# Patient Record
Sex: FEMALE | Race: WHITE | NOT HISPANIC OR LATINO | URBAN - METROPOLITAN AREA
[De-identification: names, ages, dates, MRNs, and addresses within clinical notes are randomized per-mention and may not be internally consistent; named-entity substitution may affect disease eponyms.]

---

## 2018-06-25 ENCOUNTER — INPATIENT (INPATIENT)
Facility: HOSPITAL | Age: 73
LOS: 7 days | Discharge: HOME HEALTH | DRG: 299 | End: 2018-07-03
Attending: INTERNAL MEDICINE | Admitting: HOSPITALIST
Payer: MEDICARE

## 2018-06-25 VITALS — WEIGHT: 100.09 LBS | HEIGHT: 63 IN

## 2018-06-25 LAB
ALBUMIN SERPL ELPH-MCNC: 4.2 G/DL — SIGNIFICANT CHANGE UP (ref 3.3–5.2)
ALP SERPL-CCNC: 85 U/L — SIGNIFICANT CHANGE UP (ref 40–120)
ALT FLD-CCNC: 24 U/L — SIGNIFICANT CHANGE UP
ANION GAP SERPL CALC-SCNC: 13 MMOL/L — SIGNIFICANT CHANGE UP (ref 5–17)
APTT BLD: 36.3 SEC — SIGNIFICANT CHANGE UP (ref 27.5–37.4)
AST SERPL-CCNC: 23 U/L — SIGNIFICANT CHANGE UP
BASOPHILS # BLD AUTO: 0 K/UL — SIGNIFICANT CHANGE UP (ref 0–0.2)
BASOPHILS NFR BLD AUTO: 0.5 % — SIGNIFICANT CHANGE UP (ref 0–2)
BILIRUB SERPL-MCNC: <0.2 MG/DL — LOW (ref 0.4–2)
BLD GP AB SCN SERPL QL: SIGNIFICANT CHANGE UP
BUN SERPL-MCNC: 18 MG/DL — SIGNIFICANT CHANGE UP (ref 8–20)
CALCIUM SERPL-MCNC: 9.7 MG/DL — SIGNIFICANT CHANGE UP (ref 8.6–10.2)
CHLORIDE SERPL-SCNC: 100 MMOL/L — SIGNIFICANT CHANGE UP (ref 98–107)
CO2 SERPL-SCNC: 26 MMOL/L — SIGNIFICANT CHANGE UP (ref 22–29)
CREAT SERPL-MCNC: 0.64 MG/DL — SIGNIFICANT CHANGE UP (ref 0.5–1.3)
EOSINOPHIL # BLD AUTO: 0.3 K/UL — SIGNIFICANT CHANGE UP (ref 0–0.5)
EOSINOPHIL NFR BLD AUTO: 3.5 % — SIGNIFICANT CHANGE UP (ref 0–6)
GLUCOSE SERPL-MCNC: 110 MG/DL — SIGNIFICANT CHANGE UP (ref 70–115)
HCT VFR BLD CALC: 38.3 % — SIGNIFICANT CHANGE UP (ref 37–47)
HGB BLD-MCNC: 12.5 G/DL — SIGNIFICANT CHANGE UP (ref 12–16)
INR BLD: 0.95 RATIO — SIGNIFICANT CHANGE UP (ref 0.88–1.16)
LACTATE BLDV-MCNC: 0.8 MMOL/L — SIGNIFICANT CHANGE UP (ref 0.5–2)
LYMPHOCYTES # BLD AUTO: 2.3 K/UL — SIGNIFICANT CHANGE UP (ref 1–4.8)
LYMPHOCYTES # BLD AUTO: 24.5 % — SIGNIFICANT CHANGE UP (ref 20–55)
MCHC RBC-ENTMCNC: 29.8 PG — SIGNIFICANT CHANGE UP (ref 27–31)
MCHC RBC-ENTMCNC: 32.6 G/DL — SIGNIFICANT CHANGE UP (ref 32–36)
MCV RBC AUTO: 91.2 FL — SIGNIFICANT CHANGE UP (ref 81–99)
MONOCYTES # BLD AUTO: 0.9 K/UL — HIGH (ref 0–0.8)
MONOCYTES NFR BLD AUTO: 9.2 % — SIGNIFICANT CHANGE UP (ref 3–10)
NEUTROPHILS # BLD AUTO: 5.8 K/UL — SIGNIFICANT CHANGE UP (ref 1.8–8)
NEUTROPHILS NFR BLD AUTO: 62.2 % — SIGNIFICANT CHANGE UP (ref 37–73)
NT-PROBNP SERPL-SCNC: 305 PG/ML — HIGH (ref 0–300)
PLATELET # BLD AUTO: 372 K/UL — SIGNIFICANT CHANGE UP (ref 150–400)
POTASSIUM SERPL-MCNC: 4.2 MMOL/L — SIGNIFICANT CHANGE UP (ref 3.5–5.3)
POTASSIUM SERPL-SCNC: 4.2 MMOL/L — SIGNIFICANT CHANGE UP (ref 3.5–5.3)
PROT SERPL-MCNC: 7.6 G/DL — SIGNIFICANT CHANGE UP (ref 6.6–8.7)
PROTHROM AB SERPL-ACNC: 10.4 SEC — SIGNIFICANT CHANGE UP (ref 9.8–12.7)
RBC # BLD: 4.2 M/UL — LOW (ref 4.4–5.2)
RBC # FLD: 15.6 % — SIGNIFICANT CHANGE UP (ref 11–15.6)
SODIUM SERPL-SCNC: 139 MMOL/L — SIGNIFICANT CHANGE UP (ref 135–145)
TYPE + AB SCN PNL BLD: SIGNIFICANT CHANGE UP
WBC # BLD: 9.3 K/UL — SIGNIFICANT CHANGE UP (ref 4.8–10.8)
WBC # FLD AUTO: 9.3 K/UL — SIGNIFICANT CHANGE UP (ref 4.8–10.8)

## 2018-06-25 PROCEDURE — 99285 EMERGENCY DEPT VISIT HI MDM: CPT

## 2018-06-25 PROCEDURE — 93010 ELECTROCARDIOGRAM REPORT: CPT

## 2018-06-25 NOTE — ED ADULT NURSE NOTE - OBJECTIVE STATEMENT
Pt received in [facility] with multiple medical complaints. Pt presents with gang green to R 1st digit that her PMD states is "dry gang green" and that it will just fall off on it's own. Pt also noted to have purulent draining from gang green site. Pt also mentions having 1 week of painless "vaginal spotting" as per daughter. Pt had UTI 1 week ago and has finished her round of abx.

## 2018-06-25 NOTE — ED PROVIDER NOTE - OBJECTIVE STATEMENT
71 y/o hx of a fib, COPD, glaucoma, dry gangrene and blood clots, peripheral vascular disease presents to ED c/o necrotic toe falling off. Per daughter brought pt to ED who noticed her leg and shin bone feel hot. Per daughter vaginal bleeding. pt just came back from [place] on home hospice per daughter. Pt recently had a UTI and took antibiotics for it. Per daughter pt heavily drinks. Former smoker. Stopped tobacco use about a month ago. Dr Salamanca evaluated pt and said to go to hospital to get checked out. Pt is not on any blood thinners at this time. Pt takes aspirin and antibiotics. No further complaints at this time. 73 y/o hx of Afib, COPD, glaucoma, dry gangrene and blood clots, peripheral vascular disease presents to ED c/o 1st digit on R foot pain and falling off that began in March. Per daughter who brought pt to ED who noticed her leg and shin bone feel hot. Pt was evaluated by a podiatrist and vascular surgeon in [place] but doesn't have any doctors in [place]. Per daughter pt also has vaginal bleeding. Pt just came back from [place] on home hospice. Pt recently had a UTI and took antibiotics for it. Per daughter pt drinks a lot of fluids and urinates frequently. Former smoker. Stopped tobacco use about a month ago. She saw her PMD who told her to go to hospital to get checked out. Pt takes aspirin and antibiotics. Allergy to PCN. No further complaints at this time.  PMD: Chay

## 2018-06-25 NOTE — ED STATDOCS - PROGRESS NOTE DETAILS
73 yo F PT, PmHX: COPD. presents to ED complaining of multiple complaints. PT states she is having SOB, difficulty breathing and a wet cough x 2 weeks. PT also states she has vaginal bleeding for "couple weeks." PT also admits to RLE 1st digit is "falling off." Case discussed with Dr. Sahara MD advised PT to transfer pt to main for higher acuity of care.

## 2018-06-26 ENCOUNTER — RESULT REVIEW (OUTPATIENT)
Age: 73
End: 2018-06-26

## 2018-06-26 DIAGNOSIS — I96 GANGRENE, NOT ELSEWHERE CLASSIFIED: ICD-10-CM

## 2018-06-26 LAB
ANION GAP SERPL CALC-SCNC: 13 MMOL/L — SIGNIFICANT CHANGE UP (ref 5–17)
BASOPHILS # BLD AUTO: 0 K/UL — SIGNIFICANT CHANGE UP (ref 0–0.2)
BASOPHILS NFR BLD AUTO: 0.6 % — SIGNIFICANT CHANGE UP (ref 0–2)
BUN SERPL-MCNC: 15 MG/DL — SIGNIFICANT CHANGE UP (ref 8–20)
CALCIUM SERPL-MCNC: 9.5 MG/DL — SIGNIFICANT CHANGE UP (ref 8.6–10.2)
CHLORIDE SERPL-SCNC: 101 MMOL/L — SIGNIFICANT CHANGE UP (ref 98–107)
CO2 SERPL-SCNC: 26 MMOL/L — SIGNIFICANT CHANGE UP (ref 22–29)
CREAT SERPL-MCNC: 0.55 MG/DL — SIGNIFICANT CHANGE UP (ref 0.5–1.3)
EOSINOPHIL # BLD AUTO: 0.5 K/UL — SIGNIFICANT CHANGE UP (ref 0–0.5)
EOSINOPHIL NFR BLD AUTO: 6 % — SIGNIFICANT CHANGE UP (ref 0–6)
GLUCOSE SERPL-MCNC: 89 MG/DL — SIGNIFICANT CHANGE UP (ref 70–115)
HCT VFR BLD CALC: 38.7 % — SIGNIFICANT CHANGE UP (ref 37–47)
HGB BLD-MCNC: 12.8 G/DL — SIGNIFICANT CHANGE UP (ref 12–16)
LYMPHOCYTES # BLD AUTO: 2.5 K/UL — SIGNIFICANT CHANGE UP (ref 1–4.8)
LYMPHOCYTES # BLD AUTO: 29.9 % — SIGNIFICANT CHANGE UP (ref 20–55)
MAGNESIUM SERPL-MCNC: 2.2 MG/DL — SIGNIFICANT CHANGE UP (ref 1.6–2.6)
MCHC RBC-ENTMCNC: 29.8 PG — SIGNIFICANT CHANGE UP (ref 27–31)
MCHC RBC-ENTMCNC: 33.1 G/DL — SIGNIFICANT CHANGE UP (ref 32–36)
MCV RBC AUTO: 90.2 FL — SIGNIFICANT CHANGE UP (ref 81–99)
MONOCYTES # BLD AUTO: 0.8 K/UL — SIGNIFICANT CHANGE UP (ref 0–0.8)
MONOCYTES NFR BLD AUTO: 9.7 % — SIGNIFICANT CHANGE UP (ref 3–10)
NEUTROPHILS # BLD AUTO: 4.5 K/UL — SIGNIFICANT CHANGE UP (ref 1.8–8)
NEUTROPHILS NFR BLD AUTO: 53.6 % — SIGNIFICANT CHANGE UP (ref 37–73)
PHOSPHATE SERPL-MCNC: 3.9 MG/DL — SIGNIFICANT CHANGE UP (ref 2.4–4.7)
PLATELET # BLD AUTO: 334 K/UL — SIGNIFICANT CHANGE UP (ref 150–400)
POTASSIUM SERPL-MCNC: 4.4 MMOL/L — SIGNIFICANT CHANGE UP (ref 3.5–5.3)
POTASSIUM SERPL-SCNC: 4.4 MMOL/L — SIGNIFICANT CHANGE UP (ref 3.5–5.3)
RBC # BLD: 4.29 M/UL — LOW (ref 4.4–5.2)
RBC # FLD: 15.4 % — SIGNIFICANT CHANGE UP (ref 11–15.6)
SODIUM SERPL-SCNC: 140 MMOL/L — SIGNIFICANT CHANGE UP (ref 135–145)
WBC # BLD: 8.4 K/UL — SIGNIFICANT CHANGE UP (ref 4.8–10.8)
WBC # FLD AUTO: 8.4 K/UL — SIGNIFICANT CHANGE UP (ref 4.8–10.8)

## 2018-06-26 PROCEDURE — 99223 1ST HOSP IP/OBS HIGH 75: CPT | Mod: GC

## 2018-06-26 PROCEDURE — 73620 X-RAY EXAM OF FOOT: CPT | Mod: 26,RT

## 2018-06-26 PROCEDURE — 88300 SURGICAL PATH GROSS: CPT | Mod: 26

## 2018-06-26 PROCEDURE — 71045 X-RAY EXAM CHEST 1 VIEW: CPT | Mod: 26

## 2018-06-26 PROCEDURE — 12345: CPT | Mod: NC,GC

## 2018-06-26 PROCEDURE — 88311 DECALCIFY TISSUE: CPT | Mod: 26,GV

## 2018-06-26 RX ORDER — OXYCODONE AND ACETAMINOPHEN 5; 325 MG/1; MG/1
1 TABLET ORAL EVERY 6 HOURS
Qty: 0 | Refills: 0 | Status: DISCONTINUED | OUTPATIENT
Start: 2018-06-26 | End: 2018-07-03

## 2018-06-26 RX ORDER — DILTIAZEM HCL 120 MG
120 CAPSULE, EXT RELEASE 24 HR ORAL DAILY
Qty: 0 | Refills: 0 | Status: DISCONTINUED | OUTPATIENT
Start: 2018-06-27 | End: 2018-07-03

## 2018-06-26 RX ORDER — VANCOMYCIN HCL 1 G
750 VIAL (EA) INTRAVENOUS EVERY 12 HOURS
Qty: 0 | Refills: 0 | Status: DISCONTINUED | OUTPATIENT
Start: 2018-06-26 | End: 2018-06-28

## 2018-06-26 RX ORDER — TIMOLOL 0.5 %
1 DROPS OPHTHALMIC (EYE) DAILY
Qty: 0 | Refills: 0 | Status: DISCONTINUED | OUTPATIENT
Start: 2018-06-26 | End: 2018-07-03

## 2018-06-26 RX ORDER — ASPIRIN/CALCIUM CARB/MAGNESIUM 324 MG
325 TABLET ORAL DAILY
Qty: 0 | Refills: 0 | Status: DISCONTINUED | OUTPATIENT
Start: 2018-06-26 | End: 2018-06-29

## 2018-06-26 RX ORDER — TRAZODONE HCL 50 MG
50 TABLET ORAL AT BEDTIME
Qty: 0 | Refills: 0 | Status: DISCONTINUED | OUTPATIENT
Start: 2018-06-26 | End: 2018-07-03

## 2018-06-26 RX ORDER — OXYCODONE AND ACETAMINOPHEN 5; 325 MG/1; MG/1
2 TABLET ORAL EVERY 6 HOURS
Qty: 0 | Refills: 0 | Status: DISCONTINUED | OUTPATIENT
Start: 2018-06-26 | End: 2018-07-03

## 2018-06-26 RX ORDER — ACETAMINOPHEN 500 MG
650 TABLET ORAL EVERY 6 HOURS
Qty: 0 | Refills: 0 | Status: DISCONTINUED | OUTPATIENT
Start: 2018-06-26 | End: 2018-07-03

## 2018-06-26 RX ORDER — ENOXAPARIN SODIUM 100 MG/ML
40 INJECTION SUBCUTANEOUS DAILY
Qty: 0 | Refills: 0 | Status: DISCONTINUED | OUTPATIENT
Start: 2018-06-26 | End: 2018-07-03

## 2018-06-26 RX ORDER — LATANOPROST 0.05 MG/ML
1 SOLUTION/ DROPS OPHTHALMIC; TOPICAL AT BEDTIME
Qty: 0 | Refills: 0 | Status: DISCONTINUED | OUTPATIENT
Start: 2018-06-26 | End: 2018-07-03

## 2018-06-26 RX ORDER — BRIMONIDINE TARTRATE 2 MG/MG
1 SOLUTION/ DROPS OPHTHALMIC DAILY
Qty: 0 | Refills: 0 | Status: DISCONTINUED | OUTPATIENT
Start: 2018-06-26 | End: 2018-07-03

## 2018-06-26 RX ADMIN — OXYCODONE AND ACETAMINOPHEN 1 TABLET(S): 5; 325 TABLET ORAL at 20:18

## 2018-06-26 RX ADMIN — ENOXAPARIN SODIUM 40 MILLIGRAM(S): 100 INJECTION SUBCUTANEOUS at 12:04

## 2018-06-26 RX ADMIN — Medication 1 DROP(S): at 12:04

## 2018-06-26 RX ADMIN — Medication 50 MILLIGRAM(S): at 21:47

## 2018-06-26 RX ADMIN — OXYCODONE AND ACETAMINOPHEN 1 TABLET(S): 5; 325 TABLET ORAL at 21:19

## 2018-06-26 RX ADMIN — LATANOPROST 1 DROP(S): 0.05 SOLUTION/ DROPS OPHTHALMIC; TOPICAL at 21:47

## 2018-06-26 RX ADMIN — BRIMONIDINE TARTRATE 1 DROP(S): 2 SOLUTION/ DROPS OPHTHALMIC at 12:04

## 2018-06-26 RX ADMIN — Medication 325 MILLIGRAM(S): at 12:04

## 2018-06-26 NOTE — H&P ADULT - NSHPREVIEWOFSYSTEMS_GEN_ALL_CORE
Denies fevers, chills, nausea, vomiting, chest pain, sob, palpitations, abdominal pain, diarrhea, dysuria.

## 2018-06-26 NOTE — PHYSICAL THERAPY INITIAL EVALUATION ADULT - ADDITIONAL COMMENTS
Pt reports currently living with daughter in a 1 story house with no steps.   is with pt. 24/7 and willing and able to provide assist.  PTA, pt amb with assist and required assist with all ADLs and self care.

## 2018-06-26 NOTE — H&P ADULT - NSHPPHYSICALEXAM_GEN_ALL_CORE
Vital Signs Last 24 Hrs  T(C): 36.2 (26 Jun 2018 03:44), Max: 36.6 (25 Jun 2018 20:27)  T(F): 97.1 (26 Jun 2018 03:44), Max: 97.8 (25 Jun 2018 20:27)  HR: 72 (26 Jun 2018 03:44) (72 - 73)  BP: 140/97 (26 Jun 2018 03:44) (140/97 - 141/87)  RR: 19 (26 Jun 2018 03:44) (16 - 19)  SpO2: 98% (26 Jun 2018 03:44) (96% - 98%)    General: NAD, resting comfortably in bed  HEENT: NC/AT, PERRLA, EOMI, throat non-erythematous, MMM  Neck: supple, no lymphadenopathy, no JVD  Resp: CTA bilaterally, no crackles or wheezes  Cardio: S1S2+, RRR, no murmurs  Abdomen: soft, BS+ normoactive, non-distended, non-tender  Vascular: no peripheral edema, DP pulses 2+ b/l  MSK: FROM in all extremities  Neuro: AAOx3, CN 2-12 grossly intact. Sensation reduced in right foot (all toes), sensation intact in other extremities, 5/5 strength in all extremities  Skin: right foot 1st digit dry gangrene (digit is almost falling off), no surrounding skin erythema or warmth noted

## 2018-06-26 NOTE — CONSULT NOTE ADULT - SUBJECTIVE AND OBJECTIVE BOX
71 yo female with PMHx of PVD< Chronic AFib, Left DVT, COPD, Glaucoma seen at bedside for right hallux gangrene toe. Pt states she had the ulcer for "2 weeks". Pt sees a Podiatrist who has told her the right toe will fall off by itself. Pt denies pain at the site. Pt denies being diabetic. Pt denies N/V/C/F/SOB.    HPI:  71 y/o F, former smoker with hx of PVD, chronic Afib (not on AC likely due to hx of multiple falls), hx of left DVT (April 2018), COPD (no home O2), PVD, Glaucoma, presents with complaints of right foot pain. Per daughter, patient has had a gangrenous right big toe since March, and it is almost "falling off". For the the past week, daughter noticed that the right foot was a little erythematous and warm to touch. Denies fevers, chills, nausea, vomiting. Patient was previously on hospice care (not anymore) and was told by her doctor in [place] that her toe would eventually fall off on its own. Pt lives in [place] with her  is currently visiting her daughter in [place]. She ambulates with walker at baseline and needs assistance bathing/feeding herself. (26 Jun 2018 03:10)      PAST MEDICAL & SURGICAL HISTORY:  Glaucoma  PVD (peripheral vascular disease)  COPD (chronic obstructive pulmonary disease)  Chronic atrial fibrillation  No significant past surgical history      ALLERGIES: Pen-V (Anaphylaxis)      MEDS:  aspirin enteric coated 325 milliGRAM(s) Oral daily  brimonidine 0.2% Ophthalmic Solution 1 Drop(s) Left EYE daily  enoxaparin Injectable 40 milliGRAM(s) SubCutaneous daily  latanoprost 0.005% Ophthalmic Solution 1 Drop(s) Left EYE at bedtime  timolol 0.5% Solution 1 Drop(s) Both EYES daily  traZODone 50 milliGRAM(s) Oral at bedtime      SOCIAL HISTORY:  Smoker:      FAMILY HISTORY:  No pertinent family history in first degree relatives      VITALS:  Vital Signs Last 24 Hrs  T(C): 36.4 (26 Jun 2018 07:52), Max: 36.6 (25 Jun 2018 20:27)  T(F): 97.5 (26 Jun 2018 07:52), Max: 97.8 (25 Jun 2018 20:27)  HR: 71 (26 Jun 2018 07:52) (71 - 76)  BP: 155/84 (26 Jun 2018 07:52) (140/97 - 155/84)  BP(mean): --  RR: 17 (26 Jun 2018 07:52) (16 - 19)  SpO2: 96% (26 Jun 2018 07:52) (96% - 100%)    LABS/DIAGNOSTIC TESTS:                        12.8   8.4   )-----------( 334      ( 26 Jun 2018 07:10 )             38.7     WBC Count: 8.4 K/uL (06-26 @ 07:10)  WBC Count: 9.3 K/uL (06-25 @ 21:24)    06-26    140  |  101  |  15.0  ----------------------------<  89  4.4   |  26.0  |  0.55    Ca    9.5      26 Jun 2018 07:10  Phos  3.9     06-26  Mg     2.2     06-26    TPro  7.6  /  Alb  4.2  /  TBili  <0.2<L>  /  DBili  x   /  AST  23  /  ALT  24  /  AlkPhos  85  06-25      LIVER FUNCTIONS - ( 25 Jun 2018 21:24 )  Alb: 4.2 g/dL / Pro: 7.6 g/dL / ALK PHOS: 85 U/L / ALT: 24 U/L / AST: 23 U/L / GGT: x           PT/INR - ( 25 Jun 2018 21:24 )   PT: 10.4 sec;   INR: 0.95 ratio         PTT - ( 25 Jun 2018 21:24 )  PTT:36.3 sec    ABG -     CULTURES: Pending      RADIOLOGY:    Bone erosion and osteolysis in first phalanx, head of first metatarsal   and first PIP joint. Hallux valgus. Osteopenia.. Soft tissues are normal.    Impression:  Osteomyelitis of great toe.      PE: Right Foot  Vascular: DP/PT: non palpable; CFT< 3 sec x 4; TG: slight warmth ; mild edema noted  Derm: Gangrene Right Hallux is deattached from the foot; bone exposed; no purulence noted; mild erythema noted; mild mal odor noted;  tendon exposed   Neuro: Protective sensation decreased     A: Right Hallux Gangrene with Osteomyelitis    P  Patient evaluated and chart reviewed  Xray ordered results noted above  Patient's toe was completely de-attached from the right foot. Send to Pathology; Results Pending   Cx obtained results pending  Betadine/DSD applied to the right foot   Keep dressing clean dry and intact to the right foot   Pt needs to be heel weight bearing to the right foot   Recommend ID consult  F/up on Vascular Consult Recommendation: No acute vascular surgical intervention warranted at this time; patient with adequate perfusion to the distal aspects of the extremities  IV abx as per ID recommendation   Recommend MRI of the right foot   Podiatry will follow pt while in house

## 2018-06-26 NOTE — H&P ADULT - PMH
Chronic atrial fibrillation    COPD (chronic obstructive pulmonary disease)    Glaucoma    PVD (peripheral vascular disease)

## 2018-06-26 NOTE — H&P ADULT - NSHPSOCIALHISTORY_GEN_ALL_CORE
Patient lives in [place] with her  is currently visiting her daughter in [place]. Former smoker, 2 ppd x 40 yrs, quit 6 months ago.   Occassional alcohol use.   Denies recreational drug use.   Patient lives in [place] with her  is currently visiting her daughter in [place].

## 2018-06-26 NOTE — H&P ADULT - HISTORY OF PRESENT ILLNESS
73 y/o female with hx of PVD, Afib (not on AC likely due to hx of multiple falls), hx of left DVT (April 2018), COPD (no home O2), PVD, Glaucoma, presents with complaints of right foot pain. Per daughter, patient has had a gangrenous right big toe since March, and it is almost "falling off". For the the past week, daughter noticed that the right foot was a little erythematous and warm to touch. Denies fevers, chills, nausea, vomiting. Patient was previously on hospice care (not anymore) and was told by her doctor in [place] that her toe would eventually fall off on its own. Pt lives in [place] with her  is currently visiting her daughter in [place]. She ambulates with walker at baseline and needs assistance bathing/feeding herself. 71 y/o F, former smoker with hx of PVD, chronic Afib (not on AC likely due to hx of multiple falls), hx of left DVT (April 2018), COPD (no home O2), PVD, Glaucoma, presents with complaints of right foot pain. Per daughter, patient has had a gangrenous right big toe since March, and it is almost "falling off". For the the past week, daughter noticed that the right foot was a little erythematous and warm to touch. Denies fevers, chills, nausea, vomiting. Patient was previously on hospice care (not anymore) and was told by her doctor in [place] that her toe would eventually fall off on its own. Pt lives in [place] with her  is currently visiting her daughter in [place]. She ambulates with walker at baseline and needs assistance bathing/feeding herself.

## 2018-06-26 NOTE — H&P ADULT - ASSESSMENT
71 y/o F, former smoker with hx of PVD, chronic Afib (not on AC likely due to hx of multiple falls), hx of left DVT (April 2018), COPD (no home O2), PVD, Glaucoma, with right foot 1st digit dry gangrene.     Gangrene of foot  -not septic, afebrile, no leukocytosis, lactate 0.8  -no hx of diabetes  -blood cultures pending  -will hold abx as no signs of infection  -Vascular consult  -Podiatry consult    PVD  -chronic, complicated by right foot 1st digit gangrene  -Vascular consult  -f/u DAVID    Chronic Atrial fibrillation   -CHADSVASc = 5  -Currently not on AC, likely due to hx of multiple falls (will verify with pmd in the morning)  -Rate control with Diltiazem ER 120mg qd  -Goal HR < 110  -Resume ASA 81mg    Recent hx of DVT (April 2018)  -Currently not on AC, likely due to hx of multiple falls  -will verify with PMD in the AM    COPD  -stable, no acute exacerbation    Glaucoma  -stable  -resume home meds 71 y/o F, former smoker with hx of PVD, chronic Afib (not on AC likely due to hx of multiple falls), hx of left DVT (April 2018), COPD (no home O2), PVD, Glaucoma, with right foot 1st digit dry gangrene.     Gangrene of foot  -not septic, afebrile, no leukocytosis, lactate 0.8  -no hx of diabetes  -blood cultures pending  -will hold abx as no signs of infection  -Vascular consult  -Podiatry consult    PVD  -chronic, complicated by right foot 1st digit gangrene  -Vascular consult  -f/u DAVID  -PT consult    Chronic Atrial fibrillation   -CHADSVASc = 5  -Currently not on AC, likely due to hx of multiple falls (will verify with pmd in the morning)  -Rate control with Diltiazem ER 120mg qd  -Goal HR < 110  -Resume ASA 81mg    Recent hx of DVT (April 2018)  -Currently not on AC, likely due to hx of multiple falls  -will verify with PMD in the AM    COPD  -stable, no acute exacerbation    Glaucoma  -stable  -resume home meds 73 y/o F, former smoker with hx of PVD, chronic Afib (not on AC likely due to hx of multiple falls), hx of left DVT (April 2018), COPD (no home O2), PVD, Glaucoma, with right foot 1st digit dry gangrene.     Gangrene of foot  -not septic, afebrile, no leukocytosis, lactate 0.8  -no hx of diabetes  -blood cultures pending  -will hold abx as no signs of infection, will continue to observe for signs of infection and f/u CBC  -Vascular consult  -Podiatry consult    PVD  -chronic, complicated by right foot 1st digit gangrene  -Vascular consult -f/u DAVID  -PT consult    Chronic Atrial fibrillation   -CHADSVASc = 5  -Currently not on AC, likely due to hx of multiple falls (will verify with pmd in the morning)  -Rate control with Diltiazem ER 120mg qd  -Goal HR < 110  -Resume ASA 81mg    Recent hx of DVT (April 2018)  -Currently not on AC, likely due to hx of multiple falls  -will verify with PMD in the AM  -also repeat imaging to confirm DVT    COPD  -stable, no acute exacerbation    Glaucoma  -stable  -resume home meds

## 2018-06-26 NOTE — CONSULT NOTE ADULT - SUBJECTIVE AND OBJECTIVE BOX
Vascular Attending:        HPI:  71 y/o F, former smoker with hx of PVD, chronic Afib (not on AC likely due to hx of multiple falls), hx of left DVT (April 2018), COPD (no home O2), PVD, Glaucoma, presents with complaints of right foot pain. Per daughter, patient has had a gangrenous right big toe since March, and it is almost "falling off". For the the past week, daughter noticed that the right foot was a little erythematous and warm to touch. Denies fevers, chills, nausea, vomiting. Patient was previously on hospice care (not anymore) and was told by her doctor in [place] that her toe would eventually fall off on its own. Pt lives in [place] with her  is currently visiting her daughter in [place]. She ambulates with walker at baseline and needs assistance bathing/feeding herself. (26 Jun 2018 03:10)    Vascular Surgery HPI:    Patient admission HPi reviewed.  72 year old female , minimal ambulator, hist of PVD, afib, Dvt, Copd, glaucoma, presenting with right great toe pain and gangrene.  Onset reported to be march 2018.  History of tobacco use.  Reportedly seen by vascular Md when she was in [place] and was given antibiotics and  told to allow the toe to autoamputate.  Patient with no complaints of chest pain, N/V/D or chill/fevers at this time.    PAST MEDICAL & SURGICAL HISTORY:  Glaucoma  PVD (peripheral vascular disease)  COPD (chronic obstructive pulmonary disease)  Chronic atrial fibrillation  No significant past surgical history      REVIEW OF SYSTEMS  see HPi          MEDICATIONS  (STANDING):  aspirin enteric coated 325 milliGRAM(s) Oral daily  brimonidine 0.2% Ophthalmic Solution 1 Drop(s) Left EYE daily  enoxaparin Injectable 40 milliGRAM(s) SubCutaneous daily  latanoprost 0.005% Ophthalmic Solution 1 Drop(s) Left EYE at bedtime  timolol 0.5% Solution 1 Drop(s) Both EYES daily  traZODone 50 milliGRAM(s) Oral at bedtime    MEDICATIONS  (PRN):      Allergies    Pen-V (Anaphylaxis)    Intolerances        SOCIAL HISTORY: tobacco       Vital Signs Last 24 Hrs  T(C): 36.5 (26 Jun 2018 05:17), Max: 36.6 (25 Jun 2018 20:27)  T(F): 97.7 (26 Jun 2018 05:17), Max: 97.8 (25 Jun 2018 20:27)  HR: 76 (26 Jun 2018 05:17) (72 - 76)  BP: 142/81 (26 Jun 2018 05:17) (140/97 - 142/81)  BP(mean): --  RR: 18 (26 Jun 2018 05:17) (16 - 19)  SpO2: 100% (26 Jun 2018 05:17) (96% - 100%)    PHYSICAL EXAM:      Constitutional:  no distress    Eyes: hazy, cloudy corneas     ENMT:  moist, atraumatic     Neck:  no bruits     Respiratory:  clear    Cardiovascular:  s1/s2, irregular rate and rhythm     Gastrointestinal:  soft ,no pulsation         Extremities:  warm, demarcating gangrene to the mid portion of the right great toe.  Malodorous, with serous drainage.  no gross surrounding erythema     Vascular: palpable right Pedals, Non palpable left pedals     Neurological:  gross distal motor and sensory functions intact       Psychiatric:  good affect       Pulses:   Right:                                                                          Left:  FEM [x ]2+ [ ]1+ [ ]doppler                                             FEM [ x]2+ [ ]1+ [ ]doppler    POP [x ]2+ [ ]1+ [ ]doppler                                             POP [ ]2+ [x ]1+ [ ]doppler    DP [x ]2+ [ ]1+ [ ]doppler                                                DP  Unpalpable   PT[x ]2+ [ ]1+ [ ]doppler                                                  PT unpalpable       LABS:                        12.8   8.4   )-----------( 334      ( 26 Jun 2018 07:10 )             38.7     06-26    140  |  101  |  15.0  ----------------------------<  89  4.4   |  26.0  |  0.55    Ca    9.5      26 Jun 2018 07:10  Phos  3.9     06-26  Mg     2.2     06-26    TPro  7.6  /  Alb  4.2  /  TBili  <0.2<L>  /  DBili  x   /  AST  23  /  ALT  24  /  AlkPhos  85  06-25    PT/INR - ( 25 Jun 2018 21:24 )   PT: 10.4 sec;   INR: 0.95 ratio         PTT - ( 25 Jun 2018 21:24 )  PTT:36.3 sec      RADIOLOGY & ADDITIONAL STUDIES    Impression and Plan:    Dry gangrenous changes to right toe  Toe in the process of auto amputation  Patient likely with microarterial calcifications from long term tobacco use, irreversible ischemic changes     - No acute vascular surgical intervention warranted at this time  - patient with adequate perfusion to the distal aspects of the extremities  - DAVID/PVR for baseline documentation  - Podiatry consultation for wound care recommendations and or possible surgical completion amputation  - Patient is cleared from Vascular stand point to proceed with podiatry deem surgical amputation necessary

## 2018-06-27 LAB
ANION GAP SERPL CALC-SCNC: 15 MMOL/L — SIGNIFICANT CHANGE UP (ref 5–17)
BASOPHILS # BLD AUTO: 0 K/UL — SIGNIFICANT CHANGE UP (ref 0–0.2)
BASOPHILS NFR BLD AUTO: 0.5 % — SIGNIFICANT CHANGE UP (ref 0–2)
BUN SERPL-MCNC: 17 MG/DL — SIGNIFICANT CHANGE UP (ref 8–20)
CALCIUM SERPL-MCNC: 9.6 MG/DL — SIGNIFICANT CHANGE UP (ref 8.6–10.2)
CHLORIDE SERPL-SCNC: 100 MMOL/L — SIGNIFICANT CHANGE UP (ref 98–107)
CO2 SERPL-SCNC: 23 MMOL/L — SIGNIFICANT CHANGE UP (ref 22–29)
CREAT SERPL-MCNC: 0.63 MG/DL — SIGNIFICANT CHANGE UP (ref 0.5–1.3)
EOSINOPHIL # BLD AUTO: 0.5 K/UL — SIGNIFICANT CHANGE UP (ref 0–0.5)
EOSINOPHIL NFR BLD AUTO: 4.8 % — SIGNIFICANT CHANGE UP (ref 0–6)
GLUCOSE SERPL-MCNC: 111 MG/DL — SIGNIFICANT CHANGE UP (ref 70–115)
HCT VFR BLD CALC: 39.9 % — SIGNIFICANT CHANGE UP (ref 37–47)
HGB BLD-MCNC: 13.3 G/DL — SIGNIFICANT CHANGE UP (ref 12–16)
LYMPHOCYTES # BLD AUTO: 2.5 K/UL — SIGNIFICANT CHANGE UP (ref 1–4.8)
LYMPHOCYTES # BLD AUTO: 24.2 % — SIGNIFICANT CHANGE UP (ref 20–55)
MCHC RBC-ENTMCNC: 30.1 PG — SIGNIFICANT CHANGE UP (ref 27–31)
MCHC RBC-ENTMCNC: 33.3 G/DL — SIGNIFICANT CHANGE UP (ref 32–36)
MCV RBC AUTO: 90.3 FL — SIGNIFICANT CHANGE UP (ref 81–99)
MONOCYTES # BLD AUTO: 0.7 K/UL — SIGNIFICANT CHANGE UP (ref 0–0.8)
MONOCYTES NFR BLD AUTO: 6.7 % — SIGNIFICANT CHANGE UP (ref 3–10)
NEUTROPHILS # BLD AUTO: 6.5 K/UL — SIGNIFICANT CHANGE UP (ref 1.8–8)
NEUTROPHILS NFR BLD AUTO: 63.6 % — SIGNIFICANT CHANGE UP (ref 37–73)
PLATELET # BLD AUTO: 398 K/UL — SIGNIFICANT CHANGE UP (ref 150–400)
POTASSIUM SERPL-MCNC: 3.7 MMOL/L — SIGNIFICANT CHANGE UP (ref 3.5–5.3)
POTASSIUM SERPL-SCNC: 3.7 MMOL/L — SIGNIFICANT CHANGE UP (ref 3.5–5.3)
RBC # BLD: 4.42 M/UL — SIGNIFICANT CHANGE UP (ref 4.4–5.2)
RBC # FLD: 15.2 % — SIGNIFICANT CHANGE UP (ref 11–15.6)
SODIUM SERPL-SCNC: 138 MMOL/L — SIGNIFICANT CHANGE UP (ref 135–145)
WBC # BLD: 10.3 K/UL — SIGNIFICANT CHANGE UP (ref 4.8–10.8)
WBC # FLD AUTO: 10.3 K/UL — SIGNIFICANT CHANGE UP (ref 4.8–10.8)

## 2018-06-27 PROCEDURE — 93970 EXTREMITY STUDY: CPT | Mod: 26

## 2018-06-27 PROCEDURE — 99233 SBSQ HOSP IP/OBS HIGH 50: CPT | Mod: GV,GC

## 2018-06-27 PROCEDURE — 73718 MRI LOWER EXTREMITY W/O DYE: CPT | Mod: 26,RT

## 2018-06-27 RX ORDER — CEFTRIAXONE 500 MG/1
INJECTION, POWDER, FOR SOLUTION INTRAMUSCULAR; INTRAVENOUS
Qty: 0 | Refills: 0 | Status: DISCONTINUED | OUTPATIENT
Start: 2018-06-27 | End: 2018-06-27

## 2018-06-27 RX ORDER — SACCHAROMYCES BOULARDII 250 MG
250 POWDER IN PACKET (EA) ORAL
Qty: 0 | Refills: 0 | Status: DISCONTINUED | OUTPATIENT
Start: 2018-06-27 | End: 2018-07-03

## 2018-06-27 RX ADMIN — Medication 250 MILLIGRAM(S): at 18:14

## 2018-06-27 RX ADMIN — OXYCODONE AND ACETAMINOPHEN 1 TABLET(S): 5; 325 TABLET ORAL at 21:03

## 2018-06-27 RX ADMIN — OXYCODONE AND ACETAMINOPHEN 1 TABLET(S): 5; 325 TABLET ORAL at 22:00

## 2018-06-27 RX ADMIN — Medication 250 MILLIGRAM(S): at 05:34

## 2018-06-27 RX ADMIN — LATANOPROST 1 DROP(S): 0.05 SOLUTION/ DROPS OPHTHALMIC; TOPICAL at 21:03

## 2018-06-27 RX ADMIN — ENOXAPARIN SODIUM 40 MILLIGRAM(S): 100 INJECTION SUBCUTANEOUS at 14:50

## 2018-06-27 RX ADMIN — BRIMONIDINE TARTRATE 1 DROP(S): 2 SOLUTION/ DROPS OPHTHALMIC at 14:51

## 2018-06-27 RX ADMIN — Medication 325 MILLIGRAM(S): at 14:50

## 2018-06-27 RX ADMIN — Medication 120 MILLIGRAM(S): at 05:35

## 2018-06-27 RX ADMIN — Medication 50 MILLIGRAM(S): at 21:03

## 2018-06-27 RX ADMIN — Medication 1 DROP(S): at 14:51

## 2018-06-27 NOTE — PROGRESS NOTE ADULT - SUBJECTIVE AND OBJECTIVE BOX
71 yo female with PMHx of PVD< Chronic AFib, Left DVT, COPD, Glaucoma seen at bedside for right hallux gangrene toe. Pt is NAD and AAOx 3. Pt denies N/V/C/F/SOB.    HPI:  71 y/o F, former smoker with hx of PVD, chronic Afib (not on AC likely due to hx of multiple falls), hx of left DVT (April 2018), COPD (no home O2), PVD, Glaucoma, presents with complaints of right foot pain. Per daughter, patient has had a gangrenous right big toe since March, and it is almost "falling off". For the the past week, daughter noticed that the right foot was a little erythematous and warm to touch. Denies fevers, chills, nausea, vomiting. Patient was previously on hospice care (not anymore) and was told by her doctor in [place] that her toe would eventually fall off on its own. Pt lives in [place] with her  is currently visiting her daughter in [place]. She ambulates with walker at baseline and needs assistance bathing/feeding herself. (26 Jun 2018 03:10)      PAST MEDICAL & SURGICAL HISTORY:  Glaucoma  PVD (peripheral vascular disease)  COPD (chronic obstructive pulmonary disease)  Chronic atrial fibrillation  No significant past surgical history      ALLERGIES: Pen-V (Anaphylaxis)      MEDS:  aspirin enteric coated 325 milliGRAM(s) Oral daily  brimonidine 0.2% Ophthalmic Solution 1 Drop(s) Left EYE daily  enoxaparin Injectable 40 milliGRAM(s) SubCutaneous daily  latanoprost 0.005% Ophthalmic Solution 1 Drop(s) Left EYE at bedtime  timolol 0.5% Solution 1 Drop(s) Both EYES daily  traZODone 50 milliGRAM(s) Oral at bedtime      SOCIAL HISTORY:  Smoker:      FAMILY HISTORY:  No pertinent family history in first degree relatives      VITALS:  Vital Signs Last 24 Hrs  T(C): 36.4 (26 Jun 2018 07:52), Max: 36.6 (25 Jun 2018 20:27)  T(F): 97.5 (26 Jun 2018 07:52), Max: 97.8 (25 Jun 2018 20:27)  HR: 71 (26 Jun 2018 07:52) (71 - 76)  BP: 155/84 (26 Jun 2018 07:52) (140/97 - 155/84)  BP(mean): --  RR: 17 (26 Jun 2018 07:52) (16 - 19)  SpO2: 96% (26 Jun 2018 07:52) (96% - 100%)    LABS/DIAGNOSTIC TESTS:                          13.3   10.3  )-----------( 398      ( 27 Jun 2018 08:46 )             39.9       ABG -     CULTURES: Gram Negative       RADIOLOGY:    Bone erosion and osteolysis in first phalanx, head of first metatarsal   and first PIP joint. Hallux valgus. Osteopenia.. Soft tissues are normal.    Impression:  Osteomyelitis of great toe.      PE: Right Foot  Vascular: DP/PT: non palpable; CFT< 3 sec x 4; TG: slight warmth ; mild edema noted  Derm: Gangrene Right Hallux is deattached from the foot; bone exposed; no purulence noted; mild erythema noted; mild mal odor noted;  tendon exposed   Neuro: Protective sensation decreased     A: Right Hallux Gangrene with Osteomyelitis    P  Patient evaluated and chart reviewed  Xray reviewed and noted above   Patient's toe was completely de-attached from the right foot. Send to Pathology; Results Pending   f/up on cx results   Betadine/DSD applied to the right foot   Keep dressing clean dry and intact to the right foot   Pt needs to be heel weight bearing to the right foot   F/up on ID consult  F/up on Vascular Consult Recommendation: No acute vascular surgical intervention warranted at this time; patient with adequate perfusion to the distal aspects of the extremities  IV abx as per ID recommendation   F/up on MRI of the right foot.   Podiatry will follow pt while in house

## 2018-06-27 NOTE — PROGRESS NOTE ADULT - SUBJECTIVE AND OBJECTIVE BOX
Patient is a 72y old  Female who presents with a chief complaint of right foot pain (26 Jun 2018 03:10)    Vital Signs Last 24 Hrs  T(C): 37 (27 Jun 2018 08:18), Max: 37 (27 Jun 2018 08:18)  T(F): 98.6 (27 Jun 2018 08:18), Max: 98.6 (27 Jun 2018 08:18)  HR: 76 (27 Jun 2018 08:18) (70 - 86)  BP: 130/91 (27 Jun 2018 08:18) (130/91 - 165/92)  BP(mean): --  RR: 16 (27 Jun 2018 08:18) (14 - 18)  SpO2: 96% (27 Jun 2018 08:18) (95% - 97%)    General: NAD, resting comfortably in bed  HEENT: Normocephalic/Atraumatic, PERRL, EOMI, moist mucous membranes  Neck: supple, no lymphadenopathy, no JVD  Resp: Normal respiratory rate and effort, lungs are clear to auscultation bilaterally, no crackles or wheezes  Cardio: Regular rate and rhythm, S1S2+, RRR, no murmurs  Abdomen: soft, BS+ normoactive, non-distended, non-tender  Vascular: no peripheral edema, DP pulses 2+ bilateral  MSK: FROM in all extremities, right foot covered in elastic dressing, dry, clean, intact.  Neuro: AAOx3, CN 2-12 grossly intact. Sensation reduced in right foot, sensation intact in other extremities, 5/5 strength in all extremities.                          13.3   10.3  )-----------( 398      ( 27 Jun 2018 08:46 )             39.9   06-27    138  |  100  |  17.0  ----------------------------<  111  3.7   |  23.0  |  0.63    Ca    9.6      27 Jun 2018 08:46  Phos  3.9     06-26  Mg     2.2     06-26    TPro  7.6  /  Alb  4.2  /  TBili  <0.2<L>  /  DBili  x   /  AST  23  /  ALT  24  /  AlkPhos  85  06-25    Culture - Other (06.26.18 @ 11:28)    Specimen Source: .Other right hallux ulcer    Culture Results:   Moderate Gram Negative Rods Identification and susceptibility to follow.  Culture in progress    MEDICATIONS  (STANDING):  aspirin enteric coated 325 milliGRAM(s) Oral daily  brimonidine 0.2% Ophthalmic Solution 1 Drop(s) Left EYE daily  cefTRIAXone   IVPB      diltiazem    milliGRAM(s) Oral daily  enoxaparin Injectable 40 milliGRAM(s) SubCutaneous daily  latanoprost 0.005% Ophthalmic Solution 1 Drop(s) Left EYE at bedtime  timolol 0.5% Solution 1 Drop(s) Both EYES daily  traZODone 50 milliGRAM(s) Oral at bedtime  vancomycin  IVPB 750 milliGRAM(s) IV Intermittent every 12 hours    MEDICATIONS  (PRN):  acetaminophen   Tablet. 650 milliGRAM(s) Oral every 6 hours PRN Mild Pain (1 - 3)  oxyCODONE    5 mG/acetaminophen 325 mG 1 Tablet(s) Oral every 6 hours PRN Moderate Pain (4 - 6)  oxyCODONE    5 mG/acetaminophen 325 mG 2 Tablet(s) Oral every 6 hours PRN Severe Pain (7 - 10) Patient is a 72y old  Female who presents with a chief complaint of right foot pain (26 Jun 2018 03:10)    Denies any pain, fever, nausea, vomiting, abdominal pain or other complaints at the moment.    Vital Signs Last 24 Hrs  T(C): 37 (27 Jun 2018 08:18), Max: 37 (27 Jun 2018 08:18)  T(F): 98.6 (27 Jun 2018 08:18), Max: 98.6 (27 Jun 2018 08:18)  HR: 76 (27 Jun 2018 08:18) (70 - 86)  BP: 130/91 (27 Jun 2018 08:18) (130/91 - 165/92)  BP(mean): --  RR: 16 (27 Jun 2018 08:18) (14 - 18)  SpO2: 96% (27 Jun 2018 08:18) (95% - 97%)    General: NAD, resting comfortably in bed  HEENT: Normocephalic/Atraumatic, PERRL, EOMI, moist mucous membranes  Neck: supple, no lymphadenopathy, no JVD  Resp: Normal respiratory rate and effort, lungs are clear to auscultation bilaterally, no crackles or wheezes  Cardio: Regular rate and rhythm, S1S2+, RRR, no murmurs  Abdomen: soft, BS+ normoactive, non-distended, non-tender  Vascular: no peripheral edema, DP pulses 2+ bilateral  MSK: FROM in all extremities, right foot covered in elastic dressing, dry, clean, intact. Upon uncovering, distal portion of the metatarsal bone of the right foot is exposed with surrounding fibrin, no discharge.  Neuro: AAOx3, CN 2-12 grossly intact. Sensation reduced in right foot, sensation intact in other extremities, 5/5 strength in all extremities.                          13.3   10.3  )-----------( 398      ( 27 Jun 2018 08:46 )             39.9   06-27    138  |  100  |  17.0  ----------------------------<  111  3.7   |  23.0  |  0.63    Ca    9.6      27 Jun 2018 08:46  Phos  3.9     06-26  Mg     2.2     06-26    TPro  7.6  /  Alb  4.2  /  TBili  <0.2<L>  /  DBili  x   /  AST  23  /  ALT  24  /  AlkPhos  85  06-25    Culture - Other (06.26.18 @ 11:28)    Specimen Source: .Other right hallux ulcer    Culture Results:   Moderate Gram Negative Rods Identification and susceptibility to follow.  Culture in progress    MEDICATIONS  (STANDING):  aspirin enteric coated 325 milliGRAM(s) Oral daily  brimonidine 0.2% Ophthalmic Solution 1 Drop(s) Left EYE daily  cefTRIAXone   IVPB      diltiazem    milliGRAM(s) Oral daily  enoxaparin Injectable 40 milliGRAM(s) SubCutaneous daily  latanoprost 0.005% Ophthalmic Solution 1 Drop(s) Left EYE at bedtime  timolol 0.5% Solution 1 Drop(s) Both EYES daily  traZODone 50 milliGRAM(s) Oral at bedtime  vancomycin  IVPB 750 milliGRAM(s) IV Intermittent every 12 hours    MEDICATIONS  (PRN):  acetaminophen   Tablet. 650 milliGRAM(s) Oral every 6 hours PRN Mild Pain (1 - 3)  oxyCODONE    5 mG/acetaminophen 325 mG 1 Tablet(s) Oral every 6 hours PRN Moderate Pain (4 - 6)  oxyCODONE    5 mG/acetaminophen 325 mG 2 Tablet(s) Oral every 6 hours PRN Severe Pain (7 - 10)

## 2018-06-27 NOTE — PROGRESS NOTE ADULT - ASSESSMENT
73 y/o F, former smoker with hx of PVD, chronic Afib (not on AC likely due to hx of multiple falls), hx of left DVT (April 2018), COPD (no home O2), PVD, Glaucoma, with right foot 1st digit dry gangrene.     Gangrene of foot  -Not septic, afebrile, no leukocytosis, lactate 0.8  -no hx of diabetes  -blood cultures pending, ulcer culture with gram negative rods.  -Patient on Vancomycin, will add gram negative coverage with Levofloxacin since patient is allergic to penicillin with anaphylactic reaction  -Vascular consult appreciated  -Podiatry consult appreciated, will order urgent MRI with IV contrast.    PVD  -chronic, unlikely the cause for gangrene given the presence of bilateral pedal pulses  -Vascular consult -f/u DAVID  -PT consult appreciated    Chronic Atrial fibrillation   -CHADSVASc = 5  -Currently not on AC, likely due to hx of multiple falls  -Rate control with Diltiazem ER 120mg qd  -Goal HR < 110  -Resume ASA 81mg    Recent hx of DVT (April 2018)  -Currently not on AC, likely due to hx of multiple falls  -also repeat imaging to confirm DVT    COPD  -stable, no acute exacerbation    Glaucoma  -stable  -resume home meds 71 y/o F, former smoker with hx of PVD, chronic Afib (not on AC likely due to hx of multiple falls), hx of left DVT (April 2018), COPD (no home O2), PVD, Glaucoma, with right foot 1st digit dry gangrene.     Gangrene of foot with concomitant chronic osteomyelitis  -Not septic, afebrile, no leukocytosis, lactate 0.8  -no hx of diabetes  -blood cultures pending, ulcer culture with gram negative rods.  -Patient on Vancomycin, will add gram negative coverage with Levofloxacin since patient is allergic to penicillin with anaphylactic reaction  -Vascular consult appreciated  -Podiatry consult appreciated, will order urgent MRI with IV contrast.  -ID consult    PVD  -chronic, unlikely the cause for gangrene given the presence of bilateral pedal pulses  -Vascular consult -f/u DAVID  -PT consult appreciated    Chronic Atrial fibrillation   -CHADSVASc = 5  -Currently not on AC, likely due to hx of multiple falls  -Rate control with Diltiazem ER 120mg qd  -Goal HR < 110  -Resume ASA 81mg    Recent hx of DVT (April 2018)  -Currently not on AC, likely due to hx of multiple falls  -also repeat imaging to confirm DVT    COPD  -stable, no acute exacerbation    Glaucoma  -stable  -resume home meds

## 2018-06-27 NOTE — PROGRESS NOTE ADULT - SUBJECTIVE AND OBJECTIVE BOX
Venous US reviewed with Dr Piper  < from: US Duplex Venous Lower Ext Complete, Bilateral (06.27.18 @ 09:45) >   EXAM:  US DPLX LWR EXT VEINS COMPL BI                          PROCEDURE DATE:  06/27/2018          INTERPRETATION:  CLINICAL INFORMATION: Bilateral lower extremity pain,   right foot discoloration    COMPARISON: None available.    TECHNIQUE: Duplex sonography of the BILATERAL LOWER extremities with   color and spectral Doppler, with and without compression.      FINDINGS:    There is normal compressibility of the bilateral common femoral, femoral   and popliteal veins. No calf vein thrombosisis detected.    Doppler examination shows normal spontaneous and phasic flow.    IMPRESSION:     No evidence of bilateral lower extremity deep venous thrombosis.    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 There is persistent long vascularized neck   0.4 cm in diameter.    < end of copied text >    Imp: No vascular surgical intervention is indicated. The pseudo is thrombosed  Podiatry follow up for R foot  Will sign off

## 2018-06-28 DIAGNOSIS — M86.671 OTHER CHRONIC OSTEOMYELITIS, RIGHT ANKLE AND FOOT: ICD-10-CM

## 2018-06-28 DIAGNOSIS — M86.171 OTHER ACUTE OSTEOMYELITIS, RIGHT ANKLE AND FOOT: ICD-10-CM

## 2018-06-28 DIAGNOSIS — I73.9 PERIPHERAL VASCULAR DISEASE, UNSPECIFIED: ICD-10-CM

## 2018-06-28 LAB
-  AMIKACIN: SIGNIFICANT CHANGE UP
-  AMPICILLIN/SULBACTAM: SIGNIFICANT CHANGE UP
-  AMPICILLIN: SIGNIFICANT CHANGE UP
-  AZTREONAM: SIGNIFICANT CHANGE UP
-  CEFAZOLIN: SIGNIFICANT CHANGE UP
-  CEFEPIME: SIGNIFICANT CHANGE UP
-  CEFOXITIN: SIGNIFICANT CHANGE UP
-  CEFTRIAXONE: SIGNIFICANT CHANGE UP
-  CIPROFLOXACIN: SIGNIFICANT CHANGE UP
-  ERTAPENEM: SIGNIFICANT CHANGE UP
-  GENTAMICIN: SIGNIFICANT CHANGE UP
-  IMIPENEM: SIGNIFICANT CHANGE UP
-  LEVOFLOXACIN: SIGNIFICANT CHANGE UP
-  MEROPENEM: SIGNIFICANT CHANGE UP
-  PIPERACILLIN/TAZOBACTAM: SIGNIFICANT CHANGE UP
-  TOBRAMYCIN: SIGNIFICANT CHANGE UP
-  TRIMETHOPRIM/SULFAMETHOXAZOLE: SIGNIFICANT CHANGE UP
ANION GAP SERPL CALC-SCNC: 15 MMOL/L — SIGNIFICANT CHANGE UP (ref 5–17)
BASOPHILS # BLD AUTO: 0 K/UL — SIGNIFICANT CHANGE UP (ref 0–0.2)
BASOPHILS NFR BLD AUTO: 0.5 % — SIGNIFICANT CHANGE UP (ref 0–2)
BUN SERPL-MCNC: 17 MG/DL — SIGNIFICANT CHANGE UP (ref 8–20)
CALCIUM SERPL-MCNC: 9.2 MG/DL — SIGNIFICANT CHANGE UP (ref 8.6–10.2)
CHLORIDE SERPL-SCNC: 101 MMOL/L — SIGNIFICANT CHANGE UP (ref 98–107)
CO2 SERPL-SCNC: 23 MMOL/L — SIGNIFICANT CHANGE UP (ref 22–29)
CREAT SERPL-MCNC: 0.64 MG/DL — SIGNIFICANT CHANGE UP (ref 0.5–1.3)
CRP SERPL-MCNC: 0.5 MG/DL — HIGH (ref 0–0.4)
CULTURE RESULTS: SIGNIFICANT CHANGE UP
EOSINOPHIL # BLD AUTO: 0.4 K/UL — SIGNIFICANT CHANGE UP (ref 0–0.5)
EOSINOPHIL NFR BLD AUTO: 5 % — SIGNIFICANT CHANGE UP (ref 0–6)
ERYTHROCYTE [SEDIMENTATION RATE] IN BLOOD: 27 MM/HR — HIGH (ref 0–20)
GLUCOSE SERPL-MCNC: 101 MG/DL — SIGNIFICANT CHANGE UP (ref 70–115)
HCT VFR BLD CALC: 40.8 % — SIGNIFICANT CHANGE UP (ref 37–47)
HGB BLD-MCNC: 13.5 G/DL — SIGNIFICANT CHANGE UP (ref 12–16)
LYMPHOCYTES # BLD AUTO: 2.7 K/UL — SIGNIFICANT CHANGE UP (ref 1–4.8)
LYMPHOCYTES # BLD AUTO: 35.9 % — SIGNIFICANT CHANGE UP (ref 20–55)
MCHC RBC-ENTMCNC: 29.9 PG — SIGNIFICANT CHANGE UP (ref 27–31)
MCHC RBC-ENTMCNC: 33.1 G/DL — SIGNIFICANT CHANGE UP (ref 32–36)
MCV RBC AUTO: 90.3 FL — SIGNIFICANT CHANGE UP (ref 81–99)
METHOD TYPE: SIGNIFICANT CHANGE UP
MONOCYTES # BLD AUTO: 0.6 K/UL — SIGNIFICANT CHANGE UP (ref 0–0.8)
MONOCYTES NFR BLD AUTO: 8.1 % — SIGNIFICANT CHANGE UP (ref 3–10)
NEUTROPHILS # BLD AUTO: 3.8 K/UL — SIGNIFICANT CHANGE UP (ref 1.8–8)
NEUTROPHILS NFR BLD AUTO: 50.4 % — SIGNIFICANT CHANGE UP (ref 37–73)
ORGANISM # SPEC MICROSCOPIC CNT: SIGNIFICANT CHANGE UP
ORGANISM # SPEC MICROSCOPIC CNT: SIGNIFICANT CHANGE UP
PLATELET # BLD AUTO: 337 K/UL — SIGNIFICANT CHANGE UP (ref 150–400)
POTASSIUM SERPL-MCNC: 3.8 MMOL/L — SIGNIFICANT CHANGE UP (ref 3.5–5.3)
POTASSIUM SERPL-SCNC: 3.8 MMOL/L — SIGNIFICANT CHANGE UP (ref 3.5–5.3)
RBC # BLD: 4.52 M/UL — SIGNIFICANT CHANGE UP (ref 4.4–5.2)
RBC # FLD: 15.4 % — SIGNIFICANT CHANGE UP (ref 11–15.6)
SODIUM SERPL-SCNC: 139 MMOL/L — SIGNIFICANT CHANGE UP (ref 135–145)
SPECIMEN SOURCE: SIGNIFICANT CHANGE UP
VANCOMYCIN TROUGH SERPL-MCNC: 9.4 UG/ML — LOW (ref 10–20)
WBC # BLD: 7.6 K/UL — SIGNIFICANT CHANGE UP (ref 4.8–10.8)
WBC # FLD AUTO: 7.6 K/UL — SIGNIFICANT CHANGE UP (ref 4.8–10.8)

## 2018-06-28 PROCEDURE — 99233 SBSQ HOSP IP/OBS HIGH 50: CPT | Mod: GC

## 2018-06-28 PROCEDURE — 99222 1ST HOSP IP/OBS MODERATE 55: CPT | Mod: GV

## 2018-06-28 RX ORDER — DILTIAZEM HCL 120 MG
1 CAPSULE, EXT RELEASE 24 HR ORAL
Qty: 0 | Refills: 0 | COMMUNITY

## 2018-06-28 RX ADMIN — BRIMONIDINE TARTRATE 1 DROP(S): 2 SOLUTION/ DROPS OPHTHALMIC at 16:14

## 2018-06-28 RX ADMIN — Medication 250 MILLIGRAM(S): at 05:29

## 2018-06-28 RX ADMIN — OXYCODONE AND ACETAMINOPHEN 2 TABLET(S): 5; 325 TABLET ORAL at 16:15

## 2018-06-28 RX ADMIN — ENOXAPARIN SODIUM 40 MILLIGRAM(S): 100 INJECTION SUBCUTANEOUS at 16:13

## 2018-06-28 RX ADMIN — Medication 250 MILLIGRAM(S): at 18:56

## 2018-06-28 RX ADMIN — Medication 325 MILLIGRAM(S): at 16:13

## 2018-06-28 RX ADMIN — Medication 120 MILLIGRAM(S): at 05:29

## 2018-06-28 RX ADMIN — Medication 50 MILLIGRAM(S): at 21:13

## 2018-06-28 RX ADMIN — LATANOPROST 1 DROP(S): 0.05 SOLUTION/ DROPS OPHTHALMIC; TOPICAL at 21:13

## 2018-06-28 RX ADMIN — Medication 1 DROP(S): at 16:15

## 2018-06-28 NOTE — CONSULT NOTE ADULT - PROBLEM SELECTOR RECOMMENDATION 2
- Suggest to defer surgery for now as wound healing is likely going to be poor in this patient PVD and microvascular disease.

## 2018-06-28 NOTE — CONSULT NOTE ADULT - SUBJECTIVE AND OBJECTIVE BOX
[facility]  INFECTIOUS DISEASES AND INTERNAL MEDICINE at [place]  =======================================================  Seth Mccabe MD  Diplomates American Board of Internal Medicine and Infectious Diseases  =======================================================    [facility]-[id number]  CHERELLE BEGUM   This is a 72y  Female former smoker with PVD, chronic Afib (not on AC likely due falls), prior LLE DVT (April 2018), COPD, who was admitted on 6/25/18 for right foot pain, with increased redness for 1 week.  ON a March visit, her right hallux was gangrenous and told that it would fall off on its own.     patient had been started on Vancomycin on 6/26/18; Levaquin was added on 6/27/18.  Patient was evaluated by podiatry and on exam, the hallux was found detached, with exposed bone.   The toe was removed and sent to pathology. On MRI, proximal phalanx stump and surrounding skin with possible osteomyelitis and cellulitis, respectively.   Cultures from the wound grew out alpha strep and Morganella morganii.      =======================================================  Past Medical & Surgical Hx:  =====================  PAST MEDICAL & SURGICAL HISTORY:  Glaucoma  PVD (peripheral vascular disease)  COPD (chronic obstructive pulmonary disease)  Chronic atrial fibrillation  No significant past surgical history      Problem List:  ==========  HEALTH ISSUES - PROBLEM Dx:    Social Hx:  =======  no toxic habits currently  former smoker    FAMILY HISTORY:  No pertinent family history in first degree relatives  no significant family history of immunosuppressive disorders.    Allergies  Pen-V (Anaphylaxis)  Intolerances    MEDICATIONS  (STANDING):  aspirin enteric coated 325 milliGRAM(s) Oral daily  brimonidine 0.2% Ophthalmic Solution 1 Drop(s) Left EYE daily  diltiazem    milliGRAM(s) Oral daily  enoxaparin Injectable 40 milliGRAM(s) SubCutaneous daily  latanoprost 0.005% Ophthalmic Solution 1 Drop(s) Left EYE at bedtime  levoFLOXacin IVPB 750 milliGRAM(s) IV Intermittent every 24 hours  levoFLOXacin IVPB      saccharomyces boulardii 250 milliGRAM(s) Oral two times a day  timolol 0.5% Solution 1 Drop(s) Both EYES daily  traZODone 50 milliGRAM(s) Oral at bedtime  vancomycin  IVPB 750 milliGRAM(s) IV Intermittent every 12 hours    MEDICATIONS  (PRN):  acetaminophen   Tablet. 650 milliGRAM(s) Oral every 6 hours PRN Mild Pain (1 - 3)  oxyCODONE    5 mG/acetaminophen 325 mG 1 Tablet(s) Oral every 6 hours PRN Moderate Pain (4 - 6)  oxyCODONE    5 mG/acetaminophen 325 mG 2 Tablet(s) Oral every 6 hours PRN Severe Pain (7 - 10)        =======================================================  REVIEW OF SYSTEMS:  as above  all other ROS negative    ======================================================  Physical Exam:  ============  Vital Signs Last 24 Hrs  T(C): 36.4 (28 Jun 2018 00:17), Max: 36.8 (27 Jun 2018 16:00)  T(F): 97.6 (28 Jun 2018 00:17), Max: 98.2 (27 Jun 2018 16:00)  HR: 70 (28 Jun 2018 05:20) (70 - 82)  BP: 160/90 (28 Jun 2018 05:20) (136/81 - 160/90)  RR: 19 (28 Jun 2018 00:17) (18 - 19)  SpO2: 99% (28 Jun 2018 00:17) (97% - 99%)      General:  No acute distress.  Eye: Pupils are equal, round and reactive to light, Extraocular movements are intact, Normal conjunctiva.  HENT: Normocephalic, Oral mucosa is moist, No pharyngeal erythema, No sinus tenderness.  Neck: Supple, No lymphadenopathy.  Respiratory: Lungs are clear to auscultation, Respirations are non-labored.  Cardiovascular: Normal rate, Regular rhythm, No murmur, Good pulses equal in all extremities, No edema.  Gastrointestinal: Soft, Non-tender, Non-distended, Normal bowel sounds.  Genitourinary: No costovertebral angle tenderness.  Lymphatics: No lymphadenopathy neck,   Musculoskeletal:    Integumentary: No rash.  Neurologic: Alert, Oriented, No focal deficits, Cranial Nerves II-XII are grossly intact.  Psychiatric:       =======================================================  Labs:  ====  06-28    139  |  101  |  17.0  ----------------------------<  101  3.8   |  23.0  |  0.64    Ca    9.2      28 Jun 2018 06:59                            13.5   7.6   )-----------( 337      ( 28 Jun 2018 06:59 )             40.8         RECENT CULTURES:     06-25 @ 21:36 .Blood Blood     No growth at 48 hours    Culture - Other (06.26.18 @ 11:28)    -  Amikacin: S <=16    -  Ampicillin: R >16    -  Ampicillin/Sulbactam: R >16/8    -  Aztreonam: S <=4    -  Cefazolin: R >16    -  Cefepime: S <=4    -  Cefoxitin: R >16    -  Ceftriaxone: S <=1    -  Ciprofloxacin: S <=1    -  Ertapenem: S <=1    -  Gentamicin: S <=4    -  Imipenem: S <=1    -  Levofloxacin: S <=2    -  Meropenem: S <=1    -  Piperacillin/Tazobactam: S <=16    -  Tobramycin: S <=4    -  Trimethoprim/Sulfamethoxazole: S <=2/38    Specimen Source: .Other right hallux ulcer    Culture Results:   Moderate Morganella morganii  Moderate Alpha hemolytic strep    Organism Identification: Demetria tian    Organism: Morganella morganii    Method Type: LEATHA    ====================     EXAM:  MR FOOT RT                          PROCEDURE DATE:  06/27/2018          INTERPRETATION:  EXAMINATION: MRI of the right foot without contrast    CLINICAL INFORMATION: Right hallux gangrene    TECHNIQUE: Multiplanar, multisequential MR imaging was performed.    FINDINGS: The patient is status post amputation of the hallux at the level of the head of the proximal phalanx. There is skin and subcutaneous fat thickening overlying the stump of the proximal phalanx which may be related to postsurgical change and/or cellulitis. There is high T2 and low T1 marrow signal within the distal aspect of the stump of the proximal phalanx consistent with postsurgical change and/or osteomyelitis.    There is advanced first metatarsophalangeal joint arthrosis. There is evidence of prior hallux valgus corrective surgery.    There are focal areas of nonspecific high T2 signal within the fused proximal and middle phalanges of the fifth digit and within the fifth proximal phalangeal head as well as within the fourth middle phalanx.  This may be related to ischemic change, although, osteomyelitis cannot be excluded.    There are also focal areas of nonspecific high T2 signal within the dorsal and lateral aspect of the navicular, within the lateral cuneiform, the distal cuboid, the fourth metatarsal base, and the dorsal aspect of the talar neck possibly areas of stress reaction or ischemic change, less likely to represent areas of osteomyelitis if there are no adjacent cutaneous ulcerations. Serpiginous area of partially imaged signal alteration within the calcaneus is consistent with a bone infarct.    There is chronic flattening of the heads of the second and third metatarsals. There is superimposed second and third metatarsophalangeal joint arthrosis.    There is diffuse fatty atrophy and high T2 signal of the foot musculature, suggesting subacute to chronic denervation.    IMPRESSION: Status post amputation of the hallux at the level of the head of the proximal phalanx. Adjacent signal alteration in the distal aspect of the stump of the proximal phalanx consistent with postsurgical change and/or osteomyelitis.    Scattered additional focal areas of marrow signal alteration within several bones of the hindfoot, midfoot, and forefoot with differential considerations as above.    MICHAEL DENISE M.D., ATTENDING RADIOLOGIST  This document has been electronically signed. Jun 27 2018  2:30PM [facility]  INFECTIOUS DISEASES AND INTERNAL MEDICINE at [place]  =======================================================  Seth Mccabe MD  Diplomates American Board of Internal Medicine and Infectious Diseases  =======================================================    [facility]-[id number]  CHERELLE BEGUM   This is a 72y  Female former smoker with PVD, chronic Afib (not on AC likely due falls), prior LLE DVT (April 2018), COPD, who was admitted on 6/25/18 for right foot pain, with increased redness for 1 week.  ON a March visit, her right hallux was gangrenous and told that it would fall off on its own.     patient had been started on Vancomycin on 6/26/18; Levaquin was added on 6/27/18.  Patient was evaluated by podiatry and on exam, the hallux was found detached, with exposed bone.   The toe was removed and sent to pathology. On MRI, proximal phalanx stump and surrounding skin with possible osteomyelitis and cellulitis, respectively.   Cultures from the wound grew out alpha strep and Morganella morganii.    patient denies fevers, chills at home.  her son stated that this has been happening for at least 2 months.     podiatry and vascular input appreciated.   =======================================================  Past Medical & Surgical Hx:  =====================  PAST MEDICAL & SURGICAL HISTORY:  Glaucoma  PVD (peripheral vascular disease)  COPD (chronic obstructive pulmonary disease)  Chronic atrial fibrillation  No significant past surgical history      Problem List:  ==========  HEALTH ISSUES - PROBLEM Dx:    Social Hx:  =======  no toxic habits currently  former smoker    FAMILY HISTORY:  M and Father, both smokers. Father with heart disease    Allergies  Pen-V (Anaphylaxis)  Intolerances    MEDICATIONS  (STANDING):  aspirin enteric coated 325 milliGRAM(s) Oral daily  brimonidine 0.2% Ophthalmic Solution 1 Drop(s) Left EYE daily  diltiazem    milliGRAM(s) Oral daily  enoxaparin Injectable 40 milliGRAM(s) SubCutaneous daily  latanoprost 0.005% Ophthalmic Solution 1 Drop(s) Left EYE at bedtime  levoFLOXacin IVPB 750 milliGRAM(s) IV Intermittent every 24 hours  levoFLOXacin IVPB      saccharomyces boulardii 250 milliGRAM(s) Oral two times a day  timolol 0.5% Solution 1 Drop(s) Both EYES daily  traZODone 50 milliGRAM(s) Oral at bedtime  vancomycin  IVPB 750 milliGRAM(s) IV Intermittent every 12 hours    MEDICATIONS  (PRN):  acetaminophen   Tablet. 650 milliGRAM(s) Oral every 6 hours PRN Mild Pain (1 - 3)  oxyCODONE    5 mG/acetaminophen 325 mG 1 Tablet(s) Oral every 6 hours PRN Moderate Pain (4 - 6)  oxyCODONE    5 mG/acetaminophen 325 mG 2 Tablet(s) Oral every 6 hours PRN Severe Pain (7 - 10)        =======================================================  REVIEW OF SYSTEMS:  as above  all other ROS negative    ======================================================  Physical Exam:  ============  Vital Signs Last 24 Hrs  T(C): 36.4 (28 Jun 2018 00:17), Max: 36.8 (27 Jun 2018 16:00)  T(F): 97.6 (28 Jun 2018 00:17), Max: 98.2 (27 Jun 2018 16:00)  HR: 70 (28 Jun 2018 05:20) (70 - 82)  BP: 160/90 (28 Jun 2018 05:20) (136/81 - 160/90)  RR: 19 (28 Jun 2018 00:17) (18 - 19)  SpO2: 99% (28 Jun 2018 00:17) (97% - 99%)      General:  No acute distress.  THIN  Eye: Pupils are equal, round and reactive to light, Extraocular movements are intact, Normal conjunctiva.  HENT: Normocephalic, Oral mucosa is DRY  Neck: Supple, No lymphadenopathy.  Respiratory: Lungs WITH COARSE BREATH SOUNDS  Cardiovascular: Normal rate, Regular rhythm, No murmur, Good pulses equal in all extremities, No edema.  Gastrointestinal: Soft, Non-tender, Non-distended, Normal bowel sounds.  Genitourinary: No costovertebral angle tenderness.  Lymphatics: No lymphadenopathy neck,   Musculoskeletal:  FROM, NO JOINT ABNL. warm extremities  Integumentary: RIGHT HALLUX STUMP, DRY, NO SIGNIFICANT DRAINAGE.  EXPOSED BONE.  mild erythema.   Neurologic: Alert, Oriented, No focal deficits, Cranial Nerves II-XII are grossly intact.  Psychiatric:       =======================================================  Labs:  ====  06-28    139  |  101  |  17.0  ----------------------------<  101  3.8   |  23.0  |  0.64    Ca    9.2      28 Jun 2018 06:59                            13.5   7.6   )-----------( 337      ( 28 Jun 2018 06:59 )             40.8         RECENT CULTURES:     06-25 @ 21:36 .Blood Blood     No growth at 48 hours    Culture - Other (06.26.18 @ 11:28)    -  Amikacin: S <=16    -  Ampicillin: R >16    -  Ampicillin/Sulbactam: R >16/8    -  Aztreonam: S <=4    -  Cefazolin: R >16    -  Cefepime: S <=4    -  Cefoxitin: R >16    -  Ceftriaxone: S <=1    -  Ciprofloxacin: S <=1    -  Ertapenem: S <=1    -  Gentamicin: S <=4    -  Imipenem: S <=1    -  Levofloxacin: S <=2    -  Meropenem: S <=1    -  Piperacillin/Tazobactam: S <=16    -  Tobramycin: S <=4    -  Trimethoprim/Sulfamethoxazole: S <=2/38    Specimen Source: .Other right hallux ulcer    Culture Results:   Moderate Morganella morganii  Moderate Alpha hemolytic strep    Organism Identification: Morganella morganii    Organism: Morganella morganii    Method Type: LEATHA      Sedimentation Rate, Erythrocyte (06.28.18 @ 06:59)    Sedimentation Rate, Erythrocyte: 27 mm/hr      ====================     EXAM:  MR FOOT RT                          PROCEDURE DATE:  06/27/2018          INTERPRETATION:  EXAMINATION: MRI of the right foot without contrast    CLINICAL INFORMATION: Right hallux gangrene    TECHNIQUE: Multiplanar, multisequential MR imaging was performed.    FINDINGS: The patient is status post amputation of the hallux at the level of the head of the proximal phalanx. There is skin and subcutaneous fat thickening overlying the stump of the proximal phalanx which may be related to postsurgical change and/or cellulitis. There is high T2 and low T1 marrow signal within the distal aspect of the stump of the proximal phalanx consistent with postsurgical change and/or osteomyelitis.    There is advanced first metatarsophalangeal joint arthrosis. There is evidence of prior hallux valgus corrective surgery.    There are focal areas of nonspecific high T2 signal within the fused proximal and middle phalanges of the fifth digit and within the fifth proximal phalangeal head as well as within the fourth middle phalanx.  This may be related to ischemic change, although, osteomyelitis cannot be excluded.    There are also focal areas of nonspecific high T2 signal within the dorsal and lateral aspect of the navicular, within the lateral cuneiform, the distal cuboid, the fourth metatarsal base, and the dorsal aspect of the talar neck possibly areas of stress reaction or ischemic change, less likely to represent areas of osteomyelitis if there are no adjacent cutaneous ulcerations. Serpiginous area of partially imaged signal alteration within the calcaneus is consistent with a bone infarct.    There is chronic flattening of the heads of the second and third metatarsals. There is superimposed second and third metatarsophalangeal joint arthrosis.    There is diffuse fatty atrophy and high T2 signal of the foot musculature, suggesting subacute to chronic denervation.    IMPRESSION: Status post amputation of the hallux at the level of the head of the proximal phalanx. Adjacent signal alteration in the distal aspect of the stump of the proximal phalanx consistent with postsurgical change and/or osteomyelitis.    Scattered additional focal areas of marrow signal alteration within several bones of the hindfoot, midfoot, and forefoot with differential considerations as above.    MICHAEL DENISE M.D., ATTENDING RADIOLOGIST  This document has been electronically signed. Jun 27 2018  2:30PM

## 2018-06-28 NOTE — CONSULT NOTE ADULT - ASSESSMENT
This 72 y.o. F former smoker, with PVD, prior right hallux gangrene, now s/p autoamputation.  Patient has ESR of 27.

## 2018-06-28 NOTE — CONSULT NOTE ADULT - PROBLEM SELECTOR RECOMMENDATION 9
- Clinical exam not consistent with acute infection  - NO WBC increase, NO fever noted  - suggest to stop VANCO  - Continue Levaquin x 7 days  - continue supportive care.    - Suggest to defer surgery for now as wound healing is likely going to be poor in this patient PVD and microvascular disease.

## 2018-06-28 NOTE — PROGRESS NOTE ADULT - SUBJECTIVE AND OBJECTIVE BOX
Patient is a 72y old  Female who presents with a chief complaint of right foot pain (26 Jun 2018 03:10)    Patient was updated by Podiatry team member at bedside about the intervention plan. Patient will be scheduled for amputation of right foot on 06/28/2018. Patient expressed her concerns regarding the pain management after surgery and the rehabilitation process. Questions answered and discuss with patient and  at bedside. No other complaints at the moment.    ROS: Denies any pain, fever, nausea, vomiting or abdominal pain.    Vital Signs Last 24 Hrs  T(C): 36.4 (28 Jun 2018 00:17), Max: 36.8 (27 Jun 2018 16:00)  T(F): 97.6 (28 Jun 2018 00:17), Max: 98.2 (27 Jun 2018 16:00)  HR: 70 (28 Jun 2018 05:20) (70 - 82)  BP: 160/90 (28 Jun 2018 05:20) (136/81 - 160/90)  BP(mean): --  RR: 19 (28 Jun 2018 00:17) (18 - 19)  SpO2: 99% (28 Jun 2018 00:17) (97% - 99%)    General: No acute distress, resting comfortably in bed  HEENT: Normocephalic/Atraumatic, PERRL, EOMI, moist mucous membranes  Neck: supple, no lymphadenopathy, no JVD  Resp: Normal respiratory rate and effort, lungs are clear to auscultation bilaterally, no crackles or wheezes  Cardio: Regular rate and rhythm, S1S2+, RRR, no murmurs  Abdomen: soft, BS+ normoactive, non-distended, non-tender  Vascular: no peripheral edema, DP pulses 2+ bilateral  MSK: FROM in all extremities, right foot covered in elastic dressing, dry, clean, intact. Upon uncovering, distal portion of the metatarsal bone of the right foot is exposed with surrounding fibrin, no discharge.  Neuro: AAOx3, CN 2-12 grossly intact. Sensation reduced in right foot, sensation intact in other extremities, 5/5 strength in all extremities.                          13.5   7.6   )-----------( 337      ( 28 Jun 2018 06:59 )             40.8   06-28    139  |  101  |  17.0  ----------------------------<  101  3.8   |  23.0  |  0.64    Ca    9.2      28 Jun 2018 06:59    Sedimentation Rate, Erythrocyte: 27 mm/hr (06.28.18 @ 06:59)  C-Reactive Protein, Serum: 0.50 mg/dL (06.28.18 @ 06:59)    Culture - Other (06.26.18 @ 11:28)    -  Amikacin: S <=16    -  Ampicillin: R >16    -  Ampicillin/Sulbactam: R >16/8    -  Aztreonam: S <=4    -  Cefazolin: R >16    -  Cefepime: S <=4    -  Cefoxitin: R >16    -  Ceftriaxone: S <=1    -  Ciprofloxacin: S <=1    -  Ertapenem: S <=1    -  Gentamicin: S <=4    -  Imipenem: S <=1    -  Levofloxacin: S <=2    -  Meropenem: S <=1    -  Piperacillin/Tazobactam: S <=16    -  Tobramycin: S <=4    -  Trimethoprim/Sulfamethoxazole: S <=2/38    Specimen Source: .Other right hallux ulcer    Culture Results:   Moderate Morganella morganii  Moderate Alpha hemolytic strep    Organism Identification: Morganella morganii    Organism: Morganella morganii    Method Type: LEATHA    Culture - Blood (06.25.18 @ 21:36)    Specimen Source: .Blood Blood    Culture Results:   No growth at 48 hours    Culture - Blood (06.25.18 @ 21:36)    Specimen Source: .Blood Blood    Culture Results:   No growth at 48 hours    < from: MR Foot No Cont, Right (06.27.18 @ 14:00) >  FINDINGS: The patient is status post amputation of the hallux at the level of the head of the proximal phalanx. There is skin and subcutaneous fat thickening overlying the stump of the proximal phalanx which may be related to postsurgical change and/or cellulitis. There is high T2 and low T1 marrow signal within the distal aspect of the stump of the proximal phalanx consistent with postsurgical change and/or osteomyelitis.    There is advanced first metatarsophalangeal joint arthrosis. There is evidence of prior hallux valgus corrective surgery.    There are focal areas of nonspecific high T2 signal within the fused proximal and middle phalanges of the fifth digit and within the fifth proximal phalangeal head as well as within the fourth middle phalanx.  This may be related to ischemic change, although, osteomyelitis cannot be excluded.    There are also focal areas of nonspecific high T2 signal within the dorsal and lateral aspect of the navicular, within the lateral cuneiform, the distal cuboid, the fourth metatarsal base, and the dorsal aspect of the talar neck possibly areas of stress reaction or ischemic change, less likely to represent areas of osteomyelitis if there are no adjacent cutaneous ulcerations. Serpiginous area of partially imaged signal alteration within the calcaneus is consistent with a bone infarct.    There is chronic flattening of the heads of the second and third metatarsals. There is superimposed second and third metatarsophalangeal joint arthrosis.  There is diffuse fatty atrophy and high T2 signal of the foot musculature, suggesting subacute to chronic denervation.    IMPRESSION: Status post amputation of the hallux at the level of the head of the proximal phalanx. Adjacent signal alteration in the distal aspect of the stump of the proximal phalanx consistent with postsurgical change and/or osteomyelitis.  Scattered additional focal areas of marrow signal alteration within several bones of the hindfoot, midfoot, and forefoot with differential considerations as above.  < end of copied text >    MEDICATIONS  (STANDING):  aspirin enteric coated 325 milliGRAM(s) Oral daily  brimonidine 0.2% Ophthalmic Solution 1 Drop(s) Left EYE daily  diltiazem    milliGRAM(s) Oral daily  enoxaparin Injectable 40 milliGRAM(s) SubCutaneous daily  latanoprost 0.005% Ophthalmic Solution 1 Drop(s) Left EYE at bedtime  levoFLOXacin IVPB 750 milliGRAM(s) IV Intermittent every 24 hours  levoFLOXacin IVPB      saccharomyces boulardii 250 milliGRAM(s) Oral two times a day  timolol 0.5% Solution 1 Drop(s) Both EYES daily  traZODone 50 milliGRAM(s) Oral at bedtime  vancomycin  IVPB 750 milliGRAM(s) IV Intermittent every 12 hours    MEDICATIONS  (PRN):  acetaminophen   Tablet. 650 milliGRAM(s) Oral every 6 hours PRN Mild Pain (1 - 3)  oxyCODONE    5 mG/acetaminophen 325 mG 1 Tablet(s) Oral every 6 hours PRN Moderate Pain (4 - 6)  oxyCODONE    5 mG/acetaminophen 325 mG 2 Tablet(s) Oral every 6 hours PRN Severe Pain (7 - 10)

## 2018-06-28 NOTE — PROGRESS NOTE ADULT - ASSESSMENT
71 y/o F, former smoker with hx of PVD, chronic Afib (not on AC likely due to hx of multiple falls), hx of left DVT (April 2018), COPD (no home O2), PVD, Glaucoma, with right foot 1st digit dry gangrene.     Gangrene of foot with concomitant chronic osteomyelitis  -Not septic, afebrile, no leukocytosis, lactate 0.8  -no hx of diabetes  -Negative blood cultures x2, Ulcer cultures positive for Morganella morganii sensitive to current Abx (Levofloxacin), patient allergic to Penicillin, will discontinue Vancomycin.  -MRI showing right hallux amputation, several areas in the entire foot suspicious for osteomyelitis and evidence of osteomyelitis on distal aspect of stump.  -Vascular consult appreciated  -Podiatry consult appreciated, patient to go for amputation of food on 06/29/2018  -ID consult    PVD  -chronic, unlikely the cause for gangrene given the presence of bilateral pedal pulses  -Vascular consult -f/u DAVID  -PT consult appreciated    Chronic Atrial fibrillation   -CHADSVASc = 5  -Currently not on AC, likely due to hx of multiple falls  -Rate control with Diltiazem ER 120mg qd  -Goal HR < 110  -Resume ASA 81mg    Recent hx of DVT (April 2018)  -Currently not on AC, likely due to hx of multiple falls  -also repeat imaging to confirm DVT    COPD  -stable, no acute exacerbation    Glaucoma  -stable  -resume home meds

## 2018-06-28 NOTE — PROGRESS NOTE ADULT - SUBJECTIVE AND OBJECTIVE BOX
73 yo female with PMHx of PVD< Chronic AFib, Left DVT, COPD, Glaucoma seen at bedside for right hallux gangrene toe. Pt is NAD and AAOx 3. Pt denies N/V/C/F/SOB.                          13.5   7.6   )-----------( 337      ( 28 Jun 2018 06:59 )             40.8       PAST MEDICAL & SURGICAL HISTORY:  Glaucoma  PVD (peripheral vascular disease)  COPD (chronic obstructive pulmonary disease)  Chronic atrial fibrillation  No significant past surgical history      ALLERGIES: Pen-V (Anaphylaxis)      MEDS:  aspirin enteric coated 325 milliGRAM(s) Oral daily  brimonidine 0.2% Ophthalmic Solution 1 Drop(s) Left EYE daily  enoxaparin Injectable 40 milliGRAM(s) SubCutaneous daily  latanoprost 0.005% Ophthalmic Solution 1 Drop(s) Left EYE at bedtime  timolol 0.5% Solution 1 Drop(s) Both EYES daily  traZODone 50 milliGRAM(s) Oral at bedtime      SOCIAL HISTORY:  Smoker:      FAMILY HISTORY:  No pertinent family history in first degree relatives    ABG -     CULTURES: Gram Negative       RADIOLOGY:    Bone erosion and osteolysis in first phalanx, head of first metatarsal   and first PIP joint. Hallux valgus. Osteopenia.. Soft tissues are normal.    Impression:  Osteomyelitis of great toe.    MRI: < from: MR Foot No Cont, Right (06.27.18 @ 14:00) >  FINDINGS: The patient is status post amputation of the hallux at the   level of the head of the proximal phalanx. There is skin and subcutaneous   fat thickening overlying the stump of the proximal phalanx which may be   related to postsurgical change and/or cellulitis. There is high T2 and   low T1 marrow signal within the distal aspect of the stump of the   proximal phalanx consistent with postsurgical change and/or osteomyelitis.    There is advanced first metatarsophalangeal joint arthrosis. There is   evidence of prior hallux valgus corrective surgery.    There are focal areas of nonspecific high T2 signal within the fused   proximal and middle phalanges of the fifth digit and within the fifth   proximal phalangeal head as well as within the fourth middle phalanx.    This may be related to ischemic change, although, osteomyelitis cannot be   excluded.    There are also focal areas of nonspecific high T2 signal within the   dorsal and lateral aspect of the navicular, within the lateral cuneiform,   the distal cuboid, the fourth metatarsal base, and the dorsal aspect of   the talar neck possibly areas of stress reaction or ischemic change, less   likely to represent areas of osteomyelitis if there are no adjacent   cutaneous ulcerations. Serpiginous area of partially imaged signal   alteration within the calcaneus is consistent with a bone infarct.    There is chronic flattening of the heads of the second and third   metatarsals. There is superimposed secondand third metatarsophalangeal   joint arthrosis.    There is diffuse fatty atrophy and high T2 signal of the foot   musculature, suggesting subacute to chronic denervation.    IMPRESSION: Status post amputation of the hallux at the level of the head   of the proximal phalanx. Adjacent signal alteration in the distal aspect   of the stump of the proximal phalanx consistent with postsurgical change   and/or osteomyelitis.    Scattered additional focal areas of marrow signal alteration within   several bones of the hindfoot, midfoot, and forefoot with differential   considerations as above.      PE: Right Foot  Vascular: DP/PT: non palpable; CFT< 3 sec x 4; TG: slight warmth ; mild edema noted  Derm: Gangrene Right Hallux is deattached from the foot; bone exposed; no purulence noted; mild erythema noted; mild mal odor noted;  tendon exposed   Neuro: Protective sensation decreased     A: Right Hallux Gangrene with Osteomyelitis    P  Patient evaluated and chart reviewed  Xray reviewed and noted above   MRI reviewed; results noted above   Patient's toe was completely de-attached from the right foot. Send to Pathology; Results Pending   f/up on cx results: Gram Negative Austin  Betadine/DSD applied to the right foot   Keep dressing clean dry and intact to the right foot   Pt needs to be heel weight bearing to the right foot   F/up on ID consult  F/up on Vascular Consult Recommendation: No acute vascular surgical intervention warranted at this time; patient with adequate perfusion to the distal aspects of the extremities  IV abx as per ID recommendation   Discussed treatment plan throughly for Osteomyelitis with patient and patient's daughter (Karis: [phone]). Explained both conservative treatment ( 6 weeks IV abx ) vs surgical intervention/amputation. Pt's daughter opt for surgical intervention at this time for her mother. Podiatry is planning to take pt for Right Foot Partial First Ray Amputation tomorrow 6/29/18 with Dr. Jackson. Please medically clear pt for surgery. Pt needs to be NPO after midnight tonight 6/28/18. Please order Type and Screen, INR, PT, PTT, CBC.   Podiatry will follow pt while in house 71 yo female with PMHx of PVD< Chronic AFib, Left DVT, COPD, Glaucoma seen at bedside for right hallux gangrene toe. Pt is NAD and AAOx 3. Pt denies N/V/C/F/SOB.                          13.5   7.6   )-----------( 337      ( 28 Jun 2018 06:59 )             40.8       PAST MEDICAL & SURGICAL HISTORY:  Glaucoma  PVD (peripheral vascular disease)  COPD (chronic obstructive pulmonary disease)  Chronic atrial fibrillation  No significant past surgical history      ALLERGIES: Pen-V (Anaphylaxis)      MEDS:  aspirin enteric coated 325 milliGRAM(s) Oral daily  brimonidine 0.2% Ophthalmic Solution 1 Drop(s) Left EYE daily  enoxaparin Injectable 40 milliGRAM(s) SubCutaneous daily  latanoprost 0.005% Ophthalmic Solution 1 Drop(s) Left EYE at bedtime  timolol 0.5% Solution 1 Drop(s) Both EYES daily  traZODone 50 milliGRAM(s) Oral at bedtime      SOCIAL HISTORY:  Smoker:      FAMILY HISTORY:  No pertinent family history in first degree relatives    ABG -     CULTURES: Gram Negative       RADIOLOGY:    Bone erosion and osteolysis in first phalanx, head of first metatarsal   and first PIP joint. Hallux valgus. Osteopenia.. Soft tissues are normal.    Impression:  Osteomyelitis of great toe.    MRI: < from: MR Foot No Cont, Right (06.27.18 @ 14:00) >  FINDINGS: The patient is status post amputation of the hallux at the   level of the head of the proximal phalanx. There is skin and subcutaneous   fat thickening overlying the stump of the proximal phalanx which may be   related to postsurgical change and/or cellulitis. There is high T2 and   low T1 marrow signal within the distal aspect of the stump of the   proximal phalanx consistent with postsurgical change and/or osteomyelitis.    There is advanced first metatarsophalangeal joint arthrosis. There is   evidence of prior hallux valgus corrective surgery.    There are focal areas of nonspecific high T2 signal within the fused   proximal and middle phalanges of the fifth digit and within the fifth   proximal phalangeal head as well as within the fourth middle phalanx.    This may be related to ischemic change, although, osteomyelitis cannot be   excluded.    There are also focal areas of nonspecific high T2 signal within the   dorsal and lateral aspect of the navicular, within the lateral cuneiform,   the distal cuboid, the fourth metatarsal base, and the dorsal aspect of   the talar neck possibly areas of stress reaction or ischemic change, less   likely to represent areas of osteomyelitis if there are no adjacent   cutaneous ulcerations. Serpiginous area of partially imaged signal   alteration within the calcaneus is consistent with a bone infarct.    There is chronic flattening of the heads of the second and third   metatarsals. There is superimposed secondand third metatarsophalangeal   joint arthrosis.    There is diffuse fatty atrophy and high T2 signal of the foot   musculature, suggesting subacute to chronic denervation.    IMPRESSION: Status post amputation of the hallux at the level of the head   of the proximal phalanx. Adjacent signal alteration in the distal aspect   of the stump of the proximal phalanx consistent with postsurgical change   and/or osteomyelitis.    Scattered additional focal areas of marrow signal alteration within   several bones of the hindfoot, midfoot, and forefoot with differential   considerations as above.      PE: Right Foot  Vascular: DP/PT: non palpable; CFT< 3 sec x 4; TG: slight warmth ; mild edema noted  Derm: Gangrene Right Hallux is deattached from the foot; bone exposed; no purulence noted; mild erythema noted; mild mal odor noted;  tendon exposed   Neuro: Protective sensation decreased     A: Right Hallux Gangrene with Osteomyelitis    P  Patient evaluated and chart reviewed  Xray reviewed and noted above   MRI reviewed; results noted above   Patient's toe was completely de-attached from the right foot. Send to Pathology; Results Pending   f/up on cx results: Gram Negative Austin  Betadine/DSD applied to the right foot   Keep dressing clean dry and intact to the right foot   Pt needs to be heel weight bearing to the right foot   F/up on ID consult  F/up on Vascular Consult Recommendation: No acute vascular surgical intervention warranted at this time; patient with adequate perfusion to the distal aspects of the extremities  ID: Suggest to defer surgery for now as wound healing is likely going to be poor in this patient PVD and microvascular disease. Suggest IV abx for long term to treat osteomyelitis   IV abx as per ID recommendation   Discussed treatment plan throughly for Osteomyelitis with patient and patient's daughter (Karis: [phone]). Explained both conservative treatment ( 6 weeks IV abx ) vs surgical intervention/amputation. Explain to daughter that Podiatry will be deferring the surgery as of right now due to low potential of wound healing. Pt will be treated will long term IV abx as per ID recommendation.   As per Podiatry no sx intervention planned. Continue IV abx and local wound care. Recommend DAVID.   Podiatry will follow pt while in house

## 2018-06-29 ENCOUNTER — TRANSCRIPTION ENCOUNTER (OUTPATIENT)
Age: 73
End: 2018-06-29

## 2018-06-29 PROCEDURE — 99233 SBSQ HOSP IP/OBS HIGH 50: CPT | Mod: GV,GC

## 2018-06-29 RX ORDER — CADEXOMER IODINE 0.9 %
1 PADS, MEDICATED (EA) TOPICAL DAILY
Qty: 0 | Refills: 0 | Status: DISCONTINUED | OUTPATIENT
Start: 2018-06-29 | End: 2018-07-03

## 2018-06-29 RX ORDER — ASPIRIN/CALCIUM CARB/MAGNESIUM 324 MG
81 TABLET ORAL DAILY
Qty: 0 | Refills: 0 | Status: DISCONTINUED | OUTPATIENT
Start: 2018-06-30 | End: 2018-07-03

## 2018-06-29 RX ORDER — LANOLIN ALCOHOL/MO/W.PET/CERES
3 CREAM (GRAM) TOPICAL ONCE
Qty: 0 | Refills: 0 | Status: COMPLETED | OUTPATIENT
Start: 2018-06-29 | End: 2018-06-29

## 2018-06-29 RX ADMIN — Medication 250 MILLIGRAM(S): at 18:12

## 2018-06-29 RX ADMIN — BRIMONIDINE TARTRATE 1 DROP(S): 2 SOLUTION/ DROPS OPHTHALMIC at 14:01

## 2018-06-29 RX ADMIN — OXYCODONE AND ACETAMINOPHEN 2 TABLET(S): 5; 325 TABLET ORAL at 20:50

## 2018-06-29 RX ADMIN — Medication 250 MILLIGRAM(S): at 05:55

## 2018-06-29 RX ADMIN — Medication 1 APPLICATION(S): at 18:12

## 2018-06-29 RX ADMIN — Medication 1 DROP(S): at 14:01

## 2018-06-29 RX ADMIN — Medication 325 MILLIGRAM(S): at 14:00

## 2018-06-29 RX ADMIN — ENOXAPARIN SODIUM 40 MILLIGRAM(S): 100 INJECTION SUBCUTANEOUS at 14:00

## 2018-06-29 RX ADMIN — Medication 3 MILLIGRAM(S): at 02:41

## 2018-06-29 RX ADMIN — Medication 120 MILLIGRAM(S): at 05:55

## 2018-06-29 RX ADMIN — Medication 50 MILLIGRAM(S): at 22:08

## 2018-06-29 RX ADMIN — OXYCODONE AND ACETAMINOPHEN 2 TABLET(S): 5; 325 TABLET ORAL at 19:57

## 2018-06-29 RX ADMIN — LATANOPROST 1 DROP(S): 0.05 SOLUTION/ DROPS OPHTHALMIC; TOPICAL at 22:08

## 2018-06-29 NOTE — PROGRESS NOTE ADULT - SUBJECTIVE AND OBJECTIVE BOX
73 yo female with PMHx of PVD< Chronic AFib, Left DVT, COPD, Glaucoma seen at bedside for right hallux gangrene toe. Pt is NAD and AAOx 3. Pt denies N/V/C/F/SOB.                           13.5   7.6   )-----------( 337      ( 28 Jun 2018 06:59 )             40.8         PAST MEDICAL & SURGICAL HISTORY:  Glaucoma  PVD (peripheral vascular disease)  COPD (chronic obstructive pulmonary disease)  Chronic atrial fibrillation  No significant past surgical history      ALLERGIES: Pen-V (Anaphylaxis)      MEDS:  aspirin enteric coated 325 milliGRAM(s) Oral daily  brimonidine 0.2% Ophthalmic Solution 1 Drop(s) Left EYE daily  enoxaparin Injectable 40 milliGRAM(s) SubCutaneous daily  latanoprost 0.005% Ophthalmic Solution 1 Drop(s) Left EYE at bedtime  timolol 0.5% Solution 1 Drop(s) Both EYES daily  traZODone 50 milliGRAM(s) Oral at bedtime      SOCIAL HISTORY:  Smoker:      FAMILY HISTORY:  No pertinent family history in first degree relatives    ABG -     CULTURES: Gram Negative       RADIOLOGY:    Bone erosion and osteolysis in first phalanx, head of first metatarsal   and first PIP joint. Hallux valgus. Osteopenia.. Soft tissues are normal.    Impression:  Osteomyelitis of great toe.    MRI: < from: MR Foot No Cont, Right (06.27.18 @ 14:00) >  FINDINGS: The patient is status post amputation of the hallux at the   level of the head of the proximal phalanx. There is skin and subcutaneous   fat thickening overlying the stump of the proximal phalanx which may be   related to postsurgical change and/or cellulitis. There is high T2 and   low T1 marrow signal within the distal aspect of the stump of the   proximal phalanx consistent with postsurgical change and/or osteomyelitis.    There is advanced first metatarsophalangeal joint arthrosis. There is   evidence of prior hallux valgus corrective surgery.    There are focal areas of nonspecific high T2 signal within the fused   proximal and middle phalanges of the fifth digit and within the fifth   proximal phalangeal head as well as within the fourth middle phalanx.    This may be related to ischemic change, although, osteomyelitis cannot be   excluded.    There are also focal areas of nonspecific high T2 signal within the   dorsal and lateral aspect of the navicular, within the lateral cuneiform,   the distal cuboid, the fourth metatarsal base, and the dorsal aspect of   the talar neck possibly areas of stress reaction or ischemic change, less   likely to represent areas of osteomyelitis if there are no adjacent   cutaneous ulcerations. Serpiginous area of partially imaged signal   alteration within the calcaneus is consistent with a bone infarct.    There is chronic flattening of the heads of the second and third   metatarsals. There is superimposed secondand third metatarsophalangeal   joint arthrosis.    There is diffuse fatty atrophy and high T2 signal of the foot   musculature, suggesting subacute to chronic denervation.    IMPRESSION: Status post amputation of the hallux at the level of the head   of the proximal phalanx. Adjacent signal alteration in the distal aspect   of the stump of the proximal phalanx consistent with postsurgical change   and/or osteomyelitis.    Scattered additional focal areas of marrow signal alteration within   several bones of the hindfoot, midfoot, and forefoot with differential   considerations as above.      PE: Right Foot  Vascular: DP/PT: non palpable; CFT< 3 sec x 4; TG: slight warmth ; mild edema noted  Derm: Gangrene Right Hallux is deattached from the foot; bone exposed; no purulence noted; mild erythema noted; mild mal odor noted;  tendon exposed   Neuro: Protective sensation decreased     A: Right Hallux Gangrene with Osteomyelitis    P  Patient evaluated and chart reviewed  Xray reviewed and noted above   MRI reviewed; results noted above   Patient's toe was completely de-attached from the right foot. Send to Pathology; Results Pending   f/up on cx results: Morganella Morgi, Alpha Hemolytic Strep   F/up on DAVID results   Iodosorb/DSD applied to the right foot   Keep dressing clean dry and intact to the right foot   Pt needs to be heel weight bearing to the right foot   WCO placed   F/up on Vascular Consult Recommendation: No acute vascular surgical intervention warranted at this time; patient with adequate perfusion to the distal aspects of the extremities  F/up ID Recommendation: Suggest to defer surgery for now as wound healing is likely going to be poor in this patient PVD and microvascular disease. Suggest IV abx for long term to treat osteomyelitis   IV abx as per ID recommendation   Discussed treatment plan thoroughly for Osteomyelitis with patient and patient's daughter (Karis: [phone]). Explained both conservative treatment ( 6 weeks IV abx ) vs surgical intervention/amputation. Explain to daughter that Podiatry will be deferring the surgery as of right now due to low potential of wound healing. Pt will be treated will long term IV abx as per ID recommendation.   As per Podiatry no sx intervention planned. Continue IV abx and local wound care.   Pt is podiatrically stable for discharge if medically stable. Pt will follow up with Dr. Navarrete as outpatient for further wound care.   Podiatry will follow pt while in house        Wound Care Orders  Please remove old dressing  Apply idosorb tot he right hallux  Apply guaze with ABD pad  Wrap with kerlix   Keep dressing clean dry and intact to the right foot

## 2018-06-29 NOTE — DISCHARGE NOTE ADULT - PATIENT PORTAL LINK FT
You can access the [facility] Portal, offered by [facility], by registering with the following website: [email or web address]

## 2018-06-29 NOTE — DISCHARGE NOTE ADULT - SECONDARY DIAGNOSIS.
Chronic atrial fibrillation Dementia PVD (peripheral vascular disease) COPD (chronic obstructive pulmonary disease)

## 2018-06-29 NOTE — CHART NOTE - NSCHARTNOTEFT_GEN_A_CORE
Upon Nutritional Assessment by the Registered Dietitian your patient was determined to meet criteria / has evidence of the following diagnosis/diagnoses:          [ ]  Mild Protein Calorie Malnutrition        [ ]  Moderate Protein Calorie Malnutrition        [ X ] Severe Protein Calorie Malnutrition        [ ] Unspecified Protein Calorie Malnutrition        [ ] Underweight / BMI <19        [ ] Morbid Obesity / BMI > 40      Findings as based on:  •  Comprehensive nutrition assessment and consultation  •  Calorie counts (nutrient intake analysis)  •  Food acceptance and intake status from observations by staff  •  Follow up  •  Patient education  •  Intervention secondary to interdisciplinary rounds  •   concerns      Treatment:    The following diet has been recommended:  PT WOULD BENEFIT FROM ENSURE ENLIVE TID       PROVIDER Section:     By signing this assessment you are acknowledging and agree with the diagnosis/diagnoses assigned by the Registered Dietitian    Comments:

## 2018-06-29 NOTE — DISCHARGE NOTE ADULT - OTHER SIGNIFICANT FINDINGS
< from: MR Foot No Cont, Right (06.27.18 @ 14:00) >  IMPRESSION: Status post amputation of the hallux at the level of the head   of the proximal phalanx. Adjacent signal alteration in the distal aspect   of the stump of the proximal phalanx consistent with postsurgical change   and/or osteomyelitis.    Scattered additional focal areas of marrow signal alteration within   several bones of the hindfoot, midfoot, and forefoot with differential   considerations as above.                MICHAEL DENISE M.D., ATTENDING RADIOLOGIST  This document has been electronically signed. Jun 27 2018  2:30PM      < from: Xray Foot AP + Lateral, Right (06.26.18 @ 10:17) >  Impression:  Osteomyelitis of great toe.            KEENAN DURANT M.D., ATTENDING RADIOLOGIST  This document has been electronically signed. Jun 26 2018 10:40AM

## 2018-06-29 NOTE — DISCHARGE NOTE ADULT - INSTRUCTIONS
Low sodium, Low fat diet Daily wound cleaning. Apply Idosorb. Keep Dry and Clean. Cover with gauze bandange

## 2018-06-29 NOTE — DISCHARGE NOTE ADULT - CARE PLAN
Principal Discharge DX:	Gangrene of foot  Goal:	Monitor  Assessment and plan of treatment:	Please, continue to take your medication as needed. Please follow up with your doctor  Secondary Diagnosis:	Chronic atrial fibrillation  Goal:	Monitor  Assessment and plan of treatment:	Please continue to take your medication as needed. Please follow up with your primary care doctor as well as your cardiologist.  Secondary Diagnosis:	Dementia  Goal:	Monitor  Assessment and plan of treatment:	Please take your medication as needed. Please follow up with your doctor.  Secondary Diagnosis:	PVD (peripheral vascular disease)  Goal:	Monitor  Assessment and plan of treatment:	please continue to take your medication as needed. Please follow up with your doctor as needed.  Secondary Diagnosis:	COPD (chronic obstructive pulmonary disease)  Goal:	Monitor  Assessment and plan of treatment:	please continue to take your medication as needed. Principal Discharge DX:	Gangrene of foot  Goal:	Monitor  Assessment and plan of treatment:	You were noted to have auto amputation of your right big toe secondary to gangrene. You will need to do daily cleaning of your foot, cover and keep dry and clean. Also, you will need to follow up with Podiatry within one and two weeks.  Secondary Diagnosis:	Chronic atrial fibrillation  Goal:	Monitor  Assessment and plan of treatment:	Please continue to take your medication as needed. Please, follow up with your primary care doctor as well as your cardiologist.  Goal:	Monitor  Assessment and plan of treatment:	Please take your medication as needed. Please follow up with your doctor.  Secondary Diagnosis:	PVD (peripheral vascular disease)  Goal:	Monitor  Assessment and plan of treatment:	You were noted to have  Secondary Diagnosis:	COPD (chronic obstructive pulmonary disease)  Goal:	Monitor  Assessment and plan of treatment:	Please, continue with medication as indicated and follow up with your primary care doctor. Principal Discharge DX:	Gangrene of foot  Goal:	Monitor  Assessment and plan of treatment:	You were noted to have auto amputation of your right big toe secondary to gangrene. You will need to do daily cleaning of your foot, cover and keep dry and clean. Also, you will need to follow up with Podiatry within one and two weeks.  Secondary Diagnosis:	Chronic atrial fibrillation  Goal:	Monitor  Assessment and plan of treatment:	Please continue to take your medication as needed. Please, follow up with your primary care doctor as well as your cardiologist.  Secondary Diagnosis:	PVD (peripheral vascular disease)  Goal:	Monitor  Assessment and plan of treatment:	You must continue with medication for High blood pressure and Atorvastatin as indicated, with follow up  with your primary care doctor  Secondary Diagnosis:	COPD (chronic obstructive pulmonary disease)  Goal:	Monitor  Assessment and plan of treatment:	Please, continue with medication as indicated and follow up with your primary care doctor. Principal Discharge DX:	Gangrene of foot  Goal:	Monitor  Assessment and plan of treatment:	You were noted to have auto amputation of your right big toe secondary to gangrene. You will need to do daily cleaning of your foot, cover and keep dry and clean. Also, you will need to follow up with Podiatry within one and two weeks.  Secondary Diagnosis:	Chronic atrial fibrillation  Goal:	Monitor  Assessment and plan of treatment:	Please continue to take your medication as ordered. Please, follow up with your primary care doctor as well as your cardiologist.  Secondary Diagnosis:	PVD (peripheral vascular disease)  Goal:	Monitor  Assessment and plan of treatment:	You must continue with medication for High blood pressure and Atorvastatin as indicated, with follow up  with your primary care doctor.  Secondary Diagnosis:	COPD (chronic obstructive pulmonary disease)  Goal:	Monitor  Assessment and plan of treatment:	Please, continue with medication as indicated and follow up with your primary care doctor. Principal Discharge DX:	Gangrene of foot  Goal:	Monitor  Assessment and plan of treatment:	You were noted to have auto amputation of your right big toe secondary to gangrene. You will need to do daily cleaning of your foot, cover and keep dry and clean. Also, you will need to follow up with Podiatry within one and two weeks. Please continue course of antibiotics as prescribed with probiotic. Please follow up with the infectious disease physician in week. Home care provided for wound care.     Wound care:  Wound Care Orders  Please remove old dressing  Apply idosorb to the right hallux  Apply guaze with ABD pad  Wrap with kerlix   Keep dressing clean dry and intact to the right foot  Secondary Diagnosis:	Chronic atrial fibrillation  Goal:	Monitor  Assessment and plan of treatment:	Please continue to take your medication as ordered. Please, follow up with your primary care doctor as well as your cardiologist.  Secondary Diagnosis:	COPD (chronic obstructive pulmonary disease)  Goal:	Monitor  Assessment and plan of treatment:	Please follow up  with your primary care doctor and pulmonologist.  Secondary Diagnosis:	PVD (peripheral vascular disease)  Goal:	Monitor  Assessment and plan of treatment:	Please continue with medications as prescribed. Please follow up with the vascular surgery team in 1-2 weeks. Please make an appointment at the phone number below. A tiny left groin hematoma representing a resolved/thrombosed pseudoaneurysm was noted and evaluated by the vascular team. DAVID/ PVR were performed and showed Right DAVID 0.92 and left DAVID of 0.50.

## 2018-06-29 NOTE — DISCHARGE NOTE ADULT - MEDICATION SUMMARY - MEDICATIONS TO STOP TAKING
I will STOP taking the medications listed below when I get home from the hospital:    flecainide 50 mg oral tablet  -- 1 tab(s) by mouth once a day

## 2018-06-29 NOTE — DISCHARGE NOTE ADULT - ADDITIONAL INSTRUCTIONS
Please follow up with your doctor within 2-3 days after being discharge. Follow up with:  -->Podiatry, within 1 to 2 weeks.  -->Primary Care Doctor within 1 week. Follow up with:  -->Podiatry, within 1 to 2 weeks. Continue with wound care as stated.   -->Vascular surgeon in 1- 2 weeks Dr. Piper   -->Primary Care Doctor within 1 week.

## 2018-06-29 NOTE — DISCHARGE NOTE ADULT - PLAN OF CARE
Monitor Please, continue to take your medication as needed. Please follow up with your doctor Please continue to take your medication as needed. Please follow up with your primary care doctor as well as your cardiologist. Please take your medication as needed. Please follow up with your doctor. please continue to take your medication as needed. Please follow up with your doctor as needed. please continue to take your medication as needed. 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 You will need to do daily cleaning of your foot, cover and keep dry and clean. Also, you will need to follow up with Podiatry within one and two weeks. Please continue to take your medication as needed. Please, follow up with your primary care doctor as well as your cardiologist. You were noted to have Please, continue with medication as indicated and follow up with your primary care doctor. You must continue with medication for High blood pressure and Atorvastatin as indicated, with follow up  with your primary care doctor Please continue to take your medication as ordered. Please, follow up with your primary care doctor as well as your cardiologist. You must continue with medication for High blood pressure and Atorvastatin as indicated, with follow up  with your primary care doctor. 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 You will need to do daily cleaning of your foot, cover and keep dry and clean. Also, you will need to follow up with Podiatry within one and two weeks. Please continue course of antibiotics as prescribed with probiotic. Please follow up with the infectious disease physician in week. Home care provided for wound care.     Wound care:  Wound Care Orders  Please remove old dressing  Apply idosorb to the right hallux  Apply guaze with ABD pad  Wrap with kerlix   Keep dressing clean dry and intact to the right foot Please follow up  with your primary care doctor and pulmonologist. Please continue with medications as prescribed. Please follow up with the vascular surgery team in 1-2 weeks. Please make an appointment at the phone number below. A tiny left groin hematoma representing a resolved/thrombosed pseudoaneurysm was noted and evaluated by the vascular team. DAVID/ PVR were performed and showed Right DAVID 0.92 and left DAVID of 0.50.

## 2018-06-29 NOTE — PROGRESS NOTE ADULT - SUBJECTIVE AND OBJECTIVE BOX
Patient is a 72y old  Female who presents with a chief complaint of right foot pain (26 Jun 2018 03:10)    Patient was updated by Podiatry and medicine teams about plan of care. Surgical intervention deferred due to concerns for potential poor wound healing given microvascular disease. No other complaints at the moment.    ROS: Denies any pain, fever, nausea, vomiting or abdominal pain.    Vital Signs Last 24 Hrs  T(C): 36.5 (29 Jun 2018 08:06), Max: 37 (28 Jun 2018 23:36)  T(F): 97.7 (29 Jun 2018 08:06), Max: 98.6 (28 Jun 2018 23:36)  HR: 84 (29 Jun 2018 08:06) (77 - 90)  BP: 128/70 (29 Jun 2018 09:37) (128/70 - 172/98)  BP(mean): --  RR: 17 (29 Jun 2018 08:06) (17 - 18)  SpO2: 97% (29 Jun 2018 08:06) (97% - 98%)    General: No acute distress, resting comfortably in bed  HEENT: Normocephalic/Atraumatic, PERRL, EOMI, moist mucous membranes  Neck: supple, no lymphadenopathy, no JVD  Resp: Normal respiratory rate and effort, lungs are clear to auscultation bilaterally, no crackles or wheezes  Cardio: Regular rate and rhythm, S1S2+, RRR, no murmurs  Abdomen: soft, BS+ normoactive, non-distended, non-tender  Vascular: no peripheral edema, DP pulses 2+ bilateral  MSK: FROM in all extremities, right foot covered in elastic dressing, dry, clean, intact. Metatarsal bone of the right foot is exposed with surrounding fibrin, no discharge.  Neuro: AAOx3, CN 2-12 grossly intact. Sensation reduced in right foot, sensation intact in other extremities, 5/5 strength in all extremities.    No new interval labs.    MEDICATIONS  (STANDING):  aspirin enteric coated 325 milliGRAM(s) Oral daily  brimonidine 0.2% Ophthalmic Solution 1 Drop(s) Left EYE daily  cadexomer iodine 0.9% Gel 1 Application(s) Topical daily  diltiazem    milliGRAM(s) Oral daily  enoxaparin Injectable 40 milliGRAM(s) SubCutaneous daily  latanoprost 0.005% Ophthalmic Solution 1 Drop(s) Left EYE at bedtime  levoFLOXacin IVPB 750 milliGRAM(s) IV Intermittent every 24 hours  levoFLOXacin IVPB      saccharomyces boulardii 250 milliGRAM(s) Oral two times a day  timolol 0.5% Solution 1 Drop(s) Both EYES daily  traZODone 50 milliGRAM(s) Oral at bedtime    MEDICATIONS  (PRN):  acetaminophen   Tablet. 650 milliGRAM(s) Oral every 6 hours PRN Mild Pain (1 - 3)  oxyCODONE    5 mG/acetaminophen 325 mG 1 Tablet(s) Oral every 6 hours PRN Moderate Pain (4 - 6)  oxyCODONE    5 mG/acetaminophen 325 mG 2 Tablet(s) Oral every 6 hours PRN Severe Pain (7 - 10)

## 2018-06-29 NOTE — DISCHARGE NOTE ADULT - HOSPITAL COURSE
73 y/o F, former smoker with hx of PVD, chronic Afib (not on AC likely due to hx of multiple falls), hx of left DVT (April 2018), COPD (no home O2), PVD, Glaucoma, presents with complaints of right foot pain. On admission the patient had an Xray of the right foot performed. Confirmed the presence of Osteomyelitis. Podiatry and ID was consulted. Given that is there is no active sign of infection, patient should  be treated with long term antibiotics. 71 y/o F former smoker with hx of PVD, chronic Afib (not on AC likely due to hx of recurrent falls), hx of left DVT (April 2018), COPD (no home O2), PVD, Glaucoma, chronic OM and R hallux ulceration who was admitted with right hallux dry gangrene, s/p auto amputation not consistent with chronic OM. She was started on Levaquin for empiric coverage due to allergy to Penicillin. She was evaluated by Podiatry and Infectious disease. Daily wound care with non weight bearing ambulation was recommended, as she was determined not suitable for surgery. 73 y/o F former smoker with hx of PVD, chronic Afib (not on AC likely due to hx of recurrent falls), hx of left DVT (April 2018), COPD (no home O2), PVD, Glaucoma, chronic OM and R hallux ulceration who was admitted with right hallux dry gangrene, s/p auto amputation not consistent with chronic OM. She was started on Levaquin for empiric coverage due to allergy to Penicillin. She was evaluated by Podiatry and Infectious disease. Daily wound care with non weight bearing ambulation was recommended, as she was determined not suitable for surgery due to common femoral vein thrombosed pseudoaneurysm diagnosed during her stay. She was evaluated by Physical therapy on 07/02/2018, who recommended heel weight bearing on Right foot and minimal assistance with mobility. Patient must follow up with Podiatry and Primary Care Doctor within 1 to 2 weeks. Patient medically stable for discharge. 71 y/o F former smoker with hx of PVD, chronic Afib (not on AC likely due to hx of recurrent falls), hx of left DVT (April 2018), COPD (no home O2), PVD, Glaucoma, chronic OM and R hallux ulceration who was admitted with right hallux dry gangrene, s/p auto amputation not consistent with chronic OM. She was started on Levaquin for empiric coverage due to allergy to Penicillin. She was evaluated by Podiatry and Infectious disease. Daily wound care with non weight bearing ambulation was recommended, as she was determined not suitable for surgery due to poor wound healing. Incidental finding of common femoral vein thrombosed pseudoaneurysm diagnosed during her stay. Vascular sx follow up recommended as an outpt. She was evaluated by Physical therapy on 07/02/2018, who recommended heel weight bearing on Right foot and minimal assistance with mobility. Patient must follow up with Podiatry, vascular sx and Primary Care Doctor within 1 to 2 weeks. Patient medically stable for discharge.    Discharge planning took 45 minutes 73 y/o F former smoker with hx of PVD, chronic Afib (not on AC likely due to hx of recurrent falls), hx of left DVT (April 2018), COPD (no home O2), PVD, Glaucoma, chronic OM and R hallux ulceration who was admitted with right hallux dry gangrene, s/p auto amputation not consistent with chronic OM. She was started on Levaquin for empiric coverage due to allergy to Penicillin. She was evaluated by Podiatry and Infectious disease. Daily wound care with non weight bearing ambulation was recommended, as she was determined not suitable for surgery due to poor wound healing. Incidental finding of common femoral vein thrombosed pseudoaneurysm diagnosed during her stay. Vascular sx follow up recommended as an outpt. She was evaluated by Physical therapy on 07/02/2018, who recommended heel weight bearing on Right foot and minimal assistance with mobility. Patient must follow up with Podiatry, vascular sx and Primary Care Doctor within 1 to 2 weeks. Patient medically stable for discharge.    Discharge planning took 45 minutes        Vital Signs Last 24 Hrs  T(C): 36.6 (03 Jul 2018 15:28), Max: 36.6 (03 Jul 2018 15:28)  T(F): 97.9 (03 Jul 2018 15:28), Max: 97.9 (03 Jul 2018 15:28)  HR: 84 (03 Jul 2018 15:28) (68 - 98)  BP: 124/80 (03 Jul 2018 15:28) (118/73 - 148/94)  BP(mean): --  RR: 18 (03 Jul 2018 15:28) (18 - 18)  SpO2: 96% (03 Jul 2018 15:28) (95% - 96%)    PHYSICAL EXAM:  ·	General: No acute distress, resting comfortably in bed  ·	Resp: Normal respiratory rate and effort, lungs are clear to auscultation bilaterally, no crackles or wheezes  ·	Cardio: Regular rate and rhythm, S1S2+, no murmurs  ·	Abdomen: soft, BS+ normoactive, non-distended, non-tender  ·	Extremities: right foot covered with dressing; dry, clean, intact.

## 2018-06-29 NOTE — DISCHARGE NOTE ADULT - CARE PROVIDER_API CALL
Abhijeet Torres  [address] [place] [id number]  Phone: (   )    -  Fax: (   )    - Abhijeet Torres  [address] [place] [address]  Phone: (   )    -  Fax: (   )    -    Alo Mathews (DPM), Surgery  [address] [place] [address]  Phone: [phone]  Fax: [phone]

## 2018-06-29 NOTE — PROGRESS NOTE ADULT - ASSESSMENT
71 y/o F, former smoker with hx of PVD, chronic Afib (not on AC likely due to hx of multiple falls), hx of left DVT (April 2018), COPD (no home O2), PVD, Glaucoma, with right foot 1st digit dry gangrene.     Gangrene of foot with concomitant chronic osteomyelitis  -Not septic, afebrile, no leukocytosis, lactate 0.8  -no hx of diabetes  -Negative blood cultures x2, Ulcer cultures positive for Morganella morganii sensitive to current Abx (Levofloxacin), patient allergic to Penicillin, will discontinue Vancomycin.  -MRI showing right hallux amputation, osteomyelitis on distal aspect of stump.  -Vascular consult appreciated  -Podiatry consult appreciated, surgical intervention to be deferred as below.  -ID consult appreciated, concern for poor wound healing due to microvascular disease, patient will need long term antibiotic therapy.  -Will need PICC line placed prior to discharge.    PVD  -chronic, unlikely the cause for gangrene given the presence of bilateral pedal pulses  -Vascular consult -f/u DAVID  -PT consult appreciated    Chronic Atrial fibrillation   -CHADSVASc = 5  -Currently not on AC, likely due to hx of multiple falls  -Rate control with Diltiazem ER 120mg qd  -Goal HR < 110  -Resume ASA 81mg    Recent hx of DVT (April 2018)  -Currently not on AC, likely due to hx of multiple falls  -also repeat imaging to confirm DVT    COPD  -stable, no acute exacerbation    Glaucoma  -stable  -resume home meds 73 y/o F, former smoker with hx of PVD, chronic Afib (not on AC likely due to hx of multiple falls), hx of left DVT (April 2018), COPD (no home O2), PVD, Glaucoma, with right foot 1st digit dry gangrene.     Gangrene of foot with concomitant chronic osteomyelitis  -stable  -Not septic, afebrile, no leukocytosis  -Negative blood cultures x2  -Ulcer cultures positive for Morganella morganii sensitive to current Abx (Levofloxacin), patient allergic to Penicillin, vanco discontinued  -MRI showing right hallux amputation, osteomyelitis on distal aspect of stump.  -Vascular consult appreciated, cannot perform DAVID PVR due to Lt sided thrombosed pseudoaneurysm, but either way no sx intervention needed @ this time  -Podiatry consult appreciated, surgical intervention to be deferred, local debridement prn, local wound dressings  -ID consult appreciated, concern for poor wound healing due to microvascular disease, patient will need long term antibiotic therapy, plan 6wks  -Will need PICC line placed prior to discharge, discussed with ID,  made aware of impending need for home care + iv abx for home    Lt Common Femoral Vein Thrombosed Pseudoaneurysm  -stable  -incidental finding on BL LE US  -vasc sx cs appreciated, no intervention indicated  -avoid compression    PVD  -chronic, unlikely the cause for gangrene given the presence of bilateral pedal pulses  -Vascular consult  -PT consult appreciated, home w/ home PT    Chronic Atrial fibrillation   -stable  -CHADSVASc = 5  -Currently not on AC, likely due to hx of multiple falls  -Rate control with Diltiazem ER 120mg qd  -Goal HR < 110  -ASA 81mg    Recent hx of DVT (April 2018)  -Currently not on AC, likely due to hx of multiple falls  -repeat US neg to DVT    COPD  -stable, no acute exacerbation    Glaucoma  -stable  -resume home meds 73 y/o F, former smoker with hx of PVD, chronic Afib (not on AC likely due to hx of multiple falls), hx of left DVT (April 2018), COPD (no home O2), PVD, Glaucoma, with right foot 1st digit dry gangrene.     Gangrene of Lt Great Toe with concomitant chronic osteomyelitis  -stable  -sp autoamputation of Lt great toe during admission  -Not septic, afebrile, no leukocytosis  -Negative blood cultures x2  -Ulcer cultures positive for Morganella morganii sensitive to current Abx (Levofloxacin), patient allergic to Penicillin, vanco discontinued  -MRI showing right hallux amputation, osteomyelitis on distal aspect of stump.  -Vascular consult appreciated, cannot perform DAVID PVR due to Lt sided thrombosed pseudoaneurysm, but either way no sx intervention needed @ this time  -Podiatry consult appreciated, surgical intervention to be deferred, local debridement prn, local wound dressings  -ID consult appreciated, concern for poor wound healing due to microvascular disease, patient will need long term antibiotic therapy, plan 6wks  -Will need PICC line placed prior to discharge, discussed with ID,  made aware of impending need for home care + iv abx for home    Lt Common Femoral Vein Thrombosed Pseudoaneurysm  -stable  -incidental finding on BL LE US  -vasc sx cs appreciated, no intervention indicated  -avoid compression    PVD  -chronic, unlikely the cause for gangrene given the presence of bilateral pedal pulses  -Vascular consult  -PT consult appreciated, home w/ home PT    Chronic Atrial fibrillation   -stable  -CHADSVASc = 5  -Currently not on AC, likely due to hx of multiple falls  -Rate control with Diltiazem ER 120mg qd  -Goal HR < 110  -ASA 81mg    Recent hx of DVT (April 2018)  -Currently not on AC, likely due to hx of multiple falls  -repeat US neg to DVT    COPD  -stable, no acute exacerbation    Glaucoma  -stable  -resume home meds

## 2018-06-29 NOTE — DISCHARGE NOTE ADULT - MEDICATION SUMMARY - MEDICATIONS TO TAKE
I will START or STAY ON the medications listed below when I get home from the hospital:    aspirin 81 mg oral tablet  -- 1 tab(s) by mouth once a day  -- Indication: For PVD (peripheral vascular disease)    Cardizem 120 mg oral tablet  -- 1 tab(s) by mouth once a day   -- It is very important that you take or use this exactly as directed.  Do not skip doses or discontinue unless directed by your doctor.  Some non-prescription drugs may aggravate your condition.  Read all labels carefully.  If a warning appears, check with your doctor before taking.    -- Indication: For Atrial fibrillation     traZODone 50 mg oral tablet  -- 1 tab(s) by mouth once a day (at bedtime)  -- Indication: For Depression     atorvastatin 10 mg oral tablet  -- 1 tab(s) by mouth once a day (at bedtime)  -- Indication: For hyperlipidemia     clopidogrel 75 mg oral tablet  -- 1 tab(s) by mouth once a day  -- Indication: For Prophylaxis     alendronate 70 mg oral tablet  -- 1 tab(s) by mouth once a week  -- Indication: For Prophylaxis     cadexomer iodine 0.9% topical gel  -- 1 application on skin once a day  -- Indication: For wound care     brimonidine 0.2% ophthalmic solution  -- 1 drop(s) to left eye once a day  -- Indication: For Glaucoma    latanoprost 0.005% ophthalmic solution  -- 1 drop(s) to left eye once a day (in the evening)  -- Indication: For Glaucoma    timolol hemihydrate 0.5% ophthalmic solution  -- 1 drop(s) to both eyes once a day  -- Indication: For Glaucoma    saccharomyces boulardii lyo 250 mg oral capsule  -- 1 cap(s) by mouth 2 times a day  -- Indication: For Prophylaxis     Levaquin 750 mg oral tablet  -- 1 tab(s) by mouth once a day   -- Avoid prolonged or excessive exposure to direct and/or artificial sunlight while taking this medication.  Do not take dairy products, antacids, or iron preparations within one hour of this medication.  Finish all this medication unless otherwise directed by prescriber.  May cause drowsiness or dizziness.  Medication should be taken with plenty of water.    -- Indication: For foot infection I will START or STAY ON the medications listed below when I get home from the hospital:    aspirin 81 mg oral tablet  -- 1 tab(s) by mouth once a day  -- Indication: For PVD (peripheral vascular disease)    oxyCODONE-acetaminophen 5 mg-325 mg oral tablet  -- 1 tab(s) by mouth every 8 hours, As Needed -Moderate Pain (4 - 6) MDD:3 pills  -- Indication: For Pain     Cardizem 120 mg oral tablet  -- 1 tab(s) by mouth once a day   -- It is very important that you take or use this exactly as directed.  Do not skip doses or discontinue unless directed by your doctor.  Some non-prescription drugs may aggravate your condition.  Read all labels carefully.  If a warning appears, check with your doctor before taking.    -- Indication: For Atrial fibrillation     traZODone 50 mg oral tablet  -- 1 tab(s) by mouth once a day (at bedtime)  -- Indication: For Depression     atorvastatin 10 mg oral tablet  -- 1 tab(s) by mouth once a day (at bedtime)  -- Indication: For hyperlipidemia     clopidogrel 75 mg oral tablet  -- 1 tab(s) by mouth once a day  -- Indication: For Prophylaxis     alendronate 70 mg oral tablet  -- 1 tab(s) by mouth once a week  -- Indication: For Prophylaxis     cadexomer iodine 0.9% topical gel  -- 1 application on skin once a day  -- Indication: For wound care     brimonidine 0.2% ophthalmic solution  -- 1 drop(s) to left eye once a day  -- Indication: For Glaucoma    latanoprost 0.005% ophthalmic solution  -- 1 drop(s) to left eye once a day (in the evening)  -- Indication: For Glaucoma    timolol hemihydrate 0.5% ophthalmic solution  -- 1 drop(s) to both eyes once a day  -- Indication: For Glaucoma    saccharomyces boulardii lyo 250 mg oral capsule  -- 1 cap(s) by mouth 2 times a day  -- Indication: For Prophylaxis     Levaquin 750 mg oral tablet  -- 1 tab(s) by mouth once a day   -- Avoid prolonged or excessive exposure to direct and/or artificial sunlight while taking this medication.  Do not take dairy products, antacids, or iron preparations within one hour of this medication.  Finish all this medication unless otherwise directed by prescriber.  May cause drowsiness or dizziness.  Medication should be taken with plenty of water.    -- Indication: For foot infection

## 2018-06-29 NOTE — DISCHARGE NOTE ADULT - PROVIDER TOKENS
FREE:[LAST:[Brian],FIRST:[Abhijeet],PHONE:[(   )    -],FAX:[(   )    -],ADDRESS:[[address] [place] [address]]] FREE:[LAST:[Brian],FIRST:[Abhijeet],PHONE:[(   )    -],FAX:[(   )    -],ADDRESS:[[address] [place] [address]]],TOKEN:'[id number]'

## 2018-06-29 NOTE — DIETITIAN INITIAL EVALUATION ADULT. - OTHER INFO
Pt with gangrene foot, awaiting amputation? Pt tolerating diet, intake ~75% PO intake at this time. Pt taking BOOST from home, would like Ensure Enlive in hospital. Food preferences obtained.

## 2018-06-30 DIAGNOSIS — Z88.0 ALLERGY STATUS TO PENICILLIN: ICD-10-CM

## 2018-06-30 LAB
-  AMIKACIN: SIGNIFICANT CHANGE UP
-  AMPICILLIN/SULBACTAM: SIGNIFICANT CHANGE UP
-  AMPICILLIN: SIGNIFICANT CHANGE UP
-  AZTREONAM: SIGNIFICANT CHANGE UP
-  CEFAZOLIN: SIGNIFICANT CHANGE UP
-  CEFEPIME: SIGNIFICANT CHANGE UP
-  CEFOXITIN: SIGNIFICANT CHANGE UP
-  CEFTRIAXONE: SIGNIFICANT CHANGE UP
-  CIPROFLOXACIN: SIGNIFICANT CHANGE UP
-  ERTAPENEM: SIGNIFICANT CHANGE UP
-  GENTAMICIN: SIGNIFICANT CHANGE UP
-  IMIPENEM: SIGNIFICANT CHANGE UP
-  LEVOFLOXACIN: SIGNIFICANT CHANGE UP
-  MEROPENEM: SIGNIFICANT CHANGE UP
-  NITROFURANTOIN: SIGNIFICANT CHANGE UP
-  PIPERACILLIN/TAZOBACTAM: SIGNIFICANT CHANGE UP
-  TIGECYCLINE: SIGNIFICANT CHANGE UP
-  TOBRAMYCIN: SIGNIFICANT CHANGE UP
-  TRIMETHOPRIM/SULFAMETHOXAZOLE: SIGNIFICANT CHANGE UP
ANION GAP SERPL CALC-SCNC: 19 MMOL/L — HIGH (ref 5–17)
BASOPHILS # BLD AUTO: 0.1 K/UL — SIGNIFICANT CHANGE UP (ref 0–0.2)
BASOPHILS NFR BLD AUTO: 0.7 % — SIGNIFICANT CHANGE UP (ref 0–2)
BUN SERPL-MCNC: 23 MG/DL — HIGH (ref 8–20)
CALCIUM SERPL-MCNC: 9.6 MG/DL — SIGNIFICANT CHANGE UP (ref 8.6–10.2)
CHLORIDE SERPL-SCNC: 98 MMOL/L — SIGNIFICANT CHANGE UP (ref 98–107)
CO2 SERPL-SCNC: 24 MMOL/L — SIGNIFICANT CHANGE UP (ref 22–29)
CREAT SERPL-MCNC: 0.68 MG/DL — SIGNIFICANT CHANGE UP (ref 0.5–1.3)
CULTURE RESULTS: SIGNIFICANT CHANGE UP
EOSINOPHIL # BLD AUTO: 0.3 K/UL — SIGNIFICANT CHANGE UP (ref 0–0.5)
EOSINOPHIL NFR BLD AUTO: 4 % — SIGNIFICANT CHANGE UP (ref 0–6)
GLUCOSE SERPL-MCNC: 96 MG/DL — SIGNIFICANT CHANGE UP (ref 70–115)
HCT VFR BLD CALC: 41.1 % — SIGNIFICANT CHANGE UP (ref 37–47)
HGB BLD-MCNC: 13.7 G/DL — SIGNIFICANT CHANGE UP (ref 12–16)
LYMPHOCYTES # BLD AUTO: 3 K/UL — SIGNIFICANT CHANGE UP (ref 1–4.8)
LYMPHOCYTES # BLD AUTO: 35.4 % — SIGNIFICANT CHANGE UP (ref 20–55)
MCHC RBC-ENTMCNC: 29.7 PG — SIGNIFICANT CHANGE UP (ref 27–31)
MCHC RBC-ENTMCNC: 33.3 G/DL — SIGNIFICANT CHANGE UP (ref 32–36)
MCV RBC AUTO: 89 FL — SIGNIFICANT CHANGE UP (ref 81–99)
METHOD TYPE: SIGNIFICANT CHANGE UP
MONOCYTES # BLD AUTO: 0.8 K/UL — SIGNIFICANT CHANGE UP (ref 0–0.8)
MONOCYTES NFR BLD AUTO: 9.7 % — SIGNIFICANT CHANGE UP (ref 3–10)
NEUTROPHILS # BLD AUTO: 4.2 K/UL — SIGNIFICANT CHANGE UP (ref 1.8–8)
NEUTROPHILS NFR BLD AUTO: 50 % — SIGNIFICANT CHANGE UP (ref 37–73)
ORGANISM # SPEC MICROSCOPIC CNT: SIGNIFICANT CHANGE UP
ORGANISM # SPEC MICROSCOPIC CNT: SIGNIFICANT CHANGE UP
PLATELET # BLD AUTO: 395 K/UL — SIGNIFICANT CHANGE UP (ref 150–400)
POTASSIUM SERPL-MCNC: 3.6 MMOL/L — SIGNIFICANT CHANGE UP (ref 3.5–5.3)
POTASSIUM SERPL-SCNC: 3.6 MMOL/L — SIGNIFICANT CHANGE UP (ref 3.5–5.3)
RBC # BLD: 4.62 M/UL — SIGNIFICANT CHANGE UP (ref 4.4–5.2)
RBC # FLD: 15.4 % — SIGNIFICANT CHANGE UP (ref 11–15.6)
SODIUM SERPL-SCNC: 141 MMOL/L — SIGNIFICANT CHANGE UP (ref 135–145)
SPECIMEN SOURCE: SIGNIFICANT CHANGE UP
WBC # BLD: 8.3 K/UL — SIGNIFICANT CHANGE UP (ref 4.8–10.8)
WBC # FLD AUTO: 8.3 K/UL — SIGNIFICANT CHANGE UP (ref 4.8–10.8)

## 2018-06-30 PROCEDURE — 99233 SBSQ HOSP IP/OBS HIGH 50: CPT | Mod: GC

## 2018-06-30 PROCEDURE — 99232 SBSQ HOSP IP/OBS MODERATE 35: CPT

## 2018-06-30 RX ORDER — SODIUM CHLORIDE 9 MG/ML
1000 INJECTION, SOLUTION INTRAVENOUS
Qty: 0 | Refills: 0 | Status: DISCONTINUED | OUTPATIENT
Start: 2018-06-30 | End: 2018-07-01

## 2018-06-30 RX ADMIN — Medication 250 MILLIGRAM(S): at 05:34

## 2018-06-30 RX ADMIN — Medication 1 DROP(S): at 11:40

## 2018-06-30 RX ADMIN — OXYCODONE AND ACETAMINOPHEN 2 TABLET(S): 5; 325 TABLET ORAL at 09:37

## 2018-06-30 RX ADMIN — OXYCODONE AND ACETAMINOPHEN 2 TABLET(S): 5; 325 TABLET ORAL at 03:30

## 2018-06-30 RX ADMIN — Medication 50 MILLIGRAM(S): at 21:11

## 2018-06-30 RX ADMIN — BRIMONIDINE TARTRATE 1 DROP(S): 2 SOLUTION/ DROPS OPHTHALMIC at 11:40

## 2018-06-30 RX ADMIN — ENOXAPARIN SODIUM 40 MILLIGRAM(S): 100 INJECTION SUBCUTANEOUS at 11:40

## 2018-06-30 RX ADMIN — Medication 81 MILLIGRAM(S): at 11:39

## 2018-06-30 RX ADMIN — LATANOPROST 1 DROP(S): 0.05 SOLUTION/ DROPS OPHTHALMIC; TOPICAL at 21:11

## 2018-06-30 RX ADMIN — Medication 250 MILLIGRAM(S): at 18:04

## 2018-06-30 RX ADMIN — SODIUM CHLORIDE 70 MILLILITER(S): 9 INJECTION, SOLUTION INTRAVENOUS at 18:22

## 2018-06-30 RX ADMIN — Medication 1 APPLICATION(S): at 11:39

## 2018-06-30 RX ADMIN — Medication 120 MILLIGRAM(S): at 05:34

## 2018-06-30 RX ADMIN — OXYCODONE AND ACETAMINOPHEN 2 TABLET(S): 5; 325 TABLET ORAL at 02:49

## 2018-06-30 RX ADMIN — OXYCODONE AND ACETAMINOPHEN 2 TABLET(S): 5; 325 TABLET ORAL at 10:45

## 2018-06-30 RX ADMIN — OXYCODONE AND ACETAMINOPHEN 2 TABLET(S): 5; 325 TABLET ORAL at 18:54

## 2018-06-30 RX ADMIN — OXYCODONE AND ACETAMINOPHEN 2 TABLET(S): 5; 325 TABLET ORAL at 18:21

## 2018-06-30 NOTE — PROGRESS NOTE ADULT - ASSESSMENT
73 y/o F, former smoker with hx of PVD, chronic Afib (not on AC likely due to hx of multiple falls), hx of left DVT (April 2018), COPD (no home O2), PVD, Glaucoma, with right foot 1st digit dry gangrene and chronic osteomyelitis of the foot.    Gangrene of Lt Great Toe with concomitant chronic osteomyelitis  -stable  -sp autoamputation of Lt great toe during admission  -Not septic, afebrile, no leukocytosis  -Negative blood cultures x2  -Ulcer cultures positive for Morganella morganii sensitive to current Abx (Levofloxacin), patient allergic to Penicillin, vanco discontinued  -MRI showing right hallux amputation, osteomyelitis on distal aspect of stump.  -Vascular consult appreciated, cannot perform DAVID PVR due to Lt sided thrombosed pseudoaneurysm, but either way no sx intervention needed @ this time  -Podiatry consult appreciated, surgical intervention to be deferred, local debridement prn, local wound dressings  -ID consult appreciated, concern for poor wound healing due to microvascular disease, patient will need long term antibiotic therapy, plan 6wks  -Will need PICC line placed prior to discharge, discussed with ID,  made aware of impending need for home care + iv abx for home    Lt Common Femoral Vein Thrombosed Pseudoaneurysm  -stable  -incidental finding on BL LE US  -vasc sx cs appreciated, no intervention indicated  -avoid compression    PVD  -chronic, unlikely the cause for gangrene given the presence of bilateral pedal pulses  -Vascular consult appreciated  -PT consult appreciated, home w/ home PT    Chronic Atrial fibrillation   -stable  -CHADSVASc = 5  -Currently not on AC, likely due to hx of multiple falls  -Rate control with Diltiazem ER 120mg qd  -Goal HR < 110  -ASA 81mg    Recent hx of DVT (April 2018)  -Currently not on AC, likely due to hx of multiple falls  -repeat US neg to DVT    COPD  -stable, no acute exacerbation    Glaucoma  -stable  -resume home meds 73 y/o F former smoker with hx of PVD, chronic Afib (not on AC likely due to hx of recurrent falls), hx of left DVT (April 2018), COPD (no home O2), PVD, Glaucoma, chronic OM and R foot 1st digit ulceration admitted with right foot 1st digit dry gangrene, s/p auto amputation not consistent with acute osteomyelitis but likely chronic OM.     Gangrene of Right 1st digit with concomitant chronic osteomyelitis- -stable  -Pt afebrile - no leukocytosis   -elevated esr/crp   -Negative blood cultures x2  -Ulcer cultures positive for Morganella morganii sensitive to current Abx (Levofloxacin), patient allergic to Penicillin   -MRI showing right hallux amputation, osteomyelitis on distal aspect of stump vs post auto amputation changes   -Vascular consult appreciated, cannot perform DAVID PVR due to Lt sided thrombosed pseudoaneurysm, but either way no sx intervention needed @ this time  -Podiatry f/u noted and appreciated, surgical intervention to be deferred, local debridement prn, local wound dressings  -ID f/u noted and appreciated and requested follow up for abx course duration and tx; given dry gangrene and chronic OM likely no need for long term IV abx     Lt Common Femoral Vein Thrombosed Pseudoaneurysm  -stable  -incidental finding on B/L LE US  -vasc sx f/u noted and appreciated, no intervention indicated  -avoid compression     PVD  -chronic and stable   -c/w asa 81mg po qd   -Vascular consult noted and appreciated    Chronic Atrial fibrillation   -stable  -Rate controlled  -CHADSVASc = 5  -Currently not on AC, likely due to hx of recurrent falls  -c/w Diltiazem ER 120mg po qd  -Goal HR < 110  -c/w ASA 81mg po qd     Recent hx of DVT (April 2018)  -Currently not on AC, likely due to hx of multiple falls  -repeat US neg for DVT    COPD  -stable, no acute exacerbation  -c/w albuterol prn     Glaucoma  -stable  -resume home meds    DVT PPx   -c/w lovenox 40mg sq qd     Dispo: Pt consulted and recommended home w/ home PT will request follow up. Dependent on ID recommendations for abx will determine dispo plans. 71 y/o F former smoker with hx of PVD, chronic Afib (not on AC likely due to hx of recurrent falls), hx of left DVT (April 2018), COPD (no home O2), PVD, Glaucoma, chronic OM and R foot 1st digit ulceration admitted with right foot 1st digit dry gangrene, s/p auto amputation not consistent with acute osteomyelitis but likely chronic OM. Podiatry and ID continue to follow the patient. Awaiting ID recommendations for further disposition plan.     Gangrene of Right 1st digit with concomitant chronic osteomyelitis- -stable  -Pt afebrile - no leukocytosis   -elevated esr/crp   -Negative blood cultures x2  -Ulcer cultures positive for Morganella morganii sensitive to current Abx (Levofloxacin), patient allergic to Penicillin   -MRI showing right hallux amputation, osteomyelitis on distal aspect of stump vs post auto amputation changes   -Vascular consult appreciated, cannot perform DAVID PVR due to Lt sided thrombosed pseudoaneurysm, but either way no sx intervention needed @ this time  -Podiatry f/u noted and appreciated, surgical intervention to be deferred, local debridement prn, local wound dressings  -ID f/u noted and appreciated and requested follow up for abx course duration and tx; given dry gangrene and chronic OM likely no need for long term IV abx     Lt Common Femoral Vein Thrombosed Pseudoaneurysm  -stable  -incidental finding on B/L LE US  -vasc sx f/u noted and appreciated, no intervention indicated  -avoid compression     PVD  -chronic and stable   -c/w asa 81mg po qd   -Vascular consult noted and appreciated    Chronic Atrial fibrillation   -stable  -Rate controlled  -CHADSVASc = 5  -Currently not on AC, likely due to hx of recurrent falls  -c/w Diltiazem ER 120mg po qd  -Goal HR < 110  -c/w ASA 81mg po qd     Recent hx of DVT (April 2018)  -Currently not on AC, likely due to hx of multiple falls  -repeat US neg for DVT    COPD  -stable, no acute exacerbation  -c/w albuterol prn     Glaucoma  -stable  -resume home meds    DVT PPx   -c/w lovenox 40mg sq qd     Dispo: Pt consulted and recommended home w/ home PT will request follow up. Dependent on ID recommendations for abx will determine dispo plans.

## 2018-06-30 NOTE — PROGRESS NOTE ADULT - SUBJECTIVE AND OBJECTIVE BOX
Patient is a 72y old  Female who presents with a chief complaint of right foot pain (26 Jun 2018 03:10)    Patient was updated by Podiatry and medicine teams about plan of care. Surgical intervention deferred due to concerns for potential poor wound healing given microvascular disease. No other complaints at the moment. Voiding without difficulty, last BM on 06/29    ROS: Denies any chest pain, shortness of breath, palpitations, fever, nausea, vomiting or abdominal pain.    Vital Signs Last 24 Hrs  T(C): 36.5 (30 Jun 2018 08:08), Max: 36.8 (29 Jun 2018 16:29)  T(F): 97.7 (30 Jun 2018 08:08), Max: 98.2 (29 Jun 2018 16:29)  HR: 73 (30 Jun 2018 08:08) (73 - 94)  BP: 159/88 (30 Jun 2018 08:08) (128/70 - 159/88)  BP(mean): --  RR: 20 (30 Jun 2018 08:08) (17 - 20)  SpO2: 96% (30 Jun 2018 08:08) (92% - 97%)    General: No acute distress, resting comfortably in bed  HEENT: Normocephalic/Atraumatic, PERRL, EOMI, moist mucous membranes  Neck: supple, no JVD  Resp: Normal respiratory rate and effort, lungs are clear to auscultation bilaterally, no crackles or wheezes  Cardio: Regular rate and rhythm, S1S2+, RRR, no murmurs  Abdomen: soft, BS+ normoactive, non-distended, non-tender  Vascular: no peripheral edema, DP pulses 2+ bilateral  MSK: FROM in all extremities, right foot covered in elastic dressing, dry, clean, intact.   Neuro: AAOx3, CN 2-12 grossly intact. Sensation reduced in right foot, sensation intact in other extremities, 5/5 strength in all extremities.    No new interval labs.    MEDICATIONS  (STANDING):  aspirin  chewable 81 milliGRAM(s) Oral daily  brimonidine 0.2% Ophthalmic Solution 1 Drop(s) Left EYE daily  cadexomer iodine 0.9% Gel 1 Application(s) Topical daily  diltiazem    milliGRAM(s) Oral daily  enoxaparin Injectable 40 milliGRAM(s) SubCutaneous daily  latanoprost 0.005% Ophthalmic Solution 1 Drop(s) Left EYE at bedtime  levoFLOXacin IVPB 750 milliGRAM(s) IV Intermittent every 24 hours  levoFLOXacin IVPB      saccharomyces boulardii 250 milliGRAM(s) Oral two times a day  timolol 0.5% Solution 1 Drop(s) Both EYES daily  traZODone 50 milliGRAM(s) Oral at bedtime    MEDICATIONS  (PRN):  acetaminophen   Tablet. 650 milliGRAM(s) Oral every 6 hours PRN Mild Pain (1 - 3)  oxyCODONE    5 mG/acetaminophen 325 mG 1 Tablet(s) Oral every 6 hours PRN Moderate Pain (4 - 6)  oxyCODONE    5 mG/acetaminophen 325 mG 2 Tablet(s) Oral every 6 hours PRN Severe Pain (7 - 10) Patient is a 72 year old  Female who presents with a chief complaint of right foot pain (26 Jun 2018 03:10)    HPI/ Overnight events:  Patient seen and examined at bedside. No acute events overnight. Patient was updated by Podiatry and medicine teams about plan of care. Surgical intervention deferred due to concerns for potential poor wound healing given microvascular disease. No other complaints at the moment. Pain well controlled. Voiding without difficulty, last BM on 06/29.    ROS: Denies any chest pain, shortness of breath, palpitations, fever, nausea, vomiting or abdominal pain.    Vital Signs Last 24 Hrs  T(C): 36.5 (30 Jun 2018 08:08), Max: 36.8 (29 Jun 2018 16:29)  T(F): 97.7 (30 Jun 2018 08:08), Max: 98.2 (29 Jun 2018 16:29)  HR: 73 (30 Jun 2018 08:08) (73 - 94)  BP: 159/88 (30 Jun 2018 08:08) (128/70 - 159/88)  BP(mean): --  RR: 20 (30 Jun 2018 08:08) (17 - 20)  SpO2: 96% (30 Jun 2018 08:08) (92% - 97%)    General: No acute distress, resting comfortably in bed  HEENT: Normocephalic/Atraumatic, PERRL, EOMI, moist mucous membranes  Neck: supple, no JVD  Resp: Normal respiratory rate and effort, lungs are clear to auscultation bilaterally, no crackles or wheezes  Cardio: Regular rate and rhythm, S1S2+, RRR, no murmurs  Abdomen: soft, BS+ normoactive, non-distended, non-tender  Vascular: no peripheral edema, DP pulses 2+ bilateral  MSK: FROM in all extremities, right foot covered with dressing; dry, clean, intact.   Neuro: AAOx3, CN 2-12 grossly intact. Sensation reduced in right foot, sensation intact in other extremities, 5/5 strength in all extremities.       Labs:                        13.7   8.3   )-----------( 395      ( 30 Jun 2018 07:35 )             41.1     06-30    141  |  98  |  23.0<H>  ----------------------------<  96  3.6   |  24.0  |  0.68    Ca    9.6      30 Jun 2018 07:35      MEDICATIONS  (STANDING):  aspirin  chewable 81 milliGRAM(s) Oral daily  brimonidine 0.2% Ophthalmic Solution 1 Drop(s) Left EYE daily  cadexomer iodine 0.9% Gel 1 Application(s) Topical daily  diltiazem    milliGRAM(s) Oral daily  enoxaparin Injectable 40 milliGRAM(s) SubCutaneous daily  latanoprost 0.005% Ophthalmic Solution 1 Drop(s) Left EYE at bedtime  levoFLOXacin IVPB 750 milliGRAM(s) IV Intermittent every 24 hours  levoFLOXacin IVPB      saccharomyces boulardii 250 milliGRAM(s) Oral two times a day  timolol 0.5% Solution 1 Drop(s) Both EYES daily  traZODone 50 milliGRAM(s) Oral at bedtime    MEDICATIONS  (PRN):  acetaminophen   Tablet. 650 milliGRAM(s) Oral every 6 hours PRN Mild Pain (1 - 3)  oxyCODONE    5 mG/acetaminophen 325 mG 1 Tablet(s) Oral every 6 hours PRN Moderate Pain (4 - 6)  oxyCODONE    5 mG/acetaminophen 325 mG 2 Tablet(s) Oral every 6 hours PRN Severe Pain (7 - 10)      Imaging:  No new imaging studies

## 2018-06-30 NOTE — PROGRESS NOTE ADULT - SUBJECTIVE AND OBJECTIVE BOX
[facility]  INFECTIOUS DISEASES AND INTERNAL MEDICINE at [place]  =======================================================  Seth Mccabe MD  Diplomates American Board of Internal Medicine and Infectious Diseases  =======================================================    CHERELLE BEGUM [id number]    Follow up: Rt foot 1st toe osteomyelitis    No fevers or chills  Some pain of the right toe      Allergies:  Pen-V (Anaphylaxis)      Antibiotics:  levoFLOXacin IVPB 750 milliGRAM(s) IV Intermittent every 24 hours      REVIEW OF SYSTEMS:  CONSTITUTIONAL:  No Fever or chills  HEENT:   No diplopia or blurred vision.  No earache, sore throat or runny nose.  CARDIOVASCULAR:  No pressure, squeezing, strangling, tightness, heaviness or aching about the chest, neck, axilla or epigastrium.  RESPIRATORY:  No cough, shortness of breath  GASTROINTESTINAL:  No nausea, vomiting or diarrhea.  GENITOURINARY:  No dysuria, frequency or urgency.   MUSCULOSKELETAL:  no joint aches, no muscle pain  SKIN: Right toe infection  NEUROLOGIC:  No paresthesias, fasciculations  PSYCHIATRIC:  No disorder of thought or mood.  ENDOCRINE:  No heat or cold intolerance  HEMATOLOGICAL:  No easy bruising or bleeding.       Physical Exam:  Vital Signs Last 24 Hrs  T(C): 36.5 (30 Jun 2018 08:08), Max: 36.8 (29 Jun 2018 16:29)  T(F): 97.7 (30 Jun 2018 08:08), Max: 98.2 (29 Jun 2018 16:29)  HR: 73 (30 Jun 2018 08:08) (73 - 94)  BP: 159/88 (30 Jun 2018 08:08) (140/84 - 159/88)  RR: 20 (30 Jun 2018 08:08) (17 - 20)  SpO2: 96% (30 Jun 2018 08:08) (92% - 97%)      GEN: NAD, pleasant  HEENT: normocephalic and atraumatic. EOMI. PERRL.    NECK: Supple.   LUNGS: Clear to auscultation.  HEART: Regular rate and rhythm   ABDOMEN: Soft, nontender, and nondistended.  Positive bowel sounds.    : No CVA tenderness  EXTREMITIES: Without any edema.  MSK: no joint swelling  NEUROLOGIC: Decreased sensation over rt 1st toe  PSYCHIATRIC: Appropriate affect .  SKIN: Rt foot 1st toe with open wound, exposed bone      Labs:  06-30    141  |  98  |  23.0<H>  ----------------------------<  96  3.6   |  24.0  |  0.68    Ca    9.6      30 Jun 2018 07:35                 13.7   8.3   )-----------( 395      ( 30 Jun 2018 07:35 )             41.1       RECENT CULTURES:  06-28 @ 06:52 .Urine Clean Catch (Midstream) Escherichia coli    10,000 - 49,000 CFU/mL Escherichia coli      06-26 @ 11:28 .Other right hallux ulcer Morganella morganii    Moderate Morganella morganii  Moderate Alpha hemolytic strep      06-25 @ 21:36 .Blood Blood     No growth at 48 hours        EXAM:  MR FOOT RT                        PROCEDURE DATE:  06/27/2018    INTERPRETATION:  EXAMINATION: MRI of the right foot without contrast  CLINICAL INFORMATION: Right hallux gangrene  TECHNIQUE: Multiplanar, multisequential MR imaging was performed.  FINDINGS: The patient is status post amputation of the hallux at the   level of the head of the proximal phalanx. There is skin and subcutaneous   fat thickening overlying the stump of the proximal phalanx which may be   related to postsurgical change and/or cellulitis. There is high T2 and   low T1 marrow signal within the distal aspect of the stump of the   proximal phalanx consistent with postsurgical change and/or osteomyelitis.  There is advanced first metatarsophalangeal joint arthrosis. There is   evidence of prior hallux valgus corrective surgery.  There are focal areas of nonspecific high T2 signal within the fused   proximal and middle phalanges of the fifth digit and within the fifth   proximal phalangeal head as well as within the fourth middle phalanx.    This may be related to ischemic change, although, osteomyelitis cannot be excluded.  There are also focal areas of nonspecific high T2 signal within the   dorsal and lateral aspect of the navicular, within the lateral cuneiform,   the distal cuboid, the fourth metatarsal base, and the dorsal aspect of   the talar neck possibly areas of stress reaction or ischemic change, less   likely to represent areas of osteomyelitis if there are no adjacent   cutaneous ulcerations. Serpiginous area of partially imaged signal   alteration within the calcaneus is consistent with a bone infarct.  There is chronic flattening of the heads of the second and third   metatarsals. There is superimposed secondand third metatarsophalangeal   joint arthrosis.  There is diffuse fatty atrophy and high T2 signal of the foot   musculature, suggesting subacute to chronic denervation.  IMPRESSION:   Status post amputation of the hallux at the level of the head   of the proximal phalanx. Adjacent signal alteration in the distal aspect   of the stump of the proximal phalanx consistent with postsurgical change   and/or osteomyelitis.  Scattered additional focal areas of marrow signal alteration within   several bones of the hindfoot, midfoot, and forefoot with differential   considerations as above.

## 2018-07-01 LAB
ANION GAP SERPL CALC-SCNC: 16 MMOL/L — SIGNIFICANT CHANGE UP (ref 5–17)
BASOPHILS # BLD AUTO: 0 K/UL — SIGNIFICANT CHANGE UP (ref 0–0.2)
BASOPHILS NFR BLD AUTO: 0.6 % — SIGNIFICANT CHANGE UP (ref 0–2)
BUN SERPL-MCNC: 18 MG/DL — SIGNIFICANT CHANGE UP (ref 8–20)
CALCIUM SERPL-MCNC: 8.9 MG/DL — SIGNIFICANT CHANGE UP (ref 8.6–10.2)
CHLORIDE SERPL-SCNC: 102 MMOL/L — SIGNIFICANT CHANGE UP (ref 98–107)
CO2 SERPL-SCNC: 22 MMOL/L — SIGNIFICANT CHANGE UP (ref 22–29)
CREAT SERPL-MCNC: 0.62 MG/DL — SIGNIFICANT CHANGE UP (ref 0.5–1.3)
EOSINOPHIL # BLD AUTO: 0.3 K/UL — SIGNIFICANT CHANGE UP (ref 0–0.5)
EOSINOPHIL NFR BLD AUTO: 4 % — SIGNIFICANT CHANGE UP (ref 0–6)
GLUCOSE SERPL-MCNC: 118 MG/DL — HIGH (ref 70–115)
HCT VFR BLD CALC: 38.6 % — SIGNIFICANT CHANGE UP (ref 37–47)
HGB BLD-MCNC: 12.9 G/DL — SIGNIFICANT CHANGE UP (ref 12–16)
LYMPHOCYTES # BLD AUTO: 2.7 K/UL — SIGNIFICANT CHANGE UP (ref 1–4.8)
LYMPHOCYTES # BLD AUTO: 32 % — SIGNIFICANT CHANGE UP (ref 20–55)
MCHC RBC-ENTMCNC: 29.4 PG — SIGNIFICANT CHANGE UP (ref 27–31)
MCHC RBC-ENTMCNC: 33.4 G/DL — SIGNIFICANT CHANGE UP (ref 32–36)
MCV RBC AUTO: 87.9 FL — SIGNIFICANT CHANGE UP (ref 81–99)
MONOCYTES # BLD AUTO: 0.9 K/UL — HIGH (ref 0–0.8)
MONOCYTES NFR BLD AUTO: 10.2 % — HIGH (ref 3–10)
NEUTROPHILS # BLD AUTO: 4.5 K/UL — SIGNIFICANT CHANGE UP (ref 1.8–8)
NEUTROPHILS NFR BLD AUTO: 53 % — SIGNIFICANT CHANGE UP (ref 37–73)
PLATELET # BLD AUTO: 386 K/UL — SIGNIFICANT CHANGE UP (ref 150–400)
POTASSIUM SERPL-MCNC: 3.6 MMOL/L — SIGNIFICANT CHANGE UP (ref 3.5–5.3)
POTASSIUM SERPL-SCNC: 3.6 MMOL/L — SIGNIFICANT CHANGE UP (ref 3.5–5.3)
PROCALCITONIN SERPL-MCNC: 0.06 NG/ML — HIGH (ref 0–0.04)
RBC # BLD: 4.39 M/UL — LOW (ref 4.4–5.2)
RBC # FLD: 15.3 % — SIGNIFICANT CHANGE UP (ref 11–15.6)
SODIUM SERPL-SCNC: 140 MMOL/L — SIGNIFICANT CHANGE UP (ref 135–145)
WBC # BLD: 8.5 K/UL — SIGNIFICANT CHANGE UP (ref 4.8–10.8)
WBC # FLD AUTO: 8.5 K/UL — SIGNIFICANT CHANGE UP (ref 4.8–10.8)

## 2018-07-01 PROCEDURE — 99232 SBSQ HOSP IP/OBS MODERATE 35: CPT

## 2018-07-01 PROCEDURE — 99233 SBSQ HOSP IP/OBS HIGH 50: CPT | Mod: GC

## 2018-07-01 RX ORDER — ATORVASTATIN CALCIUM 80 MG/1
10 TABLET, FILM COATED ORAL AT BEDTIME
Qty: 0 | Refills: 0 | Status: DISCONTINUED | OUTPATIENT
Start: 2018-07-01 | End: 2018-07-03

## 2018-07-01 RX ORDER — POTASSIUM CHLORIDE 20 MEQ
40 PACKET (EA) ORAL ONCE
Qty: 0 | Refills: 0 | Status: DISCONTINUED | OUTPATIENT
Start: 2018-07-01 | End: 2018-07-01

## 2018-07-01 RX ORDER — HYDRALAZINE HCL 50 MG
5 TABLET ORAL ONCE
Qty: 0 | Refills: 0 | Status: COMPLETED | OUTPATIENT
Start: 2018-07-01 | End: 2018-07-01

## 2018-07-01 RX ADMIN — OXYCODONE AND ACETAMINOPHEN 2 TABLET(S): 5; 325 TABLET ORAL at 18:11

## 2018-07-01 RX ADMIN — Medication 5 MILLIGRAM(S): at 00:22

## 2018-07-01 RX ADMIN — Medication 250 MILLIGRAM(S): at 05:27

## 2018-07-01 RX ADMIN — ATORVASTATIN CALCIUM 10 MILLIGRAM(S): 80 TABLET, FILM COATED ORAL at 21:19

## 2018-07-01 RX ADMIN — ENOXAPARIN SODIUM 40 MILLIGRAM(S): 100 INJECTION SUBCUTANEOUS at 12:20

## 2018-07-01 RX ADMIN — OXYCODONE AND ACETAMINOPHEN 2 TABLET(S): 5; 325 TABLET ORAL at 17:11

## 2018-07-01 RX ADMIN — Medication 250 MILLIGRAM(S): at 17:12

## 2018-07-01 RX ADMIN — Medication 1 APPLICATION(S): at 12:19

## 2018-07-01 RX ADMIN — LATANOPROST 1 DROP(S): 0.05 SOLUTION/ DROPS OPHTHALMIC; TOPICAL at 21:19

## 2018-07-01 RX ADMIN — OXYCODONE AND ACETAMINOPHEN 2 TABLET(S): 5; 325 TABLET ORAL at 11:00

## 2018-07-01 RX ADMIN — Medication 120 MILLIGRAM(S): at 05:27

## 2018-07-01 RX ADMIN — Medication 1 DROP(S): at 12:20

## 2018-07-01 RX ADMIN — OXYCODONE AND ACETAMINOPHEN 2 TABLET(S): 5; 325 TABLET ORAL at 10:07

## 2018-07-01 RX ADMIN — BRIMONIDINE TARTRATE 1 DROP(S): 2 SOLUTION/ DROPS OPHTHALMIC at 12:20

## 2018-07-01 RX ADMIN — Medication 50 MILLIGRAM(S): at 21:19

## 2018-07-01 RX ADMIN — Medication 81 MILLIGRAM(S): at 12:21

## 2018-07-01 NOTE — CHART NOTE - NSCHARTNOTEFT_GEN_A_CORE
Source: Patient, EMR    Current Diet: DASH    Patient reports improved appetite/intake     PO intake:  75%    Current Weight: (6/25) 45kg     % Weight Change     Pertinent Medications: MEDICATIONS  (STANDING):  aspirin  chewable 81 milliGRAM(s) Oral daily  brimonidine 0.2% Ophthalmic Solution 1 Drop(s) Left EYE daily  cadexomer iodine 0.9% Gel 1 Application(s) Topical daily  diltiazem    milliGRAM(s) Oral daily  enoxaparin Injectable 40 milliGRAM(s) SubCutaneous daily  latanoprost 0.005% Ophthalmic Solution 1 Drop(s) Left EYE at bedtime  levoFLOXacin IVPB 750 milliGRAM(s) IV Intermittent every 24 hours  levoFLOXacin IVPB      saccharomyces boulardii 250 milliGRAM(s) Oral two times a day  timolol 0.5% Solution 1 Drop(s) Both EYES daily  traZODone 50 milliGRAM(s) Oral at bedtime    MEDICATIONS  (PRN):  acetaminophen   Tablet. 650 milliGRAM(s) Oral every 6 hours PRN Mild Pain (1 - 3)  oxyCODONE    5 mG/acetaminophen 325 mG 1 Tablet(s) Oral every 6 hours PRN Moderate Pain (4 - 6)  oxyCODONE    5 mG/acetaminophen 325 mG 2 Tablet(s) Oral every 6 hours PRN Severe Pain (7 - 10)    Pertinent Labs: CBC Full  -  ( 01 Jul 2018 07:00 )  WBC Count : 8.5 K/uL  Hemoglobin : 12.9 g/dL  Hematocrit : 38.6 %  Platelet Count - Automated : 386 K/uL  Mean Cell Volume : 87.9 fl  Mean Cell Hemoglobin : 29.4 pg  Mean Cell Hemoglobin Concentration : 33.4 g/dL  Auto Neutrophil # : 4.5 K/uL  Auto Lymphocyte # : 2.7 K/uL  Auto Monocyte # : 0.9 K/uL  Auto Eosinophil # : 0.3 K/uL  Auto Basophil # : 0.0 K/uL  Auto Neutrophil % : 53.0 %  Auto Lymphocyte % : 32.0 %  Auto Monocyte % : 10.2 %  Auto Eosinophil % : 4.0 %  Auto Basophil % : 0.6 %  glu 118      Skin: Intact     Nutrition focused physical exam previously conducted - found signs of malnutrition [ ]absent [ X]present    Subcutaneous fat loss: [ ] Orbital fat pads region, [ ]Buccal fat region, [ ]Triceps region,  [ ]Ribs region    Muscle wasting: [ X]Temples region, [ ]Clavicle region, [X ]Shoulder region, [ ]Scapula region, [ ]Interosseous region,  [ ]thigh region, [X ]Calf region    Estimated Needs:   [X ] no change since previous assessment  [ ] recalculated:     Current Nutrition Diagnosis: Pt remains at high nutritional risk- Malnutrition; severe (chronic) related to inadequate protein energy intake as evidenced by 20lb (16%) wt loss x 6 months, moderate fat/muscle wasting      Recommendations: Ensure TID     Monitoring and Evaluation:   [X ] PO intake [X ] Tolerance to diet prescription [X] Weights  [X] Follow up per protocol [X] Labs:

## 2018-07-01 NOTE — PROGRESS NOTE ADULT - ASSESSMENT
73 y/o F former smoker with hx of PVD, chronic Afib (not on AC likely due to hx of recurrent falls), hx of left DVT (April 2018), COPD (no home O2), PVD, Glaucoma, chronic OM and R foot 1st digit ulceration admitted with right foot 1st digit dry gangrene, s/p auto amputation not consistent with acute osteomyelitis but likely chronic OM. Podiatry and ID continue to follow the patient. Awaiting ID recommendations for further disposition plan.     Gangrene of Right 1st digit with concomitant chronic osteomyelitis- -stable  -Pt afebrile - no leukocytosis   -elevated esr/crp   -Negative blood cultures x2  -Ulcer cultures positive for Morganella morganii sensitive to current Abx (Levofloxacin, started 06/27, x7 days), patient allergic to Penicillin   -MRI showing right hallux amputation, osteomyelitis on distal aspect of stump vs post auto amputation changes   -Vascular consult appreciated, cannot perform DAVID PVR due to Lt sided thrombosed pseudoaneurysm, but either way no sx intervention needed @ this time  -Podiatry f/u noted and appreciated, surgical intervention to be deferred, local debridement prn, local wound dressings  -ID f/u noted and appreciated and requested follow up for abx course duration and tx; given dry gangrene and chronic OM likely no need for long term IV abx     Lt Common Femoral Vein Thrombosed Pseudoaneurysm  -stable  -incidental finding on B/L LE US  -vasc sx f/u noted and appreciated, no intervention indicated  -avoid compression     PVD  -chronic and stable   -c/w asa 81mg po qd   -Vascular consult noted and appreciated    Chronic Atrial fibrillation   -stable  -Rate controlled  -CHADSVASc = 5  -Currently not on AC, likely due to hx of recurrent falls  -c/w Diltiazem ER 120mg po qd  -Goal HR < 110  -c/w ASA 81mg po qd     Recent hx of DVT (April 2018)  -Currently not on AC, likely due to hx of multiple falls  -repeat US neg for DVT    COPD  -stable, no acute exacerbation  -c/w albuterol prn     Glaucoma  -stable  -resume home meds    DVT PPx   -c/w lovenox 40mg sq qd     Dispo: Pt consulted and recommended home w/ home PT will request follow up. Dependent on ID recommendations for abx will determine dispo plans. 73 y/o F former smoker with hx of PVD, chronic Afib (not on AC likely due to hx of recurrent falls), hx of left DVT (April 2018), COPD (no home O2), PVD, Glaucoma, chronic OM and R foot 1st digit ulceration admitted with right foot 1st digit dry gangrene, s/p auto amputation not consistent with acute osteomyelitis but likely chronic OM. Podiatry and ID continue to follow the patient. Awaiting ID recommendations for further disposition plan.     Gangrene of Right 1st digit with concomitant chronic osteomyelitis   -stable  -Pt afebrile - no leukocytosis   -elevated esr/crp   -Negative blood cultures x2  -Ulcer cultures positive for Morganella morganii sensitive to current Abx (Levofloxacin, started 06/27, x7 days), patient allergic to Penicillin   -MRI showing right hallux amputation, osteomyelitis on distal aspect of stump vs post auto amputation changes   -Vascular consult appreciated, cannot perform DAVID PVR due to Lt sided thrombosed pseudoaneurysm, but either way no sx intervention needed @ this time  -Podiatry f/u noted and appreciated, surgical intervention to be deferred, local debridement prn, local wound dressings  -ID f/u noted and appreciated, will get clarification as to recommendations tomorrow about plan, whether to continue Abx or consider debridement/amputation; given dry gangrene and chronic OM likely no need for long term IV abx     Lt Common Femoral Vein Thrombosed Pseudoaneurysm  -stable  -incidental finding on B/L LE US  -vasc sx f/u noted and appreciated, no intervention indicated  -avoid compression     PVD  -chronic and stable   -c/w asa 81mg po qd   -Vascular consult noted and appreciated    Chronic Atrial fibrillation   -stable  -Rate controlled  -CHADSVASc = 5  -Currently not on AC, likely due to hx of recurrent falls  -c/w Diltiazem ER 120mg po qd  -Goal HR < 110  -c/w ASA 81mg po qd     Recent hx of DVT (April 2018)  -Currently not on AC, likely due to hx of multiple falls  -repeat US neg for DVT    COPD  -stable, no acute exacerbation  -c/w albuterol prn     Glaucoma  -stable  -resume home meds    DVT PPx   -c/w lovenox 40mg sq qd     Dispo: Pt consulted and recommended home w/ home PT will request follow up. Dependent on ID recommendations for abx will determine dispo plans. 73 y/o F former smoker with hx of PVD, chronic Afib (not on AC likely due to hx of recurrent falls), hx of left DVT (April 2018), COPD (no home O2), PVD, Glaucoma, chronic OM and R foot 1st digit ulceration admitted with right foot 1st digit dry gangrene, s/p auto amputation not consistent with acute osteomyelitis but likely chronic OM. Podiatry and ID continue to follow the patient. Pt remains on levaquin for empiric coverage. Slow clinical improvement.     Gangrene of Right 1st digit with concomitant chronic osteomyelitis   -Pt afebrile - no leukocytosis   -elevated esr/crp   -Negative blood cultures x2  -Ulcer cultures positive for Morganella morganii sensitive to current Abx (Levofloxacin, started 06/27 will need 7 days total from this date to end tomorrow am), As per ID    -MRI showing right hallux amputation, osteomyelitis on distal aspect of stump vs post auto amputation changes   -Vascular consult appreciated, cannot perform DAVID PVR due to Lt sided thrombosed pseudoaneurysm, but either way no sx intervention needed @ this time and signed off   -Podiatry f/u noted and appreciated, surgical intervention to be deferred, local debridement prn, local wound dressings   -ID f/u noted and appreciated and will f/u with the ID team in regards to further management and care.     Lt Common Femoral Vein Thrombosed Pseudoaneurysm  -stable  -incidental finding on B/L LE US  -vasc sx f/u noted and appreciated, no intervention indicated  -avoid compression   -pt to f/u with vascular as an outpt     PVD  -chronic and stable   -c/w asa 81mg po qd   -c/w atorvastatin 10mg po qhs   -Vascular consult noted and appreciated    Chronic Atrial fibrillation   -stable  -Rate controlled  -CHADSVASc = 5  -Currently not on AC, likely due to hx of recurrent falls  -c/w Diltiazem ER 120mg po qd  -Goal HR < 110  -c/w ASA 81mg po qd     Recent hx of DVT (April 2018)  -Currently not on AC, likely due to hx of multiple falls  -repeat US neg for DVT    COPD  -stable, no acute exacerbation  -c/w albuterol prn     Glaucoma  -stable  -resume home meds    DVT PPx   -c/w lovenox 40mg sq qd     Dispo: Pt consulted and recommended home w/ home PT will request follow up. Dependent on ID/PT recommendations for abx will determine dispo plans. 73 y/o F former smoker with hx of PVD, chronic Afib (not on AC likely due to hx of recurrent falls), hx of left DVT (April 2018), COPD (no home O2), PVD, Glaucoma, chronic OM and R foot 1st digit ulceration admitted with right foot 1st digit dry gangrene, s/p auto amputation not consistent with acute osteomyelitis but likely chronic OM. Podiatry and ID continue to follow the patient. Pt remains on levaquin for empiric coverage. Slow clinical improvement.     Gangrene of Right 1st digit with concomitant chronic osteomyelitis   -Pt afebrile - no leukocytosis   -elevated esr/crp   -Negative blood cultures x2  -Ulcer cultures positive for Morganella morganii sensitive to current Abx (Levofloxacin, started 06/27 will need 7 days total from this date to end tomorrow am), As per ID    -MRI showing right hallux amputation, osteomyelitis on distal aspect of stump vs post auto amputation changes   -Vascular consult appreciated, cannot perform DAVID PVR due to Lt sided thrombosed pseudoaneurysm, but either way no sx intervention needed @ this time and signed off   -Podiatry f/u noted and appreciated, surgical intervention to be deferred, local debridement prn, local wound dressings   -ID f/u noted and appreciated and will f/u with the ID team in regards to further management and care.     Lt Common Femoral Vein Thrombosed Pseudoaneurysm  -stable  -incidental finding on B/L LE US  -vasc sx f/u noted and appreciated, no intervention indicated  -avoid compression   -pt to f/u with vascular as an outpt     PVD  -chronic and stable   -c/w asa 81mg po qd   -c/w atorvastatin 10mg po qhs   -Vascular consult noted and appreciated    Chronic Atrial fibrillation   -stable  -Rate controlled  -CHADSVASc = 5  -Currently not on AC, likely due to hx of recurrent falls  -c/w Diltiazem ER 120mg po qd  -Goal HR < 110  -c/w ASA 81mg po qd     Recent hx of DVT (April 2018)  -Currently not on AC, likely due to hx of multiple falls  -repeat US neg for DVT    COPD  -stable, no acute exacerbation  -c/w albuterol prn     Glaucoma  -stable  -resume home meds    Severe Protein Calorie Malnutrition  -c/w ensure enlive po tid   -Nutrition consult noted and appreciated     DVT PPx   -c/w lovenox 40mg sq qd     Dispo: Pt consulted and recommended home w/ home PT will request follow up. Dependent on ID/PT recommendations for abx will determine dispo plans. 73 y/o F former smoker with hx of PVD, chronic Afib (not on AC likely due to hx of recurrent falls), hx of left DVT (April 2018), COPD (no home O2), PVD, Glaucoma, chronic OM and R foot 1st digit ulceration admitted with right foot 1st digit dry gangrene, s/p auto amputation not consistent with acute osteomyelitis but likely chronic OM. Podiatry and ID continue to follow the patient. Pt remains on levaquin for empiric coverage. Slow clinical improvement.     Gangrene of Right 1st digit with concomitant chronic osteomyelitis   -Pt afebrile - no leukocytosis   -elevated esr/crp   -Negative blood cultures x2  -Ulcer cultures positive for Morganella morganii sensitive to current Abx (Levofloxacin, started 06/27 will need 7 days total from this date to end tomorrow am), As per ID    -MRI showing right hallux amputation, osteomyelitis on distal aspect of stump vs post auto amputation changes   -Vascular consult appreciated, cannot perform DAVID PVR due to Lt sided thrombosed pseudoaneurysm, but either way no sx intervention needed @ this time and signed off   -Podiatry f/u noted and appreciated, surgical intervention to be deferred, local debridement prn, local wound dressings   -ID f/u noted and appreciated and will f/u with the ID team in regards to further management and care.     Lt Common Femoral Vein Thrombosed Pseudoaneurysm  -stable  -incidental finding on B/L LE US  -vasc sx f/u noted and appreciated, no intervention indicated  -avoid compression   -pt to f/u with vascular as an outpt     PVD  -chronic and stable   -c/w asa 81mg po qd   -c/w atorvastatin 10mg po qhs   -Vascular consult noted and appreciated    Chronic Atrial fibrillation   -stable  -Rate controlled  -CHADSVASc = 5  -Currently not on AC, likely due to hx of recurrent falls  -c/w Diltiazem ER 120mg po qd  -Goal HR < 110  -c/w ASA 81mg po qd     Recent hx of DVT (April 2018)  -Currently not on AC, likely due to hx of multiple falls  -repeat US neg for DVT    COPD  -stable, no acute exacerbation  -c/w albuterol prn     Glaucoma  -stable  -resume home meds    Severe Protein Calorie Malnutrition  -c/w ensure enlive po tid   -Nutrition consult noted and appreciated     DVT PPx   -c/w lovenox 40mg sq qd     Advanced Directives: DNR/DNI     Dispo: Pt consulted and recommended home w/ home PT will request follow up. Dependent on ID/PT recommendations for abx will determine dispo plans.

## 2018-07-01 NOTE — PROGRESS NOTE ADULT - PROBLEM SELECTOR PLAN 1
Continue Levaquin for 7 days as outlined in consult note unless there is surgical intervention  QTc is not prolonged
Continue Levaquin for 7 days as outlined in consult note

## 2018-07-01 NOTE — PROGRESS NOTE ADULT - SUBJECTIVE AND OBJECTIVE BOX
Patient is a 72y old  Female who presents with a chief complaint of right foot pain (29 Jun 2018 17:06)      INTERVAL HPI/OVERNIGHT EVENTS:  Patient seen and examined at bedside. Patient had an elevated BP of 179/87 overnight and was given Hydralazine 5mg IV. She states she continues to have 5/10 pain in her right foot but does not want to take Tylenol. When asked why she states "it scares her". She states she slept well. Her appetite is good, she had a BM this morning and she is urinating frequently.       Allergies  Pen-V (Anaphylaxis)      Vital Signs   T(F): 97.8 (01 Jul 2018 07:51), Max: 98.1 (30 Jun 2018 15:25)  HR: 81 (01 Jul 2018 07:51) (69 - 85)  BP: 134/81 (01 Jul 2018 07:51) (134/81 - 179/87)  BP(mean): --  RR: 18 (01 Jul 2018 07:51) (17 - 20)  SpO2: 96% (01 Jul 2018 07:51) (96% - 100%)      Daily   I&O's Detail    30 Jun 2018 07:01  -  01 Jul 2018 07:00  --------------------------------------------------------  IN:    multiple electrolytes Injection Type 1: 840 mL    Oral Fluid: 300 mL  Total IN: 1140 mL    OUT:  Total OUT: 0 mL    Total NET: 1140 mL      PHYSICAL EXAM:  General: No acute distress, resting comfortably in bed  HEENT: Normocephalic/Atraumatic, EOMI, moist mucous membranes  Neck: supple, no JVD  Resp: Normal respiratory rate and effort, lungs are clear to auscultation bilaterally, no crackles or wheezes  Cardio: Regular rate and rhythm, S1S2+, no murmurs  Abdomen: soft, BS+ normoactive, non-distended, non-tender  Vascular: no peripheral edema, DP pulses 2+ bilateral  MSK: FROM in all extremities, right foot covered with dressing; dry, clean, intact.   Neuro: AAOx3, Sensation reduced in right foot, sensation intact in other extremities, 5/5 strength in all extremities.    LABS:                        12.9   8.5   )-----------( 386      ( 01 Jul 2018 07:00 )             38.6     CBC Full  -  ( 01 Jul 2018 07:00 )  WBC Count : 8.5 K/uL  Hemoglobin : 12.9 g/dL  Hematocrit : 38.6 %  Platelet Count - Automated : 386 K/uL  Mean Cell Volume : 87.9 fl  Mean Cell Hemoglobin : 29.4 pg  Mean Cell Hemoglobin Concentration : 33.4 g/dL  Auto Neutrophil # : 4.5 K/uL  Auto Lymphocyte # : 2.7 K/uL  Auto Monocyte # : 0.9 K/uL  Auto Eosinophil # : 0.3 K/uL  Auto Basophil # : 0.0 K/uL  Auto Neutrophil % : 53.0 %  Auto Lymphocyte % : 32.0 %  Auto Monocyte % : 10.2 %  Auto Eosinophil % : 4.0 %  Auto Basophil % : 0.6 %    07-01    140  |  102  |  18.0  ----------------------------<  118<H>  3.6   |  22.0  |  0.62    Ca    8.9      01 Jul 2018 07:00      Culture Results:   10,000 - 49,000 CFU/mL Escherichia coli (06-28 @ 06:52)  Culture Results:   Moderate Morganella morganii  Moderate Alpha hemolytic strep (06-26 @ 11:28)  Culture Results:   No growth at 5 days. (06-25 @ 21:36)  Culture Results:   No growth at 5 days. (06-25 @ 21:36) Patient is a 72y old  Female who presents with a chief complaint of right foot pain (29 Jun 2018 17:06)      INTERVAL HPI/OVERNIGHT EVENTS:  Patient seen and examined at bedside. Patient had an elevated BP of 179/87 overnight and was given Hydralazine 5mg IV. She states she continues to have 5/10 pain in her right foot but does not want to take Tylenol. When asked why she states "it scares her". She states she slept well. Her appetite is good, she had a BM this morning and she is urinating frequently.     ROS: Denies any fever, chills, chest pain, shortness of breath, palpitations, nausea, vomiting, diarrhea, abdominal pain, or dysuria.    Allergies  Pen-V (Anaphylaxis)      Vital Signs   T(F): 97.8 (01 Jul 2018 07:51), Max: 98.1 (30 Jun 2018 15:25)  HR: 81 (01 Jul 2018 07:51) (69 - 85)  BP: 134/81 (01 Jul 2018 07:51) (134/81 - 179/87)  BP(mean): --  RR: 18 (01 Jul 2018 07:51) (17 - 20)  SpO2: 96% (01 Jul 2018 07:51) (96% - 100%)      Daily   I&O's Detail    30 Jun 2018 07:01  -  01 Jul 2018 07:00  --------------------------------------------------------  IN:    multiple electrolytes Injection Type 1: 840 mL    Oral Fluid: 300 mL  Total IN: 1140 mL    OUT:  Total OUT: 0 mL    Total NET: 1140 mL      PHYSICAL EXAM:  General: No acute distress, resting comfortably in bed  HEENT: Normocephalic/Atraumatic, EOMI, moist mucous membranes  Neck: supple, no JVD  Resp: Normal respiratory rate and effort, lungs are clear to auscultation bilaterally, no crackles or wheezes  Cardio: Regular rate and rhythm, S1S2+, no murmurs  Abdomen: soft, BS+ normoactive, non-distended, non-tender  Vascular: no peripheral edema, DP pulses 2+ bilateral  MSK: FROM in all extremities, right foot covered with dressing; dry, clean, intact.   Neuro: AAOx3, Sensation reduced in right foot, sensation intact in other extremities, 5/5 strength in all extremities.    LABS:                        12.9   8.5   )-----------( 386      ( 01 Jul 2018 07:00 )             38.6     CBC Full  -  ( 01 Jul 2018 07:00 )  WBC Count : 8.5 K/uL  Hemoglobin : 12.9 g/dL  Hematocrit : 38.6 %  Platelet Count - Automated : 386 K/uL  Mean Cell Volume : 87.9 fl  Mean Cell Hemoglobin : 29.4 pg  Mean Cell Hemoglobin Concentration : 33.4 g/dL  Auto Neutrophil # : 4.5 K/uL  Auto Lymphocyte # : 2.7 K/uL  Auto Monocyte # : 0.9 K/uL  Auto Eosinophil # : 0.3 K/uL  Auto Basophil # : 0.0 K/uL  Auto Neutrophil % : 53.0 %  Auto Lymphocyte % : 32.0 %  Auto Monocyte % : 10.2 %  Auto Eosinophil % : 4.0 %  Auto Basophil % : 0.6 %    07-01    140  |  102  |  18.0  ----------------------------<  118<H>  3.6   |  22.0  |  0.62    Ca    8.9      01 Jul 2018 07:00      Culture Results:   10,000 - 49,000 CFU/mL Escherichia coli (06-28 @ 06:52)  Culture Results:   Moderate Morganella morganii  Moderate Alpha hemolytic strep (06-26 @ 11:28)  Culture Results:   No growth at 5 days. (06-25 @ 21:36)  Culture Results:   No growth at 5 days. (06-25 @ 21:36) Patient is a 72y old  Female who presents with a chief complaint of right foot pain (29 Jun 2018 17:06)       OVERNIGHT/AM EVENTS:  Patient seen and examined at bedside. Patient had an elevated BP of 179/87 overnight and was given Hydralazine 5mg IV x 1 dose. She states she continues to have 5/10 pain in her right foot but does not want to take Tylenol. When asked why she states "it scares her". She states she slept well aside from this. Her appetite is good, she had a BM this morning and she is urinating frequently. She is not ambulating, was OOB to chair and does want PT. She understands that there is ongoing discussions in regards to her abx regimen.     ROS: Denies any fever, chills, chest pain, shortness of breath, palpitations, nausea, vomiting, diarrhea, abdominal pain, or dysuria.    Allergies  Pen-V (Anaphylaxis)      Vital Signs   T(F): 97.8 (01 Jul 2018 07:51), Max: 98.1 (30 Jun 2018 15:25)  HR: 81 (01 Jul 2018 07:51) (69 - 85)  BP: 134/81 (01 Jul 2018 07:51) (134/81 - 179/87)  BP(mean): --  RR: 18 (01 Jul 2018 07:51) (17 - 20)  SpO2: 96% (01 Jul 2018 07:51) (96% - 100%)      Daily   I&O's Detail    30 Jun 2018 07:01  -  01 Jul 2018 07:00  --------------------------------------------------------  IN:    multiple electrolytes Injection Type 1: 840 mL    Oral Fluid: 300 mL  Total IN: 1140 mL    OUT:  Total OUT: 0 mL    Total NET: 1140 mL      PHYSICAL EXAM:  General: No acute distress, resting comfortably in bed  HEENT: Normocephalic/Atraumatic, EOMI, moist mucous membranes  Neck: supple, no JVD  Resp: Normal respiratory rate and effort, lungs are clear to auscultation bilaterally, no crackles or wheezes  Cardio: Regular rate and rhythm, S1S2+, no murmurs  Abdomen: soft, BS+ normoactive, non-distended, non-tender  Vascular: no peripheral edema, DP pulses 2+ bilateral  Extremities: right foot covered with dressing; dry, clean, intact.   Neuro: AAOx3, Sensation reduced in right foot, sensation intact in other extremities, 5/5 strength in all extremities.    LABS:                        12.9   8.5   )-----------( 386      ( 01 Jul 2018 07:00 )             38.6     CBC Full  -  ( 01 Jul 2018 07:00 )  WBC Count : 8.5 K/uL  Hemoglobin : 12.9 g/dL  Hematocrit : 38.6 %  Platelet Count - Automated : 386 K/uL  Mean Cell Volume : 87.9 fl  Mean Cell Hemoglobin : 29.4 pg  Mean Cell Hemoglobin Concentration : 33.4 g/dL  Auto Neutrophil # : 4.5 K/uL  Auto Lymphocyte # : 2.7 K/uL  Auto Monocyte # : 0.9 K/uL  Auto Eosinophil # : 0.3 K/uL  Auto Basophil # : 0.0 K/uL  Auto Neutrophil % : 53.0 %  Auto Lymphocyte % : 32.0 %  Auto Monocyte % : 10.2 %  Auto Eosinophil % : 4.0 %  Auto Basophil % : 0.6 %    07-01    140  |  102  |  18.0  ----------------------------<  118<H>  3.6   |  22.0  |  0.62    Ca    8.9      01 Jul 2018 07:00      Culture Results:   10,000 - 49,000 CFU/mL Escherichia coli (06-28 @ 06:52)  Culture Results:   Moderate Morganella morganii  Moderate Alpha hemolytic strep (06-26 @ 11:28)  Culture Results:   No growth at 5 days. (06-25 @ 21:36)  Culture Results:   No growth at 5 days. (06-25 @ 21:36)    Imaging:  No new imaging studies

## 2018-07-01 NOTE — PROGRESS NOTE ADULT - SUBJECTIVE AND OBJECTIVE BOX
[facility]  INFECTIOUS DISEASES AND INTERNAL MEDICINE at [place]  =======================================================  Seth Mccabe MD  Diplomates American Board of Internal Medicine and Infectious Diseases  =======================================================    CHERELLE BEGUM [id number]    Follow up: Rt foot 1st toe osteomyelitis    No fevers or chills  Some pain of the right toe      Allergies:  Pen-V (Anaphylaxis)      Antibiotics:  levoFLOXacin IVPB 750 milliGRAM(s) IV Intermittent every 24 hours      REVIEW OF SYSTEMS:  CONSTITUTIONAL:  No Fever or chills  HEENT:   No diplopia or blurred vision.  No earache, sore throat or runny nose.  CARDIOVASCULAR:  No pressure, squeezing, strangling, tightness, heaviness or aching about the chest, neck, axilla or epigastrium.  RESPIRATORY:  No cough, shortness of breath  GASTROINTESTINAL:  No nausea, vomiting or diarrhea.  GENITOURINARY:  No dysuria, frequency or urgency.   MUSCULOSKELETAL:  no joint aches, no muscle pain  SKIN: Right toe infection  NEUROLOGIC:  No paresthesias, fasciculations  PSYCHIATRIC:  No disorder of thought or mood.  ENDOCRINE:  No heat or cold intolerance  HEMATOLOGICAL:  No easy bruising or bleeding.       Physical Exam:  Vital Signs Last 24 Hrs  T(C): 36.5 (30 Jun 2018 08:08), Max: 36.8 (29 Jun 2018 16:29)  T(F): 97.7 (30 Jun 2018 08:08), Max: 98.2 (29 Jun 2018 16:29)  HR: 73 (30 Jun 2018 08:08) (73 - 94)  BP: 159/88 (30 Jun 2018 08:08) (140/84 - 159/88)  RR: 20 (30 Jun 2018 08:08) (17 - 20)  SpO2: 96% (30 Jun 2018 08:08) (92% - 97%)      GEN: NAD, pleasant  HEENT: normocephalic and atraumatic. EOMI. PERRL.    NECK: Supple.   LUNGS: Clear to auscultation.  HEART: Regular rate and rhythm   ABDOMEN: Soft, nontender, and nondistended.  Positive bowel sounds.    : No CVA tenderness  EXTREMITIES: Without any edema.  MSK: no joint swelling  NEUROLOGIC: Decreased sensation over rt 1st toe  PSYCHIATRIC: Appropriate affect .  SKIN: Rt foot 1st toe with open wound, exposed bone      Labs:  06-30    141  |  98  |  23.0<H>  ----------------------------<  96  3.6   |  24.0  |  0.68    Ca    9.6      30 Jun 2018 07:35                 13.7   8.3   )-----------( 395      ( 30 Jun 2018 07:35 )             41.1       RECENT CULTURES:  06-28 @ 06:52 .Urine Clean Catch (Midstream) Escherichia coli    10,000 - 49,000 CFU/mL Escherichia coli      06-26 @ 11:28 .Other right hallux ulcer Morganella morganii    Moderate Morganella morganii  Moderate Alpha hemolytic strep      06-25 @ 21:36 .Blood Blood     No growth at 48 hours        EXAM:  MR FOOT RT                        PROCEDURE DATE:  06/27/2018    INTERPRETATION:  EXAMINATION: MRI of the right foot without contrast  CLINICAL INFORMATION: Right hallux gangrene  TECHNIQUE: Multiplanar, multisequential MR imaging was performed.  FINDINGS: The patient is status post amputation of the hallux at the   level of the head of the proximal phalanx. There is skin and subcutaneous   fat thickening overlying the stump of the proximal phalanx which may be   related to postsurgical change and/or cellulitis. There is high T2 and   low T1 marrow signal within the distal aspect of the stump of the   proximal phalanx consistent with postsurgical change and/or osteomyelitis.  There is advanced first metatarsophalangeal joint arthrosis. There is   evidence of prior hallux valgus corrective surgery.  There are focal areas of nonspecific high T2 signal within the fused   proximal and middle phalanges of the fifth digit and within the fifth   proximal phalangeal head as well as within the fourth middle phalanx.    This may be related to ischemic change, although, osteomyelitis cannot be excluded.  There are also focal areas of nonspecific high T2 signal within the   dorsal and lateral aspect of the navicular, within the lateral cuneiform,   the distal cuboid, the fourth metatarsal base, and the dorsal aspect of   the talar neck possibly areas of stress reaction or ischemic change, less   likely to represent areas of osteomyelitis if there are no adjacent   cutaneous ulcerations. Serpiginous area of partially imaged signal   alteration within the calcaneus is consistent with a bone infarct.  There is chronic flattening of the heads of the second and third   metatarsals. There is superimposed secondand third metatarsophalangeal   joint arthrosis.  There is diffuse fatty atrophy and high T2 signal of the foot   musculature, suggesting subacute to chronic denervation.  IMPRESSION:   Status post amputation of the hallux at the level of the head   of the proximal phalanx. Adjacent signal alteration in the distal aspect   of the stump of the proximal phalanx consistent with postsurgical change   and/or osteomyelitis.  Scattered additional focal areas of marrow signal alteration within   several bones of the hindfoot, midfoot, and forefoot with differential   considerations as above.

## 2018-07-02 LAB
BASOPHILS # BLD AUTO: 0.1 K/UL — SIGNIFICANT CHANGE UP (ref 0–0.2)
BASOPHILS NFR BLD AUTO: 0.8 % — SIGNIFICANT CHANGE UP (ref 0–2)
EOSINOPHIL # BLD AUTO: 0.3 K/UL — SIGNIFICANT CHANGE UP (ref 0–0.5)
EOSINOPHIL NFR BLD AUTO: 3.7 % — SIGNIFICANT CHANGE UP (ref 0–6)
HCT VFR BLD CALC: 37.5 % — SIGNIFICANT CHANGE UP (ref 37–47)
HGB BLD-MCNC: 12.4 G/DL — SIGNIFICANT CHANGE UP (ref 12–16)
LYMPHOCYTES # BLD AUTO: 2.7 K/UL — SIGNIFICANT CHANGE UP (ref 1–4.8)
LYMPHOCYTES # BLD AUTO: 29.3 % — SIGNIFICANT CHANGE UP (ref 20–55)
MCHC RBC-ENTMCNC: 29.2 PG — SIGNIFICANT CHANGE UP (ref 27–31)
MCHC RBC-ENTMCNC: 33.1 G/DL — SIGNIFICANT CHANGE UP (ref 32–36)
MCV RBC AUTO: 88.4 FL — SIGNIFICANT CHANGE UP (ref 81–99)
MONOCYTES # BLD AUTO: 0.8 K/UL — SIGNIFICANT CHANGE UP (ref 0–0.8)
MONOCYTES NFR BLD AUTO: 8.8 % — SIGNIFICANT CHANGE UP (ref 3–10)
NEUTROPHILS # BLD AUTO: 5.2 K/UL — SIGNIFICANT CHANGE UP (ref 1.8–8)
NEUTROPHILS NFR BLD AUTO: 57.1 % — SIGNIFICANT CHANGE UP (ref 37–73)
PLATELET # BLD AUTO: 389 K/UL — SIGNIFICANT CHANGE UP (ref 150–400)
RBC # BLD: 4.24 M/UL — LOW (ref 4.4–5.2)
RBC # FLD: 15.4 % — SIGNIFICANT CHANGE UP (ref 11–15.6)
WBC # BLD: 9.2 K/UL — SIGNIFICANT CHANGE UP (ref 4.8–10.8)
WBC # FLD AUTO: 9.2 K/UL — SIGNIFICANT CHANGE UP (ref 4.8–10.8)

## 2018-07-02 PROCEDURE — 99232 SBSQ HOSP IP/OBS MODERATE 35: CPT | Mod: GC

## 2018-07-02 PROCEDURE — 99232 SBSQ HOSP IP/OBS MODERATE 35: CPT

## 2018-07-02 RX ADMIN — ENOXAPARIN SODIUM 40 MILLIGRAM(S): 100 INJECTION SUBCUTANEOUS at 11:50

## 2018-07-02 RX ADMIN — Medication 1 APPLICATION(S): at 11:50

## 2018-07-02 RX ADMIN — Medication 1 DROP(S): at 11:50

## 2018-07-02 RX ADMIN — OXYCODONE AND ACETAMINOPHEN 2 TABLET(S): 5; 325 TABLET ORAL at 10:06

## 2018-07-02 RX ADMIN — OXYCODONE AND ACETAMINOPHEN 2 TABLET(S): 5; 325 TABLET ORAL at 16:15

## 2018-07-02 RX ADMIN — OXYCODONE AND ACETAMINOPHEN 2 TABLET(S): 5; 325 TABLET ORAL at 17:15

## 2018-07-02 RX ADMIN — Medication 81 MILLIGRAM(S): at 11:50

## 2018-07-02 RX ADMIN — BRIMONIDINE TARTRATE 1 DROP(S): 2 SOLUTION/ DROPS OPHTHALMIC at 11:50

## 2018-07-02 RX ADMIN — Medication 50 MILLIGRAM(S): at 21:33

## 2018-07-02 RX ADMIN — Medication 250 MILLIGRAM(S): at 05:41

## 2018-07-02 RX ADMIN — Medication 250 MILLIGRAM(S): at 17:42

## 2018-07-02 RX ADMIN — OXYCODONE AND ACETAMINOPHEN 2 TABLET(S): 5; 325 TABLET ORAL at 11:30

## 2018-07-02 RX ADMIN — Medication 120 MILLIGRAM(S): at 05:41

## 2018-07-02 RX ADMIN — ATORVASTATIN CALCIUM 10 MILLIGRAM(S): 80 TABLET, FILM COATED ORAL at 21:33

## 2018-07-02 RX ADMIN — LATANOPROST 1 DROP(S): 0.05 SOLUTION/ DROPS OPHTHALMIC; TOPICAL at 21:33

## 2018-07-02 NOTE — PROGRESS NOTE ADULT - ASSESSMENT
72 y.o. F former smoker, with PVD, prior right hallux gangrene, now s/p autoamputation  PLAN CONTINUE ABX CAN CHANGE TO ORAL COMPLETE  7 DAYS  VASCULAR EVAL NOTED NO INTERVENTION PLANNED  WILL FOLLOW UP

## 2018-07-02 NOTE — PROGRESS NOTE ADULT - SUBJECTIVE AND OBJECTIVE BOX
71 yo female with PMHx of PVD< Chronic AFib, Left DVT, COPD, Glaucoma seen at bedside for right hallux gangrene toe. Pt is NAD and AAOx 3. Pt denies N/V/C/F/SOB.         PAST MEDICAL & SURGICAL HISTORY:  Glaucoma  PVD (peripheral vascular disease)  COPD (chronic obstructive pulmonary disease)  Chronic atrial fibrillation  No significant past surgical history      ALLERGIES: Pen-V (Anaphylaxis)      MEDS:  aspirin enteric coated 325 milliGRAM(s) Oral daily  brimonidine 0.2% Ophthalmic Solution 1 Drop(s) Left EYE daily  enoxaparin Injectable 40 milliGRAM(s) SubCutaneous daily  latanoprost 0.005% Ophthalmic Solution 1 Drop(s) Left EYE at bedtime  timolol 0.5% Solution 1 Drop(s) Both EYES daily  traZODone 50 milliGRAM(s) Oral at bedtime      SOCIAL HISTORY:  Smoker:      FAMILY HISTORY:  No pertinent family history in first degree relatives    ICU Vital Signs Last 24 Hrs  T(C): 36.3 (02 Jul 2018 07:40), Max: 36.8 (01 Jul 2018 15:39)  T(F): 97.4 (02 Jul 2018 07:40), Max: 98.2 (01 Jul 2018 15:39)  HR: 79 (02 Jul 2018 07:40) (70 - 88)  BP: 144/83 (02 Jul 2018 07:40) (125/81 - 146/88)  BP(mean): --  ABP: --  ABP(mean): --  RR: 18 (02 Jul 2018 07:40) (16 - 18)  SpO2: 97% (02 Jul 2018 07:40) (96% - 99%)                          12.4   9.2   )-----------( 389      ( 02 Jul 2018 07:20 )             37.5     WBC Count: 9.2 K/uL (07-02 @ 07:20)  WBC Count: 8.5 K/uL (07-01 @ 07:00)  WBC Count: 8.3 K/uL (06-30 @ 07:35)  WBC Count: 7.6 K/uL (06-28 @ 06:59)    07-01    140  |  102  |  18.0  ----------------------------<  118<H>  3.6   |  22.0  |  0.62    Ca    8.9      01 Jul 2018 07:00        CULTURES: Gram Negative       RADIOLOGY:    Bone erosion and osteolysis in first phalanx, head of first metatarsal   and first PIP joint. Hallux valgus. Osteopenia.. Soft tissues are normal.    Impression:  Osteomyelitis of great toe.    MRI: < from: MR Foot No Cont, Right (06.27.18 @ 14:00) >  FINDINGS: The patient is status post amputation of the hallux at the   level of the head of the proximal phalanx. There is skin and subcutaneous   fat thickening overlying the stump of the proximal phalanx which may be   related to postsurgical change and/or cellulitis. There is high T2 and   low T1 marrow signal within the distal aspect of the stump of the   proximal phalanx consistent with postsurgical change and/or osteomyelitis.    There is advanced first metatarsophalangeal joint arthrosis. There is   evidence of prior hallux valgus corrective surgery.    There are focal areas of nonspecific high T2 signal within the fused   proximal and middle phalanges of the fifth digit and within the fifth   proximal phalangeal head as well as within the fourth middle phalanx.    This may be related to ischemic change, although, osteomyelitis cannot be   excluded.    There are also focal areas of nonspecific high T2 signal within the   dorsal and lateral aspect of the navicular, within the lateral cuneiform,   the distal cuboid, the fourth metatarsal base, and the dorsal aspect of   the talar neck possibly areas of stress reaction or ischemic change, less   likely to represent areas of osteomyelitis if there are no adjacent   cutaneous ulcerations. Serpiginous area of partially imaged signal   alteration within the calcaneus is consistent with a bone infarct.    There is chronic flattening of the heads of the second and third   metatarsals. There is superimposed secondand third metatarsophalangeal   joint arthrosis.    There is diffuse fatty atrophy and high T2 signal of the foot   musculature, suggesting subacute to chronic denervation.    IMPRESSION: Status post amputation of the hallux at the level of the head   of the proximal phalanx. Adjacent signal alteration in the distal aspect   of the stump of the proximal phalanx consistent with postsurgical change   and/or osteomyelitis.    Scattered additional focal areas of marrow signal alteration within   several bones of the hindfoot, midfoot, and forefoot with differential   considerations as above.      PE: Right Foot  Vascular: DP/PT: non palpable; CFT< 3 sec x 4; TG: slight warmth ; mild edema noted  Derm: Gangrene Right Hallux is deattached from the foot; bone exposed; no purulence noted; mild erythema noted; mild mal odor noted;  tendon exposed   Neuro: Protective sensation decreased     A: Right Hallux Gangrene with Osteomyelitis    P  Patient evaluated and chart reviewed  Xray reviewed and noted above   MRI reviewed; results noted above   Patient's toe was completely de-attached from the right foot. Send to Pathology; Results Pending   f/up on cx results: Morganella Morgi, Alpha Hemolytic Strep   Ordered DAVID   Iodosorb/DSD applied to the right foot   Keep dressing clean dry and intact to the right foot   Pt needs to be heel weight bearing to the right foot   WCO placed   F/up on Vascular Consult Recommendation: No acute vascular surgical intervention warranted at this time; patient with adequate perfusion to the distal aspects of the extremities  F/up ID Recommendation: Suggest to defer surgery for now as wound healing is likely going to be poor in this patient PVD and microvascular disease. Suggest IV abx for long term to treat osteomyelitis   IV abx as per ID recommendation   Discussed treatment plan thoroughly for Osteomyelitis with patient and patient's daughter (Karis: [phone]). Explained both conservative treatment ( 6 weeks IV abx ) vs surgical intervention/amputation.   Explain to patient that Podiatry will be deferring the surgery as of right now due to low potential of wound healing. Pt will be treated will long term IV abx as per ID recommendation.   Continue IV abx and local wound care.   Pt is podiatrically stable for discharge if medically stable. Pt will follow up with Dr. Navarrete as outpatient for further wound care.   Podiatry will follow pt while in house        Wound Care Orders  Please remove old dressing  Apply idosorb tot he right hallux  Apply guaze with ABD pad  Wrap with kerlix   Keep dressing clean dry and intact to the right foot 73 yo female with PMHx of PVD< Chronic AFib, Left DVT, COPD, Glaucoma seen at bedside for right hallux gangrene toe. Pt is NAD and AAOx 3. Pt denies N/V/C/F/SOB.         PAST MEDICAL & SURGICAL HISTORY:  Glaucoma  PVD (peripheral vascular disease)  COPD (chronic obstructive pulmonary disease)  Chronic atrial fibrillation  No significant past surgical history      ALLERGIES: Pen-V (Anaphylaxis)      MEDS:  aspirin enteric coated 325 milliGRAM(s) Oral daily  brimonidine 0.2% Ophthalmic Solution 1 Drop(s) Left EYE daily  enoxaparin Injectable 40 milliGRAM(s) SubCutaneous daily  latanoprost 0.005% Ophthalmic Solution 1 Drop(s) Left EYE at bedtime  timolol 0.5% Solution 1 Drop(s) Both EYES daily  traZODone 50 milliGRAM(s) Oral at bedtime      SOCIAL HISTORY:  Smoker:      FAMILY HISTORY:  No pertinent family history in first degree relatives    ICU Vital Signs Last 24 Hrs  T(C): 36.3 (02 Jul 2018 07:40), Max: 36.8 (01 Jul 2018 15:39)  T(F): 97.4 (02 Jul 2018 07:40), Max: 98.2 (01 Jul 2018 15:39)  HR: 79 (02 Jul 2018 07:40) (70 - 88)  BP: 144/83 (02 Jul 2018 07:40) (125/81 - 146/88)  BP(mean): --  ABP: --  ABP(mean): --  RR: 18 (02 Jul 2018 07:40) (16 - 18)  SpO2: 97% (02 Jul 2018 07:40) (96% - 99%)                          12.4   9.2   )-----------( 389      ( 02 Jul 2018 07:20 )             37.5     WBC Count: 9.2 K/uL (07-02 @ 07:20)  WBC Count: 8.5 K/uL (07-01 @ 07:00)  WBC Count: 8.3 K/uL (06-30 @ 07:35)  WBC Count: 7.6 K/uL (06-28 @ 06:59)    07-01    140  |  102  |  18.0  ----------------------------<  118<H>  3.6   |  22.0  |  0.62    Ca    8.9      01 Jul 2018 07:00        CULTURES: Gram Negative       RADIOLOGY:    Bone erosion and osteolysis in first phalanx, head of first metatarsal   and first PIP joint. Hallux valgus. Osteopenia.. Soft tissues are normal.    Impression:  Osteomyelitis of great toe.    MRI: < from: MR Foot No Cont, Right (06.27.18 @ 14:00) >  FINDINGS: The patient is status post amputation of the hallux at the   level of the head of the proximal phalanx. There is skin and subcutaneous   fat thickening overlying the stump of the proximal phalanx which may be   related to postsurgical change and/or cellulitis. There is high T2 and   low T1 marrow signal within the distal aspect of the stump of the   proximal phalanx consistent with postsurgical change and/or osteomyelitis.    There is advanced first metatarsophalangeal joint arthrosis. There is   evidence of prior hallux valgus corrective surgery.    There are focal areas of nonspecific high T2 signal within the fused   proximal and middle phalanges of the fifth digit and within the fifth   proximal phalangeal head as well as within the fourth middle phalanx.    This may be related to ischemic change, although, osteomyelitis cannot be   excluded.    There are also focal areas of nonspecific high T2 signal within the   dorsal and lateral aspect of the navicular, within the lateral cuneiform,   the distal cuboid, the fourth metatarsal base, and the dorsal aspect of   the talar neck possibly areas of stress reaction or ischemic change, less   likely to represent areas of osteomyelitis if there are no adjacent   cutaneous ulcerations. Serpiginous area of partially imaged signal   alteration within the calcaneus is consistent with a bone infarct.    There is chronic flattening of the heads of the second and third   metatarsals. There is superimposed secondand third metatarsophalangeal   joint arthrosis.    There is diffuse fatty atrophy and high T2 signal of the foot   musculature, suggesting subacute to chronic denervation.    IMPRESSION: Status post amputation of the hallux at the level of the head   of the proximal phalanx. Adjacent signal alteration in the distal aspect   of the stump of the proximal phalanx consistent with postsurgical change   and/or osteomyelitis.    Scattered additional focal areas of marrow signal alteration within   several bones of the hindfoot, midfoot, and forefoot with differential   considerations as above.      PE: Right Foot  Vascular: DP/PT: non palpable; CFT< 3 sec x 4; TG: slight warmth ; mild edema noted  Derm: Gangrene Right Hallux is deattached from the foot; bone exposed; no purulence noted; mild erythema noted; mild mal odor noted;  tendon exposed   Neuro: Protective sensation decreased     A: Right Hallux Gangrene with Osteomyelitis    P  Patient evaluated and chart reviewed  Xray reviewed and noted above   MRI reviewed; results noted above   Patient's toe was completely de-attached from the right foot at 1st encounter. Sent to Pathology; Results Pending   f/up on cx results: Morganella Morgi, Alpha Hemolytic Strep   Ordered DAVID   Iodosorb/DSD applied to the right foot   Keep dressing clean dry and intact to the right foot   Pt needs to be heel weight bearing to the right foot   WCO placed   F/up on Vascular Consult Recommendation: No acute vascular surgical intervention warranted at this time; patient with adequate perfusion to the distal aspects of the extremities  Possible surgery for amputation tomorrow pending ID Recommendations  Patient will need Medical Clearance for surgery tomorrow (7/3/18)  Cont IV abx as per ID recommendation   Discussed treatment plan thoroughly for Osteomyelitis with patient and patient's daughter (Karis: [phone]). Explained both conservative treatment ( 6 weeks IV abx ) vs surgical intervention/amputation.   Podiatry will follow pt while in house        Wound Care Orders  Please remove old dressing  Apply idosorb tot he right hallux  Apply guaze with ABD pad  Wrap with kerlix   Keep dressing clean dry and intact to the right foot 73 yo female with PMHx of PVD< Chronic AFib, Left DVT, COPD, Glaucoma seen at bedside for right hallux gangrene toe. Pt is NAD and AAOx 3. Pt denies N/V/C/F/SOB.         PAST MEDICAL & SURGICAL HISTORY:  Glaucoma  PVD (peripheral vascular disease)  COPD (chronic obstructive pulmonary disease)  Chronic atrial fibrillation  No significant past surgical history      ALLERGIES: Pen-V (Anaphylaxis)      MEDS:  aspirin enteric coated 325 milliGRAM(s) Oral daily  brimonidine 0.2% Ophthalmic Solution 1 Drop(s) Left EYE daily  enoxaparin Injectable 40 milliGRAM(s) SubCutaneous daily  latanoprost 0.005% Ophthalmic Solution 1 Drop(s) Left EYE at bedtime  timolol 0.5% Solution 1 Drop(s) Both EYES daily  traZODone 50 milliGRAM(s) Oral at bedtime      SOCIAL HISTORY:  Smoker:      FAMILY HISTORY:  No pertinent family history in first degree relatives    ICU Vital Signs Last 24 Hrs  T(C): 36.3 (02 Jul 2018 07:40), Max: 36.8 (01 Jul 2018 15:39)  T(F): 97.4 (02 Jul 2018 07:40), Max: 98.2 (01 Jul 2018 15:39)  HR: 79 (02 Jul 2018 07:40) (70 - 88)  BP: 144/83 (02 Jul 2018 07:40) (125/81 - 146/88)  BP(mean): --  ABP: --  ABP(mean): --  RR: 18 (02 Jul 2018 07:40) (16 - 18)  SpO2: 97% (02 Jul 2018 07:40) (96% - 99%)                          12.4   9.2   )-----------( 389      ( 02 Jul 2018 07:20 )             37.5     WBC Count: 9.2 K/uL (07-02 @ 07:20)  WBC Count: 8.5 K/uL (07-01 @ 07:00)  WBC Count: 8.3 K/uL (06-30 @ 07:35)  WBC Count: 7.6 K/uL (06-28 @ 06:59)    07-01    140  |  102  |  18.0  ----------------------------<  118<H>  3.6   |  22.0  |  0.62    Ca    8.9      01 Jul 2018 07:00        CULTURES: Gram Negative       RADIOLOGY:    Bone erosion and osteolysis in first phalanx, head of first metatarsal   and first PIP joint. Hallux valgus. Osteopenia.. Soft tissues are normal.    Impression:  Osteomyelitis of great toe.    MRI: < from: MR Foot No Cont, Right (06.27.18 @ 14:00) >  FINDINGS: The patient is status post amputation of the hallux at the   level of the head of the proximal phalanx. There is skin and subcutaneous   fat thickening overlying the stump of the proximal phalanx which may be   related to postsurgical change and/or cellulitis. There is high T2 and   low T1 marrow signal within the distal aspect of the stump of the   proximal phalanx consistent with postsurgical change and/or osteomyelitis.    There is advanced first metatarsophalangeal joint arthrosis. There is   evidence of prior hallux valgus corrective surgery.    There are focal areas of nonspecific high T2 signal within the fused   proximal and middle phalanges of the fifth digit and within the fifth   proximal phalangeal head as well as within the fourth middle phalanx.    This may be related to ischemic change, although, osteomyelitis cannot be   excluded.    There are also focal areas of nonspecific high T2 signal within the   dorsal and lateral aspect of the navicular, within the lateral cuneiform,   the distal cuboid, the fourth metatarsal base, and the dorsal aspect of   the talar neck possibly areas of stress reaction or ischemic change, less   likely to represent areas of osteomyelitis if there are no adjacent   cutaneous ulcerations. Serpiginous area of partially imaged signal   alteration within the calcaneus is consistent with a bone infarct.    There is chronic flattening of the heads of the second and third   metatarsals. There is superimposed secondand third metatarsophalangeal   joint arthrosis.    There is diffuse fatty atrophy and high T2 signal of the foot   musculature, suggesting subacute to chronic denervation.    IMPRESSION: Status post amputation of the hallux at the level of the head   of the proximal phalanx. Adjacent signal alteration in the distal aspect   of the stump of the proximal phalanx consistent with postsurgical change   and/or osteomyelitis.    Scattered additional focal areas of marrow signal alteration within   several bones of the hindfoot, midfoot, and forefoot with differential   considerations as above.      PE: Right Foot  Vascular: DP/PT: non palpable; CFT< 3 sec x 4; TG: slight warmth ; mild edema noted  Derm: Gangrene Right Hallux is deattached from the foot; bone exposed; no purulence noted; mild erythema noted; mild mal odor noted;  tendon exposed   Neuro: Protective sensation decreased     A: Right Hallux Gangrene with Osteomyelitis    P  Patient evaluated and chart reviewed  Xray reviewed and noted above   MRI reviewed; results noted above   Patient's toe was completely de-attached from the right foot at 1st encounter. Sent to Pathology; Results Pending   f/up on cx results: Morganella Morgi, Alpha Hemolytic Strep   Ordered DAVID   Iodosorb/DSD applied to the right foot   Keep dressing clean dry and intact to the right foot   Pt needs to be heel weight bearing to the right foot   WCO placed   F/up on Vascular Consult Recommendation: No acute vascular surgical intervention warranted at this time; patient with adequate perfusion to the distal aspects of the extremities  No surgical management due to risk of poor perfusion and non-healing, just continue antibiotics as per ID.   Discussed treatment plan thoroughly for Osteomyelitis with patient and patient's daughter (Karis: [phone]).   Explained both conservative treatment ( 6 weeks IV abx ) vs surgical intervention/amputation.   Podiatry will follow pt while in house        Wound Care Orders  Please remove old dressing  Apply idosorb to the right hallux  Apply guaze with ABD pad  Wrap with kerlix   Keep dressing clean dry and intact to the right foot

## 2018-07-02 NOTE — PROGRESS NOTE ADULT - SUBJECTIVE AND OBJECTIVE BOX
[facility]  INFECTIOUS DISEASES AND INTERNAL MEDICINE at [place]  =======================================================  Seth Mccabe MD  Diplomates American Board of Internal Medicine and Infectious Diseases  =======================================================    CHERELLE BEGUM [id number]    Follow up: Rt foot 1st toe osteomyelitis  S/P AUTO AMPUTATION    No fevers or chills  Some pain of the right toe      Allergies:  Pen-V (Anaphylaxis)      Antibiotics:  levoFLOXacin IVPB 750 milliGRAM(s) IV Intermittent every 24 hours      REVIEW OF SYSTEMS:  CONSTITUTIONAL:  No Fever or chills  HEENT:   No diplopia or blurred vision.  No earache, sore throat or runny nose.  CARDIOVASCULAR:  No pressure, squeezing, strangling, tightness, heaviness or aching about the chest, neck, axilla or epigastrium.  RESPIRATORY:  No cough, shortness of breath  GASTROINTESTINAL:  No nausea, vomiting or diarrhea.  GENITOURINARY:  No dysuria, frequency or urgency.   MUSCULOSKELETAL:  no joint aches, no muscle pain  SKIN: Right toe infection  NEUROLOGIC:  No paresthesias, fasciculations  PSYCHIATRIC:  No disorder of thought or mood.  ENDOCRINE:  No heat or cold intolerance  HEMATOLOGICAL:  No easy bruising or bleeding.       Physical Exam:   Vital Signs Last 24 Hrs  T(C): 36.3 (02 Jul 2018 07:40), Max: 36.8 (01 Jul 2018 15:39)  T(F): 97.4 (02 Jul 2018 07:40), Max: 98.2 (01 Jul 2018 15:39)  HR: 79 (02 Jul 2018 07:40) (70 - 88)  BP: 144/83 (02 Jul 2018 07:40) (125/81 - 146/88)  BP(mean): --  RR: 18 (02 Jul 2018 07:40) (16 - 18)  SpO2: 97% (02 Jul 2018 07:40) (96% - 99%)      GEN: NAD, pleasant  HEENT: normocephalic and atraumatic. EOMI. PERRL.    NECK: Supple.   LUNGS: Clear to auscultation.  HEART: Regular rate and rhythm   ABDOMEN: Soft, nontender, and nondistended.  Positive bowel sounds.    : No CVA tenderness     MSK: no joint swelling  NEUROLOGIC: Decreased sensation over rt 1st toe  PSYCHIATRIC: Appropriate affect .  SKIN: Rt foot 1st toe with open wound, exposed bone AREA CLEAN      Labs:  06-30    141  |  98  |  23.0<H>  ----------------------------<  96  3.6   |  24.0  |  0.68    Ca    9.6      30 Jun 2018 07:35                 13.7   8.3   )-----------( 395      ( 30 Jun 2018 07:35 )             41.1       RECENT CULTURES:  06-28 @ 06:52 .Urine Clean Catch (Midstream) Escherichia coli    10,000 - 49,000 CFU/mL Escherichia coli      06-26 @ 11:28 .Other right hallux ulcer Morganella morganii    Moderate Morganella morganii  Moderate Alpha hemolytic strep      06-25 @ 21:36 .Blood Blood     No growth at 48 hours        EXAM:  MR FOOT RT                        PROCEDURE DATE:  06/27/2018    INTERPRETATION:  EXAMINATION: MRI of the right foot without contrast  CLINICAL INFORMATION: Right hallux gangrene  TECHNIQUE: Multiplanar, multisequential MR imaging was performed.  FINDINGS: The patient is status post amputation of the hallux at the   level of the head of the proximal phalanx. There is skin and subcutaneous   fat thickening overlying the stump of the proximal phalanx which may be   related to postsurgical change and/or cellulitis. There is high T2 and   low T1 marrow signal within the distal aspect of the stump of the   proximal phalanx consistent with postsurgical change and/or osteomyelitis.  There is advanced first metatarsophalangeal joint arthrosis. There is   evidence of prior hallux valgus corrective surgery.  There are focal areas of nonspecific high T2 signal within the fused   proximal and middle phalanges of the fifth digit and within the fifth   proximal phalangeal head as well as within the fourth middle phalanx.    This may be related to ischemic change, although, osteomyelitis cannot be excluded.  There are also focal areas of nonspecific high T2 signal within the   dorsal and lateral aspect of the navicular, within the lateral cuneiform,   the distal cuboid, the fourth metatarsal base, and the dorsal aspect of   the talar neck possibly areas of stress reaction or ischemic change, less   likely to represent areas of osteomyelitis if there are no adjacent   cutaneous ulcerations. Serpiginous area of partially imaged signal   alteration within the calcaneus is consistent with a bone infarct.  There is chronic flattening of the heads of the second and third   metatarsals. There is superimposed secondand third metatarsophalangeal   joint arthrosis.  There is diffuse fatty atrophy and high T2 signal of the foot   musculature, suggesting subacute to chronic denervation.  IMPRESSION:   Status post amputation of the hallux at the level of the head   of the proximal phalanx. Adjacent signal alteration in the distal aspect   of the stump of the proximal phalanx consistent with postsurgical change   and/or osteomyelitis.  Scattered additional focal areas of marrow signal alteration within   several bones of the hindfoot, midfoot, and forefoot with differential   considerations as above.

## 2018-07-02 NOTE — PROGRESS NOTE ADULT - SUBJECTIVE AND OBJECTIVE BOX
CC: right foot pain (29 Jun 2018 17:06)     OVERNIGHT/AM EVENTS:  Patient seen and examined at bedside. Continues presentig 5/10 pain in her right foot but does not want to take pain medication. When asked why she states "it scares her". She states she slept well aside from the pain. Her appetite is poor. Bowel movement this morning and urination between normal limits. She is not ambulating, was OOB to chair and does want PT. She refers to be afraid about possible surgery/amputation. Refers that would like her nails cut. Denies any fever, chills, chest pain, shortness of breath, palpitations, nausea, vomiting, diarrhea, abdominal pain, or dysuria.    Allergies  Pen-V (Anaphylaxis)      Vital Signs Last 24 Hrs  T(C): 36.3 (02 Jul 2018 15:29), Max: 36.7 (01 Jul 2018 23:34)  T(F): 97.4 (02 Jul 2018 15:29), Max: 98 (01 Jul 2018 23:34)  HR: 78 (02 Jul 2018 15:29) (70 - 88)  BP: 139/92 (02 Jul 2018 15:29) (125/81 - 144/83)  BP(mean): --  RR: 18 (02 Jul 2018 15:29) (16 - 18)  SpO2: 99% (02 Jul 2018 15:29) (97% - 99%)    MEDICATIONS  (STANDING):  ·	aspirin  chewable 81 milliGRAM(s) Oral daily  ·	atorvastatin 10 milliGRAM(s) Oral at bedtime  ·	brimonidine 0.2% Ophthalmic Solution 1 Drop(s) Left EYE daily  ·	cadexomer iodine 0.9% Gel 1 Application(s) Topical daily  ·	diltiazem    milliGRAM(s) Oral daily  ·	enoxaparin Injectable 40 milliGRAM(s) SubCutaneous daily  ·	latanoprost 0.005% Ophthalmic Solution 1 Drop(s) Left EYE at bedtime  ·	levoFLOXacin IVPB 750 milliGRAM(s) IV Intermittent every 24 hours  ·	levoFLOXacin IVPB      ·	saccharomyces boulardii 250 milliGRAM(s) Oral two times a day  ·	timolol 0.5% Solution 1 Drop(s) Both EYES daily  ·	traZODone 50 milliGRAM(s) Oral at bedtime    MEDICATIONS  (PRN):  ·	acetaminophen   Tablet. 650 milliGRAM(s) Oral every 6 hours PRN Mild Pain (1 - 3)  ·	oxyCODONE    5 mG/acetaminophen 325 mG 1 Tablet(s) Oral every 6 hours PRN Moderate Pain (4 - 6)  ·	oxyCODONE    5 mG/acetaminophen 325 mG 2 Tablet(s) Oral every 6 hours PRN Severe Pain (7 - 10)    	  PHYSICAL EXAM:  ·	General: No acute distress, resting comfortably in bed  ·	HEENT: Normocephalic/Atraumatic, EOMI, moist mucous membranes  ·	Neck: supple, no JVD  ·	Resp: Normal respiratory rate and effort, lungs are clear to auscultation bilaterally, no crackles or wheezes  ·	Cardio: Regular rate and rhythm, S1S2+, no murmurs  ·	Abdomen: soft, BS+ normoactive, non-distended, non-tender  ·	Vascular: no peripheral edema, DP pulses 2+ bilateral  ·	Extremities: right foot covered with dressing; dry, clean, intact.   ·	Neuro: AAOx3, Sensation reduced in right foot, sensation intact in other extremities, 5/5 strength in all extremities.    LABS:                                   12.4   9.2   )-----------( 389      ( 02 Jul 2018 07:20 )             37.5     07-01    140  |  102  |  18.0  ----------------------------<  118<H>  3.6   |  22.0  |  0.62    Ca    8.9      01 Jul 2018 07:00    Procalcitonin: 0.06      Culture Results:   10,000 - 49,000 CFU/mL Escherichia coli (06-28 @ 06:52)  Culture Results:   Moderate Morganella morganii  Moderate Alpha hemolytic strep (06-26 @ 11:28)  Culture Results:   No growth at 5 days. (06-25 @ 21:36)  Culture Results:   No growth at 5 days. (06-25 @ 21:36)    Imaging:  No new imaging studies

## 2018-07-02 NOTE — PROGRESS NOTE ADULT - PROBLEM SELECTOR PROBLEM 1
Chronic osteomyelitis of toe of right foot

## 2018-07-02 NOTE — PROGRESS NOTE ADULT - ASSESSMENT
71 y/o F former smoker with hx of PVD, chronic Afib (not on AC likely due to hx of recurrent falls), hx of left DVT (April 2018), COPD (no home O2), PVD, Glaucoma, chronic OM and R foot 1st digit ulceration admitted with right foot 1st digit dry gangrene, s/p auto amputation not consistent with acute osteomyelitis but likely chronic OM. Podiatry and ID continue to follow the patient. Pt remains on levaquin for empiric coverage. Slow clinical improvement.     Gangrene of Right 1st digit with concomitant chronic osteomyelitis   -Pt afebrile - no leukocytosis   -elevated esr/crp   -Negative blood cultures x2  -Ulcer cultures positive for Morganella morganii sensitive to current Abx (Levofloxacin, started 06/27 will need 7 days total from this date to end tomorrow am), As per ID    -MRI showing right hallux amputation, osteomyelitis on distal aspect of stump vs post auto amputation changes   -Vascular consult appreciated, cannot perform DAVID PVR due to Lt sided thrombosed pseudoaneurysm, but either way no sx intervention needed @ this time and signed off   -Podiatry f/u noted and appreciated, surgical intervention to be deferred, local debridement prn, local wound dressings   -ID f/u noted and appreciated and will f/u with the ID team in regards to further management and care.  -Podiatry evaluation requested for toe nail cutting    Lt Common Femoral Vein Thrombosed Pseudoaneurysm  -stable  -incidental finding on B/L LE US  -vasc sx f/u noted and appreciated, no intervention indicated  -avoid compression   -pt to f/u with vascular as an outpt     PVD  -chronic and stable   -c/w asa 81mg po qd   -c/w atorvastatin 10mg po qhs   -Vascular consult noted and appreciated    Chronic Atrial fibrillation   -stable  -Rate controlled  -CHADSVASc = 5  -Currently not on AC, likely due to hx of recurrent falls  -c/w Diltiazem ER 120mg po qd  -Goal HR < 110  -c/w ASA 81mg po qd     Recent hx of DVT (April 2018)  -Currently not on AC, likely due to hx of multiple falls  -repeat US neg for DVT    COPD  -stable, no acute exacerbation  -c/w albuterol prn     Glaucoma  -stable  -resume home meds    Severe Protein Calorie Malnutrition  -c/w ensure enlive po tid   -Nutrition consult noted and appreciated     DVT PPx   -c/w lovenox 40mg sq qd     Advanced Directives: DNR/DNI     Dispo: Pt consulted and recommended home w/ home PT will request follow up. Dependent on ID/PT recommendations for abx will determine dispo plans. 73 y/o F former smoker with hx of PVD, chronic Afib (not on AC likely due to hx of recurrent falls), hx of left DVT (April 2018), COPD (no home O2), PVD, Glaucoma, chronic OM and R foot 1st digit ulceration admitted with right foot 1st digit dry gangrene, s/p auto amputation not consistent with acute osteomyelitis but likely chronic OM. Podiatry and ID continue to follow the patient. Pt remains on levaquin for empiric coverage. Slow clinical improvement.     Gangrene of Right 1st digit with concomitant chronic osteomyelitis   -Pt afebrile - no leukocytosis   -elevated esr/crp   -Negative blood cultures x2  -Ulcer cultures positive for Morganella morganii sensitive to current Abx (Levofloxacin, started 06/27 will need 7 days total from this date to end tomorrow am), As per ID    -MRI showing right hallux amputation, osteomyelitis on distal aspect of stump vs post auto amputation changes   -Vascular consult appreciated, cannot perform DAVID PVR due to Lt sided thrombosed pseudoaneurysm, but either way no sx intervention needed @ this time and signed off   -Podiatry f/u noted and appreciated, surgical intervention to be deferred, local debridement prn, local wound dressings   -ID f/u noted and appreciated and will f/u with the ID team in regards to further management and care.  -Podiatry evaluation requested for toe nail cutting    Lt Common Femoral Vein Thrombosed Pseudoaneurysm  -stable  -incidental finding on B/L LE US  -vasc sx f/u noted and appreciated, no intervention indicated  -avoid compression   -pt to f/u with vascular as an outpt     PVD  -chronic and stable   -c/w asa 81mg po qd   -c/w atorvastatin 10mg po qhs   -Vascular consult noted and appreciated    Chronic Atrial fibrillation   -stable  -Rate controlled  -CHADSVASc = 5  -Currently not on AC, likely due to hx of recurrent falls  -c/w Diltiazem ER 120mg po qd  -Goal HR < 110  -c/w ASA 81mg po qd     Recent hx of DVT (April 2018)  -Currently not on AC, likely due to hx of multiple falls  -repeat US neg for DVT    COPD  -stable, no acute exacerbation  -c/w albuterol prn     Glaucoma  -stable  -resume home meds    Severe Protein Calorie Malnutrition  -c/w ensure enlive po tid   -Nutrition consult noted and appreciated     DVT PPx   -c/w lovenox 40mg sq qd     Advanced Directives: DNR/DNI     Dispo: Pt consulted and recommended home w/ home PT will request follow up. Dependent on ID/PT recommendations for abx will determine dispo plans. Podiatry consultation requested for toe nails cutting. Physical therapy consultation requested for f/u and discharge plan. 73 y/o F former smoker with hx of PVD, chronic Afib (not on AC likely due to hx of recurrent falls), hx of left DVT (April 2018), COPD (no home O2), PVD, Glaucoma, chronic OM and R foot 1st digit ulceration admitted with right foot 1st digit dry gangrene, s/p auto amputation not consistent with acute osteomyelitis but likely chronic OM. Podiatry and ID continue to follow the patient. Pt remains on levaquin for empiric coverage. Slow clinical improvement. Pending PT evaluation for disposition planning.     Gangrene of Right 1st digit with concomitant chronic osteomyelitis   -Pt afebrile   - no leukocytosis     -Negative blood cultures x2  -Ulcer cultures positive for Morganella morganii sensitive to current Abx (Levofloxacin, started 06/27 will need 7 days total from this date to end tomorrow am), As per ID    -MRI showing right hallux amputation, osteomyelitis on distal aspect of stump vs post auto amputation changes   -c/w local wound care   -Vascular consult appreciated, cannot perform DAVID PVR due to Lt sided thrombosed pseudoaneurysm, but either way no sx intervention needed at this time and signed off   -Podiatry f/u noted and appreciated, surgical intervention to be deferred, local debridement prn, local wound dressings   -ID f/u noted and appreciated and D/w Dr. Rockwell who also d/w podiatry will finish course of levaquin which can be given PO,  to finish 7 day course, no plans for surgical intervention at this time.     Lt Common Femoral Vein Thrombosed Pseudoaneurysm  -stable  -incidental finding on B/L LE US  -vasc sx f/u noted and appreciated, no intervention indicated  -avoid compression   -pt to f/u with vascular as an outpt     PVD  -chronic and stable   -c/w asa 81mg po qd   -c/w atorvastatin 10mg po qhs   -Vascular consult noted and appreciated    Chronic Atrial fibrillation   -stable  -Rate controlled  -CHADSVASc = 5  -Currently not on AC, likely due to hx of recurrent falls  -c/w Diltiazem ER 120mg po qd  -Goal HR < 110  -c/w ASA 81mg po qd     Recent hx of DVT (April 2018)  -Currently not on AC, likely due to hx of multiple falls  -repeat US neg for DVT    COPD  -stable, no acute exacerbation  -c/w albuterol prn     Glaucoma  -stable  -resume home meds    Severe Protein Calorie Malnutrition  -c/w ensure enlive po tid   -Nutrition consult noted and appreciated     DVT PPx   -c/w lovenox 40mg sq qd     Advanced Directives: DNR/DNI     Dispo: Pt consulted and recommended home w/ home PT will request follow up. Disposition is based on patients ability to ambulate. D/w CM. Anticipated discharge within 24 hours.

## 2018-07-02 NOTE — PROGRESS NOTE ADULT - NSHPATTENDINGPLANDISCUSS_GEN_ALL_CORE
the patient.  All imaging and results of lab/other studies reviewed by me. All questions answered to the satisfaction of the patient. At this time they agree with the current plan of therapy.
the patient.  All imaging and results of lab/other studies reviewed by me. All questions answered to the satisfaction of the patient. At this time they agree with the current plan of therapy.
Patient
Patient
the patient.  All imaging and results of lab/other studies reviewed by me. All questions answered to the satisfaction of the patient. At this time they agree with the current plan of therapy.
Dr Fletcher
Dr Fletcher
Patient

## 2018-07-02 NOTE — PROGRESS NOTE ADULT - ATTENDING COMMENTS
Patient was seen bedside with resident.  I reviewed the above assessment and documentation completed by the resident physician.  I verbally discussed the evaluation and treatment plan with resident.  The podiatry team will continue to follow patient while in house.
Patient was seen bedside with resident.  I reviewed the above assessment and documentation completed by the resident physician.  I verbally discussed the evaluation and treatment plan with resident.  The podiatry team will continue to follow patient while in house.
Patient seen and examined at the bedside. Agree with the above history, physical, assessment, and plan with the necessary amendments/elaborations already made above.
Patient was seen bedside with resident.  I reviewed the above assessment and documentation completed by the resident physician.  I verbally discussed the evaluation and treatment plan with resident.  After discussion with medial teams at this time best healing outcome is for local wound care and long term antibiotics.  The podiatry team will continue to follow patient while in house.
Patient was seen bedside with resident.  I reviewed the above assessment and documentation completed by the resident physician.  I verbally discussed the evaluation and treatment plan with resident.  The podiatry team will continue to follow patient while in house.
Note addended where needed
Patient seen and examined at the bedside. Agree with the above history, physical, assessment, and plan with the necessary amendments/elaborations already made above.
Patient seen and examined at the bedside. Agree with the above history, physical, assessment, and plan with the necessary amendments/elaborations already made above.
Note addended where needed
Will Follow
Note addended where needed
Will Follow

## 2018-07-03 VITALS
TEMPERATURE: 98 F | SYSTOLIC BLOOD PRESSURE: 162 MMHG | OXYGEN SATURATION: 97 % | DIASTOLIC BLOOD PRESSURE: 89 MMHG | RESPIRATION RATE: 18 BRPM | HEART RATE: 85 BPM

## 2018-07-03 PROCEDURE — 85027 COMPLETE CBC AUTOMATED: CPT

## 2018-07-03 PROCEDURE — 97163 PT EVAL HIGH COMPLEX 45 MIN: CPT

## 2018-07-03 PROCEDURE — 71045 X-RAY EXAM CHEST 1 VIEW: CPT

## 2018-07-03 PROCEDURE — 86901 BLOOD TYPING SEROLOGIC RH(D): CPT

## 2018-07-03 PROCEDURE — 73718 MRI LOWER EXTREMITY W/O DYE: CPT

## 2018-07-03 PROCEDURE — 80048 BASIC METABOLIC PNL TOTAL CA: CPT

## 2018-07-03 PROCEDURE — 93923 UPR/LXTR ART STDY 3+ LVLS: CPT | Mod: 26,GV

## 2018-07-03 PROCEDURE — 93970 EXTREMITY STUDY: CPT

## 2018-07-03 PROCEDURE — 73620 X-RAY EXAM OF FOOT: CPT

## 2018-07-03 PROCEDURE — 83880 ASSAY OF NATRIURETIC PEPTIDE: CPT

## 2018-07-03 PROCEDURE — 83735 ASSAY OF MAGNESIUM: CPT

## 2018-07-03 PROCEDURE — 86850 RBC ANTIBODY SCREEN: CPT

## 2018-07-03 PROCEDURE — 83605 ASSAY OF LACTIC ACID: CPT

## 2018-07-03 PROCEDURE — 88300 SURGICAL PATH GROSS: CPT

## 2018-07-03 PROCEDURE — 36415 COLL VENOUS BLD VENIPUNCTURE: CPT

## 2018-07-03 PROCEDURE — 84100 ASSAY OF PHOSPHORUS: CPT

## 2018-07-03 PROCEDURE — 97110 THERAPEUTIC EXERCISES: CPT

## 2018-07-03 PROCEDURE — 99239 HOSP IP/OBS DSCHRG MGMT >30: CPT

## 2018-07-03 PROCEDURE — 93926 LOWER EXTREMITY STUDY: CPT

## 2018-07-03 PROCEDURE — 85610 PROTHROMBIN TIME: CPT

## 2018-07-03 PROCEDURE — 85652 RBC SED RATE AUTOMATED: CPT

## 2018-07-03 PROCEDURE — 87070 CULTURE OTHR SPECIMN AEROBIC: CPT

## 2018-07-03 PROCEDURE — 99285 EMERGENCY DEPT VISIT HI MDM: CPT

## 2018-07-03 PROCEDURE — 99222 1ST HOSP IP/OBS MODERATE 55: CPT

## 2018-07-03 PROCEDURE — 88311 DECALCIFY TISSUE: CPT

## 2018-07-03 PROCEDURE — 87186 SC STD MICRODIL/AGAR DIL: CPT

## 2018-07-03 PROCEDURE — 84145 PROCALCITONIN (PCT): CPT

## 2018-07-03 PROCEDURE — 80053 COMPREHEN METABOLIC PANEL: CPT

## 2018-07-03 PROCEDURE — 87040 BLOOD CULTURE FOR BACTERIA: CPT

## 2018-07-03 PROCEDURE — 97116 GAIT TRAINING THERAPY: CPT

## 2018-07-03 PROCEDURE — 85730 THROMBOPLASTIN TIME PARTIAL: CPT

## 2018-07-03 PROCEDURE — 86140 C-REACTIVE PROTEIN: CPT

## 2018-07-03 PROCEDURE — 80202 ASSAY OF VANCOMYCIN: CPT

## 2018-07-03 PROCEDURE — 93005 ELECTROCARDIOGRAM TRACING: CPT

## 2018-07-03 PROCEDURE — 87086 URINE CULTURE/COLONY COUNT: CPT

## 2018-07-03 PROCEDURE — 93926 LOWER EXTREMITY STUDY: CPT | Mod: 26,LT

## 2018-07-03 PROCEDURE — 86900 BLOOD TYPING SEROLOGIC ABO: CPT

## 2018-07-03 PROCEDURE — 97530 THERAPEUTIC ACTIVITIES: CPT

## 2018-07-03 PROCEDURE — 93923 UPR/LXTR ART STDY 3+ LVLS: CPT

## 2018-07-03 RX ORDER — ATORVASTATIN CALCIUM 80 MG/1
1 TABLET, FILM COATED ORAL
Qty: 30 | Refills: 0 | OUTPATIENT
Start: 2018-07-03 | End: 2018-08-01

## 2018-07-03 RX ORDER — DILTIAZEM HCL 120 MG
1 CAPSULE, EXT RELEASE 24 HR ORAL
Qty: 0 | Refills: 0 | COMMUNITY

## 2018-07-03 RX ORDER — CADEXOMER IODINE 0.9 %
1 PADS, MEDICATED (EA) TOPICAL
Qty: 2 | Refills: 0 | OUTPATIENT
Start: 2018-07-03 | End: 2018-08-01

## 2018-07-03 RX ORDER — DILTIAZEM HCL 120 MG
1 CAPSULE, EXT RELEASE 24 HR ORAL
Qty: 30 | Refills: 0 | OUTPATIENT
Start: 2018-07-03 | End: 2018-08-01

## 2018-07-03 RX ORDER — CADEXOMER IODINE 0.9 %
1 PADS, MEDICATED (EA) TOPICAL
Qty: 1 | Refills: 0 | OUTPATIENT
Start: 2018-07-03 | End: 2018-07-03

## 2018-07-03 RX ORDER — SACCHAROMYCES BOULARDII 250 MG
1 POWDER IN PACKET (EA) ORAL
Qty: 6 | Refills: 0 | OUTPATIENT
Start: 2018-07-03 | End: 2018-07-05

## 2018-07-03 RX ORDER — FLECAINIDE ACETATE 50 MG
1 TABLET ORAL
Qty: 0 | Refills: 0 | COMMUNITY

## 2018-07-03 RX ADMIN — BRIMONIDINE TARTRATE 1 DROP(S): 2 SOLUTION/ DROPS OPHTHALMIC at 12:24

## 2018-07-03 RX ADMIN — Medication 1 APPLICATION(S): at 12:24

## 2018-07-03 RX ADMIN — ENOXAPARIN SODIUM 40 MILLIGRAM(S): 100 INJECTION SUBCUTANEOUS at 12:24

## 2018-07-03 RX ADMIN — Medication 250 MILLIGRAM(S): at 17:09

## 2018-07-03 RX ADMIN — Medication 1 DROP(S): at 12:24

## 2018-07-03 RX ADMIN — Medication 250 MILLIGRAM(S): at 06:22

## 2018-07-03 RX ADMIN — OXYCODONE AND ACETAMINOPHEN 1 TABLET(S): 5; 325 TABLET ORAL at 03:08

## 2018-07-03 RX ADMIN — OXYCODONE AND ACETAMINOPHEN 2 TABLET(S): 5; 325 TABLET ORAL at 17:07

## 2018-07-03 RX ADMIN — OXYCODONE AND ACETAMINOPHEN 1 TABLET(S): 5; 325 TABLET ORAL at 04:00

## 2018-07-03 RX ADMIN — Medication 120 MILLIGRAM(S): at 06:22

## 2018-07-03 RX ADMIN — Medication 81 MILLIGRAM(S): at 12:24

## 2018-07-03 RX ADMIN — OXYCODONE AND ACETAMINOPHEN 2 TABLET(S): 5; 325 TABLET ORAL at 16:00

## 2018-07-03 NOTE — PROGRESS NOTE ADULT - SUBJECTIVE AND OBJECTIVE BOX
71 yo female seen at bedside for right hallux gangrene toe. Patient states she is doing well. Pt is NAD and AAOx 3. Pt denies N/V/C/F/SOB.    PAST MEDICAL & SURGICAL HISTORY:  Glaucoma  PVD (peripheral vascular disease)  COPD (chronic obstructive pulmonary disease)  Chronic atrial fibrillation  No significant past surgical history      ALLERGIES: Pen-V (Anaphylaxis)      MEDS:  aspirin enteric coated 325 milliGRAM(s) Oral daily  brimonidine 0.2% Ophthalmic Solution 1 Drop(s) Left EYE daily  enoxaparin Injectable 40 milliGRAM(s) SubCutaneous daily  latanoprost 0.005% Ophthalmic Solution 1 Drop(s) Left EYE at bedtime  timolol 0.5% Solution 1 Drop(s) Both EYES daily  traZODone 50 milliGRAM(s) Oral at bedtime      SOCIAL HISTORY:  Smoker:      FAMILY HISTORY:  No pertinent family history in first degree relatives    ICU Vital Signs Last 24 Hrs  T(C): 36.4 (03 Jul 2018 07:42), Max: 36.5 (02 Jul 2018 23:57)  T(F): 97.5 (03 Jul 2018 07:42), Max: 97.7 (02 Jul 2018 23:57)  HR: 68 (03 Jul 2018 07:42) (68 - 98)  BP: 118/73 (03 Jul 2018 07:42) (118/73 - 148/94)  BP(mean): --  ABP: --  ABP(mean): --  RR: 18 (03 Jul 2018 07:42) (18 - 18)  SpO2: 95% (03 Jul 2018 07:42) (95% - 99%)                          12.4   9.2   )-----------( 389      ( 02 Jul 2018 07:20 )             37.5     WBC Count: 9.2 K/uL (07-02 @ 07:20)  WBC Count: 8.5 K/uL (07-01 @ 07:00)  WBC Count: 8.3 K/uL (06-30 @ 07:35)        CULTURES: Gram Negative     PE: Right Foot  Vascular: DP/PT: non palpable; CFT< 3 sec x 4; TG: slight warmth ; mild edema noted  Derm: Gangrene Right Hallux is deattached from the foot; bone exposed; no purulence noted; mild erythema noted; mild mal odor noted;  tendon exposed   Neuro: Protective sensation decreased     RADIOLOGY:    Bone erosion and osteolysis in first phalanx, head of first metatarsal   and first PIP joint. Hallux valgus. Osteopenia.. Soft tissues are normal.    Impression:  Osteomyelitis of great toe.    MRI: < from: MR Foot No Cont, Right (06.27.18 @ 14:00) >  FINDINGS: The patient is status post amputation of the hallux at the   level of the head of the proximal phalanx. There is skin and subcutaneous   fat thickening overlying the stump of the proximal phalanx which may be   related to postsurgical change and/or cellulitis. There is high T2 and   low T1 marrow signal within the distal aspect of the stump of the   proximal phalanx consistent with postsurgical change and/or osteomyelitis.    There is advanced first metatarsophalangeal joint arthrosis. There is   evidence of prior hallux valgus corrective surgery.    There are focal areas of nonspecific high T2 signal within the fused   proximal and middle phalanges of the fifth digit and within the fifth   proximal phalangeal head as well as within the fourth middle phalanx.    This may be related to ischemic change, although, osteomyelitis cannot be   excluded.    There are also focal areas of nonspecific high T2 signal within the   dorsal and lateral aspect of the navicular, within the lateral cuneiform,   the distal cuboid, the fourth metatarsal base, and the dorsal aspect of   the talar neck possibly areas of stress reaction or ischemic change, less   likely to represent areas of osteomyelitis if there are no adjacent   cutaneous ulcerations. Serpiginous area of partially imaged signal   alteration within the calcaneus is consistent with a bone infarct.    There is chronic flattening of the heads of the second and third   metatarsals. There is superimposed secondand third metatarsophalangeal   joint arthrosis.    There is diffuse fatty atrophy and high T2 signal of the foot   musculature, suggesting subacute to chronic denervation.    IMPRESSION: Status post amputation of the hallux at the level of the head   of the proximal phalanx. Adjacent signal alteration in the distal aspect   of the stump of the proximal phalanx consistent with postsurgical change   and/or osteomyelitis.    Scattered additional focal areas of marrow signal alteration within   several bones of the hindfoot, midfoot, and forefoot with differential   considerations as above.        A: Right Hallux Gangrene with Osteomyelitis    P  Patient evaluated and chart reviewed  Xray reviewed and noted above   MRI reviewed; results noted above   Patient's toe was completely de-attached from the right foot at 1st encounter. Sent to Pathology; Results Pending   f/up on cx results: Morganella Elmergi, Alpha Hemolytic Strep   Ordered DAVID   Iodosorb/DSD applied to the right foot   Keep dressing clean dry and intact to the right foot   Pt needs to be heel weight bearing to the right foot   WCO placed   F/up on Vascular Consult Recommendation: No acute vascular surgical intervention warranted at this time; patient with adequate perfusion to the distal aspects of the extremities  No surgical management due to risk of poor perfusion and non-healing, just continue antibiotics as per ID.   Discussed treatment plan thoroughly for Osteomyelitis with patient and patient's daughter (Karis: [phone]).   Podiatry will follow pt while in house        Wound Care Orders  Please remove old dressing  Apply idosorb to the right hallux  Apply guaze with ABD pad  Wrap with kerlix   Keep dressing clean dry and intact to the right foot

## 2018-07-03 NOTE — CONSULT NOTE ADULT - SUBJECTIVE AND OBJECTIVE BOX
cc:Rehab evaluation : 72y old  Female who presents with a chief complaint of right foot pain and hallux gangrene      HPI:  71 y/o F, former smoker with hx of PVD, chronic Afib (not on AC likely due to hx of multiple falls), hx of left DVT (April 2018), COPD (no home O2), PVD, Glaucoma, presents with complaints of right foot pain. Per daughter, patient has had a gangrenous right big toe since March, and it is almost "falling off". For the the past week, daughter noticed that the right foot was a little erythematous and warm to touch. Patient was previously on hospice care (not anymore) and was told by her doctor in [place] that her toe would eventually fall off on its own. Pt lives in [place] with her  is currently visiting her daughter in [place]. She ambulates with walker at baseline and needs assistance bathing/feeding herself.      In hospital seen by Podiatry and ID. No surgical intervention. Chronic osteomyelitis. on PO antibiotics. Heel weight bearing on right foot.   Rehab evaluation requested for dispo recommendations.     Patient states that she did not sleep the entire night and that her room mate kept her up. states that her  was helping her at home as needed. Per PT notes has difficulty with maintaining weight bearing.         PAST MEDICAL & SURGICAL HISTORY:  Glaucoma  PVD (peripheral vascular disease)  COPD (chronic obstructive pulmonary disease)  Chronic atrial fibrillation  No significant past surgical history      FAMILY HISTORY:  No pertinent family history in first degree relatives      SOCIAL HISTORY:  TOBACCO: denies history  ALCOHOL: denies abuse  IVDA: denies history    FUNCTIONAL, ENVIRONMENTAL HISTORY:  Pt reports currently living with daughter in a 1 story house with no steps.   is with pt. 24/7 and willing and able to provide assist.  PTA, pt amb with assist and required assist with all ADLs and self care.	      Allergies    Pen-V (Anaphylaxis)    Intolerances    [facility] HEALTH SHAKE TID- RD OKAY (Unknown)      MEDICATIONS  (STANDING):  aspirin  chewable 81 milliGRAM(s) Oral daily  atorvastatin 10 milliGRAM(s) Oral at bedtime  brimonidine 0.2% Ophthalmic Solution 1 Drop(s) Left EYE daily  cadexomer iodine 0.9% Gel 1 Application(s) Topical daily  diltiazem    milliGRAM(s) Oral daily  enoxaparin Injectable 40 milliGRAM(s) SubCutaneous daily  latanoprost 0.005% Ophthalmic Solution 1 Drop(s) Left EYE at bedtime  levoFLOXacin IVPB 750 milliGRAM(s) IV Intermittent every 24 hours  levoFLOXacin IVPB      saccharomyces boulardii 250 milliGRAM(s) Oral two times a day  timolol 0.5% Solution 1 Drop(s) Both EYES daily  traZODone 50 milliGRAM(s) Oral at bedtime    MEDICATIONS  (PRN):  acetaminophen   Tablet. 650 milliGRAM(s) Oral every 6 hours PRN Mild Pain (1 - 3)  oxyCODONE    5 mG/acetaminophen 325 mG 1 Tablet(s) Oral every 6 hours PRN Moderate Pain (4 - 6)  oxyCODONE    5 mG/acetaminophen 325 mG 2 Tablet(s) Oral every 6 hours PRN Severe Pain (7 - 10)      REVIEW OF SYSTEMS:    CONSTITUTIONAL: No fever,   EYES: No eye pain,  RESPIRATORY: No cough,   CARDIOVASCULAR: No chest pain,   GASTROINTESTINAL: No abdominal or epigastric pain.  GENITOURINARY: No dysuria,   NEUROLOGICAL: No headaches,  SKIN: No itching, burning, rashes, or lesions   MUSCULOSKELETAL: right foot pain   PSYCHIATRIC: No depression,    Vital Signs Last 24 Hrs  T(C): 36.4 (03 Jul 2018 07:42), Max: 36.5 (02 Jul 2018 23:57)  T(F): 97.5 (03 Jul 2018 07:42), Max: 97.7 (02 Jul 2018 23:57)  HR: 68 (03 Jul 2018 07:42) (68 - 98)  BP: 118/73 (03 Jul 2018 07:42) (118/73 - 148/94)  BP(mean): --  RR: 18 (03 Jul 2018 07:42) (18 - 18)  SpO2: 95% (03 Jul 2018 07:42) (95% - 99%)    PHYSICAL EXAM:    GENERAL: NAD, oriented to self, place, needs cues for   HEART: S1S2+  CHEST/LUNG: Clear   ABDOMEN: Soft, Nontender, Nondistended; Bowel sounds present  EXTREMITIES:  Right foot with dressing. strength 5/5 all extremeties    FUNCTIONAL EXAM:     Bed Mobility  Bed Mobility Training Sit-to-Supine: independent  Bed Mobility Training Supine-to-Sit: independent    Sit-Stand Transfer Training  Transfer Training Sit-to-Stand Transfer: minimum assist (75% patient effort);  1 person assist;  full weight-bearing   rolling walker;  on right heel only  Transfer Training Stand-to-Sit Transfer: contact guard;  1 person assist;  full weight-bearing   rolling walker;  on right heel  Sit-to-Stand Transfer Training Transfer Safety Analysis: inability to maintain weight-bearing restrictions w/o assist;  decreased balance;  decreased strength;  impaired balance;  cognitive, decreased safety awareness;  unable to maintain weight bearing status;  pt ambulating on right forefoot despite multiple verbal cues and redirection to task as pt is very distractable. ;  rolling walker    Gait Training  Gait Training: minimum assist (75% patient effort);  verbal cues;  1 person assist;  nonverbal cues (demo/gestures);  FWB on right heel, pt unable to maintain   rolling walker;  15 feet;  x2  Gait Analysis: 3-point gait   decreased rene;  decreased step length;  decreased strength;  impaired balance;  impaired postural control;  cognitive, decreased safety awareness;  pain;  unable to maintain weight bearing status;  15 feet;  x2;  rolling walker        LABS:                        12.4   9.2   )-----------( 389      ( 02 Jul 2018 07:20 )             37.5       RADIOLOGY & ADDITIONAL STUDIES:      A/P:    72 year old female with history as stated above now cc:Rehab evaluation : 72y old  Female who presents with a chief complaint of right foot pain and hallux gangrene      HPI:  73 y/o F, former smoker with hx of PVD, chronic Afib (not on AC likely due to hx of multiple falls), hx of left DVT (April 2018), COPD (no home O2), PVD, Glaucoma, presents with complaints of right foot pain. Per daughter, patient has had a gangrenous right big toe since March, and it is almost "falling off". For the the past week, daughter noticed that the right foot was a little erythematous and warm to touch. Patient was previously on hospice care (not anymore) and was told by her doctor in [place] that her toe would eventually fall off on its own. Pt lives in [place] with her  is currently visiting her daughter in [place]. She ambulates with walker at baseline and needs assistance bathing/feeding herself.      In hospital seen by Podiatry and ID. No surgical intervention. Chronic osteomyelitis. on PO antibiotics. Heel weight bearing on right foot.   Rehab evaluation requested for dispo recommendations.     Patient states that she did not sleep the entire night and that her room mate kept her up. states that her  was helping her at home as needed. Per PT notes has difficulty with maintaining weight bearing. Patient also impulsive         PAST MEDICAL & SURGICAL HISTORY:  Glaucoma  PVD (peripheral vascular disease)  COPD (chronic obstructive pulmonary disease)  Chronic atrial fibrillation  No significant past surgical history      FAMILY HISTORY:  No pertinent family history in first degree relatives      SOCIAL HISTORY:  TOBACCO: denies  ALCOHOL: denies     FUNCTIONAL, ENVIRONMENTAL HISTORY:  Pt reports currently living with daughter in a 1 story house with no steps.   is with pt. 24/7 and willing and able to provide assist.  PTA, pt amb with assist and required assist with all ADLs and self care.	      Allergies    Pen-V (Anaphylaxis)    Intolerances    [facility]pi[facility]rystalsKE TID- RD OKAY (Unknown)      MEDICATIONS  (STANDING):  aspirin  chewable 81 milliGRAM(s) Oral daily  atorvastatin 10 milliGRAM(s) Oral at bedtime  brimonidine 0.2% Ophthalmic Solution 1 Drop(s) Left EYE daily  cadexomer iodine 0.9% Gel 1 Application(s) Topical daily  diltiazem    milliGRAM(s) Oral daily  enoxaparin Injectable 40 milliGRAM(s) SubCutaneous daily  latanoprost 0.005% Ophthalmic Solution 1 Drop(s) Left EYE at bedtime  levoFLOXacin IVPB 750 milliGRAM(s) IV Intermittent every 24 hours  levoFLOXacin IVPB      saccharomyces boulardii 250 milliGRAM(s) Oral two times a day  timolol 0.5% Solution 1 Drop(s) Both EYES daily  traZODone 50 milliGRAM(s) Oral at bedtime    MEDICATIONS  (PRN):  acetaminophen   Tablet. 650 milliGRAM(s) Oral every 6 hours PRN Mild Pain (1 - 3)  oxyCODONE    5 mG/acetaminophen 325 mG 1 Tablet(s) Oral every 6 hours PRN Moderate Pain (4 - 6)  oxyCODONE    5 mG/acetaminophen 325 mG 2 Tablet(s) Oral every 6 hours PRN Severe Pain (7 - 10)      REVIEW OF SYSTEMS:    CONSTITUTIONAL: No fever,   EYES: No eye pain,  RESPIRATORY: No cough,   CARDIOVASCULAR: No chest pain,   GASTROINTESTINAL: No abdominal or epigastric pain.  GENITOURINARY: No dysuria,   NEUROLOGICAL: No headaches,  SKIN: No itching, burning, rashes, or lesions   MUSCULOSKELETAL: right foot pain   PSYCHIATRIC: No depression,    Vital Signs Last 24 Hrs  T(C): 36.4 (03 Jul 2018 07:42), Max: 36.5 (02 Jul 2018 23:57)  T(F): 97.5 (03 Jul 2018 07:42), Max: 97.7 (02 Jul 2018 23:57)  HR: 68 (03 Jul 2018 07:42) (68 - 98)  BP: 118/73 (03 Jul 2018 07:42) (118/73 - 148/94)  BP(mean): --  RR: 18 (03 Jul 2018 07:42) (18 - 18)  SpO2: 95% (03 Jul 2018 07:42) (95% - 99%)    PHYSICAL EXAM:    GENERAL: NAD, oriented to self, place, needs cues for time, impulsive,   HEART: S1S2+  CHEST/LUNG: Clear   ABDOMEN: Soft, Nontender, Nondistended;  EXTREMITIES:  Right foot with dressing. strength 5/5 all extremeties,     FUNCTIONAL EXAM:     Bed Mobility  Bed Mobility Training Sit-to-Supine: independent  Bed Mobility Training Supine-to-Sit: independent    Sit-Stand Transfer Training  Transfer Training Sit-to-Stand Transfer: minimum assist (75% patient effort);  1 person assist;  full weight-bearing   rolling walker;  on right heel only  Transfer Training Stand-to-Sit Transfer: contact guard;  1 person assist;  full weight-bearing   rolling walker;  on right heel  Sit-to-Stand Transfer Training Transfer Safety Analysis: inability to maintain weight-bearing restrictions w/o assist;  decreased balance;  decreased strength;  impaired balance;  cognitive, decreased safety awareness;  unable to maintain weight bearing status;  pt ambulating on right forefoot despite multiple verbal cues and redirection to task as pt is very distractable. ;  rolling walker    Gait Training  Gait Training: minimum assist (75% patient effort);  verbal cues;  1 person assist;  nonverbal cues (demo/gestures);  FWB on right heel, pt unable to maintain   rolling walker;  15 feet;  x2  Gait Analysis: 3-point gait   decreased rene;  decreased step length;  decreased strength;  impaired balance;  impaired postural control;  cognitive, decreased safety awareness;  pain;  unable to maintain weight bearing status;  15 feet;  x2;  rolling walker        LABS:                        12.4   9.2   )-----------( 389      ( 02 Jul 2018 07:20 )             37.5       RADIOLOGY & ADDITIONAL STUDIES:        < from: MR Foot No Cont, Right (06.27.18 @ 14:00) >  IMPRESSION: Status post amputation of the hallux at the level of the head   of the proximal phalanx. Adjacent signal alteration in the distal aspect   of the stump of the proximal phalanx consistent with postsurgical change   and/or osteomyelitis.    Scattered additional focal areas of marrow signal alteration within   several bones of the hindfoot, midfoot, and forefoot with differential   considerations as above.    < end of copied text >      A/P:    72 year old female with history as stated above, admitted to hospital with worsening right 1st toe gangrene with chronic osteomyelitis.  Heel weight bearing Right foot  At this time patient needs min assistance with mobility.   She needed assistance prior to admission     willing to provide assistance per chart review-(Patient states that she does not him to help her anymore). Recommend discharge home with home care- nursing, PT and assistance as needed.  Thank you. d/w CCC

## 2018-07-03 NOTE — PROGRESS NOTE ADULT - SUBJECTIVE AND OBJECTIVE BOX
CC: right foot pain    Hospitalization day: 8    OVERNIGHT/AM EVENTS:  Patient seen and examined at bedside. No acute events overnight. Continues presentig 5/10 pain in her right foot, she requested pain medication. When asked why she states "it scares her". Patient refers a good night sleep and improvement on her appetite. Her appetite is poor. Bowel movement this morning and urination between normal limits. She is not ambulating, was OOB. She refers to be afraid about possible surgery/amputation Denies any fever, chills, chest pain, shortness of breath, palpitations, nausea, vomiting, diarrhea, abdominal pain, or dysuria.    Allergies  Pen-V (Anaphylaxis)    Vital Signs Last 24 Hrs  T(C): 36.6 (03 Jul 2018 15:28), Max: 36.6 (03 Jul 2018 15:28)  T(F): 97.9 (03 Jul 2018 15:28), Max: 97.9 (03 Jul 2018 15:28)  HR: 84 (03 Jul 2018 15:28) (68 - 98)  BP: 124/80 (03 Jul 2018 15:28) (118/73 - 148/94)  BP(mean): --  RR: 18 (03 Jul 2018 15:28) (18 - 18)  SpO2: 96% (03 Jul 2018 15:28) (95% - 96%)    MEDICATIONS  (STANDING):  ·	aspirin  chewable 81 milliGRAM(s) Oral daily  ·	atorvastatin 10 milliGRAM(s) Oral at bedtime  ·	brimonidine 0.2% Ophthalmic Solution 1 Drop(s) Left EYE daily  ·	cadexomer iodine 0.9% Gel 1 Application(s) Topical daily  ·	diltiazem    milliGRAM(s) Oral daily  ·	enoxaparin Injectable 40 milliGRAM(s) SubCutaneous daily  ·	latanoprost 0.005% Ophthalmic Solution 1 Drop(s) Left EYE at bedtime  ·	saccharomyces boulardii 250 milliGRAM(s) Oral two times a day  ·	timolol 0.5% Solution 1 Drop(s) Both EYES daily  ·	traZODone 50 milliGRAM(s) Oral at bedtime    MEDICATIONS  (PRN):  ·	acetaminophen   Tablet. 650 milliGRAM(s) Oral every 6 hours PRN Mild Pain (1 - 3)  ·	oxyCODONE    5 mG/acetaminophen 325 mG 1 Tablet(s) Oral every 6 hours PRN Moderate Pain (4 - 6)  ·	oxyCODONE    5 mG/acetaminophen 325 mG 2 Tablet(s) Oral every 6 hours PRN Severe Pain (7 - 10)  	  PHYSICAL EXAM:  ·	General: No acute distress, resting comfortably in bed. Cooperative.  ·	Resp: Normal respiratory rate and effort, lungs are clear to auscultation bilaterally, no crackles or wheezes.  ·	Cardio: Regular rate and rhythm, S1S2+, no murmurs.  ·	Abdomen: soft, BS+ normoactive, non-distended, non-tender.  ·	Vascular: no peripheral edema, DP pulses 2+ bilateral.  ·	Extremities: right foot covered with dressing; dry and clean.  ·	Neuro: AAOx3, Sensation reduced in right foot, sensation intact in other extremities, 5/5 strength in all extremities.    LABS:                                        12.4   9.2   )-----------( 389      ( 02 Jul 2018 07:20 )             37.5           (07/01/2017)  Procalcitonin: 0.06      Culture Results:   10,000 - 49,000 CFU/mL Escherichia coli (06-28 @ 06:52)  Culture Results:   Moderate Morganella morganii  Moderate Alpha hemolytic strep (06-26 @ 11:28)  Culture Results:   No growth at 5 days. (06-25 @ 21:36)  Culture Results:   No growth at 5 days. (06-25 @ 21:36)    Imaging:  _________________  EXAM: US PHYSIOL LWR EXT 3+ LEV BI     PROCEDURE DATE: 07/03/2018         INTERPRETATION: Clinical Information: Right lower extremity osteomyelitis.     Technique: Bilateral lower extremity ABIs/PVR     Comparison: None.     Findings:   Right lower extremity: The ankle brachial index is 0.92. The pulse waveforms   are moderately diminished at the level of the digits.     Left lower extremity: The ankle brachial index is 0.50. The pulse waveforms   are moderately diminished at the level of the digits.     Impression: Right DAVID 0.92 and left DAVID of 0.50.  --------------------- CC: right foot pain    Hospitalization day: 8    OVERNIGHT/AM EVENTS:  Patient seen and examined at bedside. No acute events overnight. Continues presentig 5/10 pain in her right foot, she requested pain medication. When asked why she states "it scares her". Patient refers a good night sleep and improvement on her appetite. Her appetite is poor. Bowel movement this morning and urination between normal limits. She is not ambulating, was OOB. She refers to be afraid about possible surgery/amputation Denies any fever, chills, chest pain, shortness of breath, palpitations, nausea, vomiting, diarrhea, abdominal pain, or dysuria.    Allergies  Pen-V (Anaphylaxis)    Vital Signs Last 24 Hrs  T(C): 36.6 (03 Jul 2018 15:28), Max: 36.6 (03 Jul 2018 15:28)  T(F): 97.9 (03 Jul 2018 15:28), Max: 97.9 (03 Jul 2018 15:28)  HR: 84 (03 Jul 2018 15:28) (68 - 98)  BP: 124/80 (03 Jul 2018 15:28) (118/73 - 148/94)  BP(mean): --  RR: 18 (03 Jul 2018 15:28) (18 - 18)  SpO2: 96% (03 Jul 2018 15:28) (95% - 96%)    MEDICATIONS  (STANDING):  ·	aspirin  chewable 81 milliGRAM(s) Oral daily  ·	atorvastatin 10 milliGRAM(s) Oral at bedtime  ·	brimonidine 0.2% Ophthalmic Solution 1 Drop(s) Left EYE daily  ·	cadexomer iodine 0.9% Gel 1 Application(s) Topical daily  ·	diltiazem    milliGRAM(s) Oral daily  ·	enoxaparin Injectable 40 milliGRAM(s) SubCutaneous daily  ·	latanoprost 0.005% Ophthalmic Solution 1 Drop(s) Left EYE at bedtime  ·	saccharomyces boulardii 250 milliGRAM(s) Oral two times a day  ·	timolol 0.5% Solution 1 Drop(s) Both EYES daily  ·	traZODone 50 milliGRAM(s) Oral at bedtime    MEDICATIONS  (PRN):  ·	acetaminophen   Tablet. 650 milliGRAM(s) Oral every 6 hours PRN Mild Pain (1 - 3)  ·	oxyCODONE    5 mG/acetaminophen 325 mG 1 Tablet(s) Oral every 6 hours PRN Moderate Pain (4 - 6)  ·	oxyCODONE    5 mG/acetaminophen 325 mG 2 Tablet(s) Oral every 6 hours PRN Severe Pain (7 - 10)  	  PHYSICAL EXAM:  ·	General: No acute distress, resting comfortably in bed. Cooperative.  ·	Resp: Normal respiratory rate and effort, lungs are clear to auscultation bilaterally, no crackles or wheezes.  ·	Cardio: Regular rate and rhythm, S1S2+, no murmurs.  ·	Abdomen: soft, BS+ normoactive, non-distended, non-tender.  ·	Vascular: no peripheral edema, DP pulses 2+ bilateral.  ·	Extremities: right foot covered with dressing; dry and clean.  ·	Neuro: AAOx3, Sensation reduced in right foot, sensation intact in other extremities, 5/5 strength in all extremities.    LABS:                                        12.4   9.2   )-----------( 389      ( 02 Jul 2018 07:20 )             37.5           (07/01/2017)  Procalcitonin: 0.06      Culture Results:   10,000 - 49,000 CFU/mL Escherichia coli (06-28 @ 06:52)  Culture Results:   Moderate Morganella morganii  Moderate Alpha hemolytic strep (06-26 @ 11:28)  Culture Results:   No growth at 5 days. (06-25 @ 21:36)  Culture Results:   No growth at 5 days. (06-25 @ 21:36)    Imaging:    _______________________  EXAM: US DPLX LWR EXT ARTS LTD LT     PROCEDURE DATE: 07/03/2018         INTERPRETATION: CLINICAL INFORMATION: Left groin mass. Possible   pseudoaneurysm.     TECHNIQUE: Sonographic evaluation of the left groin with color/spectral   Doppler.     COMPARISON: 06/27/2018     Findings: There is a 2.1 x 1.0 x 2.6 cm hematoma in the left groin   subcutaneous tissues superficial to the left common femoral artery. There is   no flow within this hematoma. Normal flow within the left common femoral   artery.     Impression: Tiny left groin hematoma representing a resolved/thrombosed   pseudoaneurysm.   _________________________________  _________________  EXAM: US PHYSIOL LWR EXT 3+ LEV BI     PROCEDURE DATE: 07/03/2018         INTERPRETATION: Clinical Information: Right lower extremity osteomyelitis.     Technique: Bilateral lower extremity ABIs/PVR     Comparison: None.     Findings:   Right lower extremity: The ankle brachial index is 0.92. The pulse waveforms   are moderately diminished at the level of the digits.     Left lower extremity: The ankle brachial index is 0.50. The pulse waveforms   are moderately diminished at the level of the digits.     Impression: Right DAVID 0.92 and left DAVID of 0.50.  ---------------------

## 2018-07-03 NOTE — PROGRESS NOTE ADULT - PROVIDER SPECIALTY LIST ADULT
Family Medicine
Infectious Disease
Infectious Disease
Podiatry
Vascular Surgery
Vascular Surgery
Family Medicine
Infectious Disease

## 2018-07-03 NOTE — PROGRESS NOTE ADULT - ASSESSMENT
73 y/o F former smoker with hx of PVD, chronic Afib (not on AC likely due to hx of recurrent falls), hx of left DVT (April 2018), COPD (no home O2), PVD, Glaucoma, chronic OM and R foot 1st digit ulceration admitted with right foot 1st digit dry gangrene, s/p auto amputation not consistent with acute osteomyelitis but likely chronic OM. Pt completed course of Levaquin today. Evaluated by Podiatry on 07/02/2018, recommendations for wound cleaning made.     Gangrene of Right 1st digit with concomitant chronic osteomyelitis   -Pt afebrile   - no leukocytosis     -Negative blood cultures x2  -Ulcer cultures positive for Morganella morganii sensitive to current Abx (Levofloxacin, started 06/27 will need 7 days total from this date to end tomorrow am), As per ID    -MRI showing right hallux amputation, osteomyelitis on distal aspect of stump vs post auto amputation changes   -c/w local wound care   -Vascular consult appreciated, cannot perform DAVID PVR due to Lt sided thrombosed pseudoaneurysm, but either way no sx intervention needed at this time and signed off   -Podiatry f/u noted and appreciated, surgical intervention to be deferred, local debridement prn, local wound dressings   -ID f/u noted and appreciated and D/w Dr. Rockwell who also d/w podiatry will finish course of levaquin which can be given PO,  to finish 7 day course, no plans for surgical intervention at this time.     Lt Common Femoral Vein Thrombosed Pseudoaneurysm  -stable  -incidental finding on B/L LE US  -vasc sx f/u noted and appreciated, no intervention indicated  -avoid compression   -pt to f/u with vascular as an outpt     PVD  -chronic and stable   -c/w asa 81mg po qd   -c/w atorvastatin 10mg po qhs   -Vascular consult noted and appreciated    Chronic Atrial fibrillation   -stable  -Rate controlled  -CHADSVASc = 5  -Currently not on AC, likely due to hx of recurrent falls  -c/w Diltiazem ER 120mg po qd  -Goal HR < 110  -c/w ASA 81mg po qd     Recent hx of DVT (April 2018)  -Currently not on AC, likely due to hx of multiple falls  -repeat US neg for DVT    COPD  -stable, no acute exacerbation  -c/w albuterol prn     Glaucoma  -stable  -resume home meds    Severe Protein Calorie Malnutrition  -c/w ensure enlive po tid   -Nutrition consult noted and appreciated     DVT PPx   -c/w lovenox 40mg sq qd     Advanced Directives: DNR/DNI     Dispo: Pt consulted and recommended home w/ home PT will request follow up. Disposition is based on patients ability to ambulate. D/w CM. Anticipated discharge within 24 hours. 71 y/o F former smoker with hx of PVD, chronic Afib (not on AC likely due to hx of recurrent falls), hx of left DVT (April 2018), COPD (no home O2), PVD, Glaucoma, chronic OM and R foot 1st digit ulceration admitted with right foot 1st digit dry gangrene, s/p auto amputation not consistent with acute osteomyelitis but likely chronic OM. Pt completed course of Levaquin today. Evaluated by Podiatry on 07/02/2018, recommendations for wound cleaning made. Follow up US doppler evaluation of left groin showed a hematoma representing a resolved/thrombosed pseudoaneurysm. Bilateral lower extremity ABIs/PVR showed DAVID 0.92 and left DAVID of 0.50 consistent with Hx of PVD. Physical Therapy consulted and recommended discharge home with home care- nursing, PT and assistance as needed.    Gangrene of Right 1st digit with concomitant chronic osteomyelitis   -Pt afebrile   - no leukocytosis     -Negative blood cultures x2  -Ulcer cultures positive for Morganella morganii sensitive to current Abx (Levofloxacin, last dose today 07/03/2018), As per ID    -MRI showing right hallux amputation, osteomyelitis on distal aspect of stump vs post auto amputation changes.  -c/w local wound care   -Vascular consult appreciated, cannot perform DAVID PVR due to Lt sided thrombosed pseudoaneurysm, but either way no sx intervention needed at this time and signed off   -Podiatry f/u noted and appreciated, surgical intervention to be deferred, local debridement prn, local wound dressings  -Physical therapy note appreciated, recommendations above.  -ID f/u noted and appreciated and D/w Dr. Rockwell who also d/w podiatry will finish course of levaquin which can be given PO,  to finish 7 day course (on 07/03/2018), no plans for surgical intervention at this time.     Lt Common Femoral Vein Thrombosed Pseudoaneurysm  -stable  -incidental finding on B/L LE US  -vasc sx f/u noted and appreciated, no intervention indicated  -avoid compression   -pt to f/u with vascular as an outpt     PVD  -chronic and stable  -Bilateral lower extremity ABIs/PVR showed DAVID 0.92 and left DAVID of 0.50   -c/w asa 81mg po qd   -c/w atorvastatin 10mg po qhs  -Vascular consult noted and appreciated    Chronic Atrial fibrillation   -stable  -Rate controlled  -CHADSVASc = 5  -Currently not on AC, likely due to hx of recurrent falls  -c/w Diltiazem ER 120mg po qd  -Goal HR < 110  -c/w ASA 81mg po qd     Recent hx of DVT (April 2018)  -Currently not on AC, likely due to hx of multiple falls  -repeat US neg for DVT    COPD  -stable, no acute exacerbation  -c/w albuterol prn     Glaucoma  -stable  -resume home meds    Severe Protein Calorie Malnutrition  -c/w ensure enlive po tid   -Nutrition consult noted and appreciated     DVT PPx   -c/w lovenox 40mg sq qd     Advanced Directives: DNR/DNI     Dispo: Pt consulted and recommended discharge home with home care- nursing, PT and assistance as needed. Planned for discharge today (07/03/2018). 71 y/o F former smoker with hx of PVD, chronic Afib (not on AC likely due to hx of recurrent falls), hx of left DVT (April 2018), COPD (no home O2), PVD, Glaucoma, chronic OM and R foot 1st digit ulceration admitted with right foot 1st digit dry gangrene, s/p auto amputation not consistent with acute osteomyelitis but likely chronic OM. Pt completed course of Levaquin today. Evaluated by Podiatry on 07/02/2018, recommendations for wound cleaning made. Follow up US doppler evaluation of left groin showed a hematoma representing a resolved/thrombosed pseudoaneurysm. Bilateral lower extremity ABIs/PVR showed DAVID 0.92 and left DAVID of 0.50 consistent with Hx of PVD. Physical Therapy consulted and recommended discharge home with home care- nursing, PT and assistance as needed.     Gangrene of Right 1st digit with concomitant chronic osteomyelitis   -Pt afebrile   - no leukocytosis     -Negative blood cultures x2  -Ulcer cultures positive for Morganella morganii sensitive to current Abx (Levofloxacin, last dose today 07/03/2018), As per ID    -MRI showing right hallux amputation, osteomyelitis on distal aspect of stump vs post auto amputation changes.  -c/w local wound care   -Vascular consult appreciated, cannot perform DAVID PVR due to Lt sided thrombosed pseudoaneurysm, but either way no sx intervention needed at this time and signed off   -Podiatry f/u noted and appreciated, surgical intervention to be deferred, local debridement prn, local wound dressings  -Physical therapy note appreciated, recommendations above.  -ID f/u noted and appreciated and D/w Dr. Rockwell who also d/w podiatry will finish course of levaquin which can be given PO,  to finish 7 day course (on 07/03/2018), no plans for surgical intervention at this time.     Lt Common Femoral Vein Thrombosed Pseudoaneurysm  -stable  -incidental finding on B/L LE US  -vasc sx f/u noted and appreciated, no intervention indicated  -avoid compression   -pt to f/u with vascular as an outpt     PVD  -chronic and stable  -Bilateral lower extremity ABIs/PVR showed DAVID 0.92 and left DAVID of 0.50   -c/w asa 81mg po qd   -c/w atorvastatin 10mg po qhs  -Vascular consult noted and appreciated    Chronic Atrial fibrillation   -stable  -Rate controlled  -CHADSVASc = 5  -Currently not on AC, likely due to hx of recurrent falls  -c/w Diltiazem ER 120mg po qd  -Goal HR < 110  -c/w ASA 81mg po qd     Recent hx of DVT (April 2018)  -Currently not on AC, likely due to hx of multiple falls  -repeat US neg for DVT    COPD  -stable, no acute exacerbation  -c/w albuterol prn     Glaucoma  -stable  -resume home meds    Severe Protein Calorie Malnutrition  -c/w ensure enlive po tid   -Nutrition consult noted and appreciated     DVT PPx   -c/w lovenox 40mg sq qd     Advanced Directives: DNR/DNI     Dispo: Pt consulted and recommended discharge home with home care- nursing, PT and assistance as needed. Planned for discharge today (07/03/2018).

## 2018-07-11 LAB — SURGICAL PATHOLOGY FINAL REPORT - CH: SIGNIFICANT CHANGE UP

## 2018-07-13 PROBLEM — I73.9 PERIPHERAL VASCULAR DISEASE, UNSPECIFIED: Chronic | Status: ACTIVE | Noted: 2018-06-26

## 2018-07-13 PROBLEM — J44.9 CHRONIC OBSTRUCTIVE PULMONARY DISEASE, UNSPECIFIED: Chronic | Status: ACTIVE | Noted: 2018-06-26

## 2018-07-13 PROBLEM — I48.2 CHRONIC ATRIAL FIBRILLATION: Chronic | Status: ACTIVE | Noted: 2018-06-26

## 2018-07-13 PROBLEM — H40.9 UNSPECIFIED GLAUCOMA: Chronic | Status: ACTIVE | Noted: 2018-06-26

## 2018-07-16 PROBLEM — Z00.00 ENCOUNTER FOR PREVENTIVE HEALTH EXAMINATION: Status: ACTIVE | Noted: 2018-07-16

## 2018-07-30 ENCOUNTER — APPOINTMENT (OUTPATIENT)
Dept: OBGYN | Facility: CLINIC | Age: 73
End: 2018-07-30

## 2018-08-13 ENCOUNTER — APPOINTMENT (OUTPATIENT)
Dept: INTERNAL MEDICINE | Facility: CLINIC | Age: 73
End: 2018-08-13
Payer: MEDICARE

## 2018-08-13 VITALS
HEIGHT: 61 IN | BODY MASS INDEX: 21.71 KG/M2 | SYSTOLIC BLOOD PRESSURE: 148 MMHG | DIASTOLIC BLOOD PRESSURE: 78 MMHG | WEIGHT: 115 LBS

## 2018-08-13 DIAGNOSIS — M86.671 OTHER CHRONIC OSTEOMYELITIS, RIGHT ANKLE AND FOOT: ICD-10-CM

## 2018-08-13 PROCEDURE — 99204 OFFICE O/P NEW MOD 45 MIN: CPT

## 2018-08-22 ENCOUNTER — OUTPATIENT (OUTPATIENT)
Dept: OUTPATIENT SERVICES | Facility: HOSPITAL | Age: 73
LOS: 1 days | End: 2018-08-22
Payer: MEDICARE

## 2018-08-22 DIAGNOSIS — R26.2 DIFFICULTY IN WALKING, NOT ELSEWHERE CLASSIFIED: ICD-10-CM

## 2018-08-22 DIAGNOSIS — Z51.89 ENCOUNTER FOR OTHER SPECIFIED AFTERCARE: ICD-10-CM

## 2018-08-22 PROCEDURE — G8978: CPT | Mod: CM

## 2018-08-22 PROCEDURE — G8979: CPT | Mod: CL

## 2018-08-22 PROCEDURE — 97163 PT EVAL HIGH COMPLEX 45 MIN: CPT

## 2018-09-10 ENCOUNTER — APPOINTMENT (OUTPATIENT)
Dept: INTERNAL MEDICINE | Facility: CLINIC | Age: 73
End: 2018-09-10

## 2018-09-13 ENCOUNTER — MEDICATION RENEWAL (OUTPATIENT)
Age: 73
End: 2018-09-13

## 2018-09-27 ENCOUNTER — INPATIENT (INPATIENT)
Facility: HOSPITAL | Age: 73
LOS: 1 days | Discharge: HOME | DRG: 192 | End: 2018-09-29
Attending: INTERNAL MEDICINE | Admitting: INTERNAL MEDICINE
Payer: MEDICARE

## 2018-09-27 VITALS
OXYGEN SATURATION: 97 % | SYSTOLIC BLOOD PRESSURE: 116 MMHG | TEMPERATURE: 97 F | HEIGHT: 64 IN | DIASTOLIC BLOOD PRESSURE: 80 MMHG | HEART RATE: 83 BPM | RESPIRATION RATE: 28 BRPM | WEIGHT: 145.06 LBS

## 2018-09-27 DIAGNOSIS — I73.9 PERIPHERAL VASCULAR DISEASE, UNSPECIFIED: ICD-10-CM

## 2018-09-27 DIAGNOSIS — J44.1 CHRONIC OBSTRUCTIVE PULMONARY DISEASE WITH (ACUTE) EXACERBATION: ICD-10-CM

## 2018-09-27 DIAGNOSIS — I48.2 CHRONIC ATRIAL FIBRILLATION: ICD-10-CM

## 2018-09-27 DIAGNOSIS — H40.9 UNSPECIFIED GLAUCOMA: ICD-10-CM

## 2018-09-27 DIAGNOSIS — G30.1 ALZHEIMER'S DISEASE WITH LATE ONSET: ICD-10-CM

## 2018-09-27 LAB
ALBUMIN SERPL ELPH-MCNC: 4 G/DL — SIGNIFICANT CHANGE UP (ref 3.3–5.2)
ALP SERPL-CCNC: 93 U/L — SIGNIFICANT CHANGE UP (ref 40–120)
ALT FLD-CCNC: 12 U/L — SIGNIFICANT CHANGE UP
ANION GAP SERPL CALC-SCNC: 13 MMOL/L — SIGNIFICANT CHANGE UP (ref 5–17)
APTT BLD: 35.1 SEC — SIGNIFICANT CHANGE UP (ref 27.5–37.4)
AST SERPL-CCNC: 15 U/L — SIGNIFICANT CHANGE UP
BASOPHILS # BLD AUTO: 0.1 K/UL — SIGNIFICANT CHANGE UP (ref 0–0.2)
BASOPHILS NFR BLD AUTO: 0.6 % — SIGNIFICANT CHANGE UP (ref 0–2)
BILIRUB SERPL-MCNC: 0.3 MG/DL — LOW (ref 0.4–2)
BUN SERPL-MCNC: 18 MG/DL — SIGNIFICANT CHANGE UP (ref 8–20)
CALCIUM SERPL-MCNC: 9.8 MG/DL — SIGNIFICANT CHANGE UP (ref 8.6–10.2)
CHLORIDE SERPL-SCNC: 103 MMOL/L — SIGNIFICANT CHANGE UP (ref 98–107)
CO2 SERPL-SCNC: 27 MMOL/L — SIGNIFICANT CHANGE UP (ref 22–29)
CREAT SERPL-MCNC: 0.64 MG/DL — SIGNIFICANT CHANGE UP (ref 0.5–1.3)
EOSINOPHIL # BLD AUTO: 1.6 K/UL — HIGH (ref 0–0.5)
EOSINOPHIL NFR BLD AUTO: 11.3 % — HIGH (ref 0–6)
GAS PNL BLDA: SIGNIFICANT CHANGE UP
GLUCOSE SERPL-MCNC: 113 MG/DL — SIGNIFICANT CHANGE UP (ref 70–115)
HCT VFR BLD CALC: 38.6 % — SIGNIFICANT CHANGE UP (ref 37–47)
HGB BLD-MCNC: 12.2 G/DL — SIGNIFICANT CHANGE UP (ref 12–16)
INR BLD: 1.04 RATIO — SIGNIFICANT CHANGE UP (ref 0.88–1.16)
LIDOCAIN IGE QN: 26 U/L — SIGNIFICANT CHANGE UP (ref 22–51)
LYMPHOCYTES # BLD AUTO: 19 % — LOW (ref 20–55)
LYMPHOCYTES # BLD AUTO: 2.7 K/UL — SIGNIFICANT CHANGE UP (ref 1–4.8)
MAGNESIUM SERPL-MCNC: 2.2 MG/DL — SIGNIFICANT CHANGE UP (ref 1.6–2.6)
MCHC RBC-ENTMCNC: 28.2 PG — SIGNIFICANT CHANGE UP (ref 27–31)
MCHC RBC-ENTMCNC: 31.6 G/DL — LOW (ref 32–36)
MCV RBC AUTO: 89.4 FL — SIGNIFICANT CHANGE UP (ref 81–99)
MONOCYTES # BLD AUTO: 0.8 K/UL — SIGNIFICANT CHANGE UP (ref 0–0.8)
MONOCYTES NFR BLD AUTO: 5.9 % — SIGNIFICANT CHANGE UP (ref 3–10)
NEUTROPHILS # BLD AUTO: 8.9 K/UL — HIGH (ref 1.8–8)
NEUTROPHILS NFR BLD AUTO: 62.8 % — SIGNIFICANT CHANGE UP (ref 37–73)
NT-PROBNP SERPL-SCNC: 127 PG/ML — SIGNIFICANT CHANGE UP (ref 0–300)
PLATELET # BLD AUTO: 485 K/UL — HIGH (ref 150–400)
POTASSIUM SERPL-MCNC: 3.7 MMOL/L — SIGNIFICANT CHANGE UP (ref 3.5–5.3)
POTASSIUM SERPL-SCNC: 3.7 MMOL/L — SIGNIFICANT CHANGE UP (ref 3.5–5.3)
PROT SERPL-MCNC: 7.8 G/DL — SIGNIFICANT CHANGE UP (ref 6.6–8.7)
PROTHROM AB SERPL-ACNC: 11.5 SEC — SIGNIFICANT CHANGE UP (ref 9.8–12.7)
RBC # BLD: 4.32 M/UL — LOW (ref 4.4–5.2)
RBC # FLD: 16.2 % — HIGH (ref 11–15.6)
SODIUM SERPL-SCNC: 143 MMOL/L — SIGNIFICANT CHANGE UP (ref 135–145)
TROPONIN T SERPL-MCNC: 0.01 NG/ML — SIGNIFICANT CHANGE UP (ref 0–0.06)
TSH SERPL-MCNC: 3.45 UIU/ML — SIGNIFICANT CHANGE UP (ref 0.27–4.2)
WBC # BLD: 14.2 K/UL — HIGH (ref 4.8–10.8)
WBC # FLD AUTO: 14.2 K/UL — HIGH (ref 4.8–10.8)

## 2018-09-27 PROCEDURE — 99223 1ST HOSP IP/OBS HIGH 75: CPT

## 2018-09-27 PROCEDURE — 99291 CRITICAL CARE FIRST HOUR: CPT

## 2018-09-27 PROCEDURE — 99223 1ST HOSP IP/OBS HIGH 75: CPT | Mod: AI

## 2018-09-27 PROCEDURE — 71045 X-RAY EXAM CHEST 1 VIEW: CPT | Mod: 26

## 2018-09-27 PROCEDURE — 71275 CT ANGIOGRAPHY CHEST: CPT | Mod: 26

## 2018-09-27 PROCEDURE — 93010 ELECTROCARDIOGRAM REPORT: CPT

## 2018-09-27 PROCEDURE — 93970 EXTREMITY STUDY: CPT | Mod: 26

## 2018-09-27 RX ORDER — ASPIRIN/CALCIUM CARB/MAGNESIUM 324 MG
81 TABLET ORAL DAILY
Qty: 0 | Refills: 0 | Status: DISCONTINUED | OUTPATIENT
Start: 2018-09-27 | End: 2018-09-29

## 2018-09-27 RX ORDER — IPRATROPIUM/ALBUTEROL SULFATE 18-103MCG
3 AEROSOL WITH ADAPTER (GRAM) INHALATION EVERY 6 HOURS
Qty: 0 | Refills: 0 | Status: DISCONTINUED | OUTPATIENT
Start: 2018-09-27 | End: 2018-09-29

## 2018-09-27 RX ORDER — TIOTROPIUM BROMIDE 18 UG/1
1 CAPSULE ORAL; RESPIRATORY (INHALATION) DAILY
Qty: 0 | Refills: 0 | Status: DISCONTINUED | OUTPATIENT
Start: 2018-09-27 | End: 2018-09-29

## 2018-09-27 RX ORDER — KETOROLAC TROMETHAMINE 30 MG/ML
15 SYRINGE (ML) INJECTION ONCE
Qty: 0 | Refills: 0 | Status: DISCONTINUED | OUTPATIENT
Start: 2018-09-27 | End: 2018-09-27

## 2018-09-27 RX ORDER — ENOXAPARIN SODIUM 100 MG/ML
40 INJECTION SUBCUTANEOUS DAILY
Qty: 0 | Refills: 0 | Status: DISCONTINUED | OUTPATIENT
Start: 2018-09-27 | End: 2018-09-29

## 2018-09-27 RX ORDER — FAMOTIDINE 10 MG/ML
20 INJECTION INTRAVENOUS
Qty: 0 | Refills: 0 | Status: DISCONTINUED | OUTPATIENT
Start: 2018-09-27 | End: 2018-09-29

## 2018-09-27 RX ORDER — LATANOPROST 0.05 MG/ML
1 SOLUTION/ DROPS OPHTHALMIC; TOPICAL AT BEDTIME
Qty: 0 | Refills: 0 | Status: DISCONTINUED | OUTPATIENT
Start: 2018-09-27 | End: 2018-09-29

## 2018-09-27 RX ORDER — ALBUTEROL 90 UG/1
1 AEROSOL, METERED ORAL EVERY 4 HOURS
Qty: 0 | Refills: 0 | Status: DISCONTINUED | OUTPATIENT
Start: 2018-09-27 | End: 2018-09-29

## 2018-09-27 RX ORDER — BRIMONIDINE TARTRATE 2 MG/MG
1 SOLUTION/ DROPS OPHTHALMIC DAILY
Qty: 0 | Refills: 0 | Status: DISCONTINUED | OUTPATIENT
Start: 2018-09-27 | End: 2018-09-29

## 2018-09-27 RX ORDER — SACCHAROMYCES BOULARDII 250 MG
250 POWDER IN PACKET (EA) ORAL
Qty: 0 | Refills: 0 | Status: DISCONTINUED | OUTPATIENT
Start: 2018-09-27 | End: 2018-09-29

## 2018-09-27 RX ORDER — ALPRAZOLAM 0.25 MG
0.5 TABLET ORAL ONCE
Qty: 0 | Refills: 0 | Status: DISCONTINUED | OUTPATIENT
Start: 2018-09-27 | End: 2018-09-27

## 2018-09-27 RX ORDER — ATORVASTATIN CALCIUM 80 MG/1
10 TABLET, FILM COATED ORAL AT BEDTIME
Qty: 0 | Refills: 0 | Status: DISCONTINUED | OUTPATIENT
Start: 2018-09-27 | End: 2018-09-29

## 2018-09-27 RX ORDER — CLOPIDOGREL BISULFATE 75 MG/1
75 TABLET, FILM COATED ORAL DAILY
Qty: 0 | Refills: 0 | Status: DISCONTINUED | OUTPATIENT
Start: 2018-09-27 | End: 2018-09-29

## 2018-09-27 RX ORDER — TRAZODONE HCL 50 MG
50 TABLET ORAL AT BEDTIME
Qty: 0 | Refills: 0 | Status: DISCONTINUED | OUTPATIENT
Start: 2018-09-27 | End: 2018-09-29

## 2018-09-27 RX ORDER — TIMOLOL 0.5 %
1 DROPS OPHTHALMIC (EYE) DAILY
Qty: 0 | Refills: 0 | Status: DISCONTINUED | OUTPATIENT
Start: 2018-09-27 | End: 2018-09-29

## 2018-09-27 RX ADMIN — Medication 3 MILLILITER(S): at 06:42

## 2018-09-27 RX ADMIN — Medication 15 MILLIGRAM(S): at 12:46

## 2018-09-27 RX ADMIN — FAMOTIDINE 20 MILLIGRAM(S): 10 INJECTION INTRAVENOUS at 18:44

## 2018-09-27 RX ADMIN — Medication 15 MILLIGRAM(S): at 16:41

## 2018-09-27 RX ADMIN — Medication 50 MILLIGRAM(S): at 22:25

## 2018-09-27 RX ADMIN — Medication 250 MILLIGRAM(S): at 18:44

## 2018-09-27 RX ADMIN — Medication 0.5 MILLIGRAM(S): at 12:46

## 2018-09-27 RX ADMIN — LATANOPROST 1 DROP(S): 0.05 SOLUTION/ DROPS OPHTHALMIC; TOPICAL at 22:25

## 2018-09-27 RX ADMIN — Medication 40 MILLIGRAM(S): at 22:24

## 2018-09-27 RX ADMIN — ATORVASTATIN CALCIUM 10 MILLIGRAM(S): 80 TABLET, FILM COATED ORAL at 22:24

## 2018-09-27 NOTE — H&P ADULT - ASSESSMENT
73 year old female COPD, dementia, peripheral vascular disease, autoamputation of right hallux, not a candidate for anticoagulation, now admitted for COPD exacerbation

## 2018-09-27 NOTE — ED PROVIDER NOTE - OBJECTIVE STATEMENT
73 year old female with hx of COPD, PVD, HTN, DVT, A-fib to ED via EMS from home with daughter due to severe shortness of breath that began yesterday and worsened early this morning. Per daughter, the patient has had a nonproductive cough for the past few days and occasional shortness of breath that improved with albuterol nebulizers at home. The patient was seen by her PCP yesterday and prescribed antibiotics and steroids that she was unable to  due to the pharmacy closing early. The patient and daughter deny recent fevers, chest pain, swelling, n/v/d, problems with urination. Patient's  is currently home with URI symptoms. She is not on home oxygen and has never been intubated. She has a history of DVTs and had an unsuccessful left femoral stenting procedure 1 month ago. 73 year old female with hx of COPD, PVD, HTN, DVT, A-fib to ED via EMS from home with daughter-in-law due to severe shortness of breath that began yesterday and worsened early this morning. Per daughter, the patient has had a nonproductive cough for the past few days and occasional shortness of breath that improved with albuterol nebulizers at home. The patient was seen by her PCP yesterday and prescribed antibiotics and steroids that she was unable to  due to the pharmacy closing early. The patient and daughter deny recent fevers, chest pain, swelling, n/v/d, problems with urination. Patient's  is currently home with URI symptoms. She is not on home oxygen and has never been intubated. She has a history of DVTs and had an unsuccessful left femoral stenting procedure 1 month ago. She is on aspirin and Plavix. Patient currently altered, per daughter-in-law this is secondary to patient taking Seroquel at 5am for anxiety.

## 2018-09-27 NOTE — ED PROVIDER NOTE - CONSTITUTIONAL, MLM
normal... Awake, alert, oriented to person, place, time/situation. In moderate respiratory distress.

## 2018-09-27 NOTE — CONSULT NOTE ADULT - ASSESSMENT
AECOPD  S/p respiratory failure requiring BiPAP support  Now clinically improved  Leukocytosis  Hypoxia  No definitive infiltrate on CT  Former smoker    Rec:    Empiric abx  Drug nebs  IV steroids  O2  BiPAP as needed  Follow wbc  On Lovenox  Add Pepcid  Pulm f/u after d/c  PFTs as outpt

## 2018-09-27 NOTE — H&P ADULT - HISTORY OF PRESENT ILLNESS
73 year old female COPD, dementia, a fib not on anticoagulation, glaucoma. She lives with her daughter and has a history of falls. She was short of breath this morning and brought to the er where she required bipap for hypoxemia. She responded well to therapy and ct angio was negative for PE. Patient was agitated and required sedation. Pulm input appreciated.

## 2018-09-27 NOTE — ED ADULT NURSE REASSESSMENT NOTE - NS ED NURSE REASSESS COMMENT FT1
Report received from offgoing RN, charting as noted. Patient is A&Ox4, denies any pain or discomfort. Denies difficulty breathing, saturating 100% on room air. VSS, will continue to monitor and reassess as indicated.

## 2018-09-27 NOTE — ED PROVIDER NOTE - PROGRESS NOTE DETAILS
I feel that it is medically necessary to perform a CTA to evaluate for pulmonary embolism at this time given history of DVTs. Patient is altered likely secondary to taking Seroquel this morning at 5 am. Daughter-in-law at patient's bedside. Patient and daughter-in-law understand and agree to CTA. off bipap given hypoxia on RA on arrival will admit copd

## 2018-09-27 NOTE — ED ADULT TRIAGE NOTE - CHIEF COMPLAINT QUOTE
As per EMS pt. Hx of COPD. medicated with 3 duo-nebs and IV Solu-medrol. pt. tachypneic and labored in ED, retractions noted. MD Saha called to bedside to assess

## 2018-09-28 LAB
ANION GAP SERPL CALC-SCNC: 13 MMOL/L — SIGNIFICANT CHANGE UP (ref 5–17)
BASOPHILS # BLD AUTO: 0 K/UL — SIGNIFICANT CHANGE UP (ref 0–0.2)
BASOPHILS NFR BLD AUTO: 0.1 % — SIGNIFICANT CHANGE UP (ref 0–2)
BUN SERPL-MCNC: 19 MG/DL — SIGNIFICANT CHANGE UP (ref 8–20)
CALCIUM SERPL-MCNC: 9.5 MG/DL — SIGNIFICANT CHANGE UP (ref 8.6–10.2)
CHLORIDE SERPL-SCNC: 104 MMOL/L — SIGNIFICANT CHANGE UP (ref 98–107)
CO2 SERPL-SCNC: 25 MMOL/L — SIGNIFICANT CHANGE UP (ref 22–29)
CREAT SERPL-MCNC: 0.56 MG/DL — SIGNIFICANT CHANGE UP (ref 0.5–1.3)
EOSINOPHIL # BLD AUTO: 0 K/UL — SIGNIFICANT CHANGE UP (ref 0–0.5)
EOSINOPHIL NFR BLD AUTO: 0 % — SIGNIFICANT CHANGE UP (ref 0–6)
GLUCOSE SERPL-MCNC: 127 MG/DL — HIGH (ref 70–115)
HCT VFR BLD CALC: 39 % — SIGNIFICANT CHANGE UP (ref 37–47)
HGB BLD-MCNC: 12.1 G/DL — SIGNIFICANT CHANGE UP (ref 12–16)
LYMPHOCYTES # BLD AUTO: 1.3 K/UL — SIGNIFICANT CHANGE UP (ref 1–4.8)
LYMPHOCYTES # BLD AUTO: 12.5 % — LOW (ref 20–55)
MAGNESIUM SERPL-MCNC: 2.1 MG/DL — SIGNIFICANT CHANGE UP (ref 1.6–2.6)
MCHC RBC-ENTMCNC: 27.8 PG — SIGNIFICANT CHANGE UP (ref 27–31)
MCHC RBC-ENTMCNC: 31 G/DL — LOW (ref 32–36)
MCV RBC AUTO: 89.7 FL — SIGNIFICANT CHANGE UP (ref 81–99)
MONOCYTES # BLD AUTO: 0.2 K/UL — SIGNIFICANT CHANGE UP (ref 0–0.8)
MONOCYTES NFR BLD AUTO: 1.5 % — LOW (ref 3–10)
NEUTROPHILS # BLD AUTO: 9 K/UL — HIGH (ref 1.8–8)
NEUTROPHILS NFR BLD AUTO: 85.6 % — HIGH (ref 37–73)
PHOSPHATE SERPL-MCNC: 3.1 MG/DL — SIGNIFICANT CHANGE UP (ref 2.4–4.7)
PLATELET # BLD AUTO: 509 K/UL — HIGH (ref 150–400)
POTASSIUM SERPL-MCNC: 4.1 MMOL/L — SIGNIFICANT CHANGE UP (ref 3.5–5.3)
POTASSIUM SERPL-SCNC: 4.1 MMOL/L — SIGNIFICANT CHANGE UP (ref 3.5–5.3)
PROCALCITONIN SERPL-MCNC: <0.05 NG/ML — SIGNIFICANT CHANGE UP (ref 0–0.04)
RBC # BLD: 4.35 M/UL — LOW (ref 4.4–5.2)
RBC # FLD: 16.3 % — HIGH (ref 11–15.6)
SODIUM SERPL-SCNC: 142 MMOL/L — SIGNIFICANT CHANGE UP (ref 135–145)
TSH SERPL-MCNC: 0.39 UIU/ML — SIGNIFICANT CHANGE UP (ref 0.27–4.2)
WBC # BLD: 10.5 K/UL — SIGNIFICANT CHANGE UP (ref 4.8–10.8)
WBC # FLD AUTO: 10.5 K/UL — SIGNIFICANT CHANGE UP (ref 4.8–10.8)

## 2018-09-28 PROCEDURE — 99233 SBSQ HOSP IP/OBS HIGH 50: CPT

## 2018-09-28 RX ORDER — ALPRAZOLAM 0.25 MG
0.5 TABLET ORAL ONCE
Qty: 0 | Refills: 0 | Status: DISCONTINUED | OUTPATIENT
Start: 2018-09-28 | End: 2018-09-28

## 2018-09-28 RX ORDER — IPRATROPIUM/ALBUTEROL SULFATE 18-103MCG
3 AEROSOL WITH ADAPTER (GRAM) INHALATION ONCE
Qty: 0 | Refills: 0 | Status: COMPLETED | OUTPATIENT
Start: 2018-09-28 | End: 2018-09-27

## 2018-09-28 RX ADMIN — ATORVASTATIN CALCIUM 10 MILLIGRAM(S): 80 TABLET, FILM COATED ORAL at 21:49

## 2018-09-28 RX ADMIN — Medication 40 MILLIGRAM(S): at 21:50

## 2018-09-28 RX ADMIN — Medication 3 MILLILITER(S): at 03:29

## 2018-09-28 RX ADMIN — Medication 3 MILLILITER(S): at 13:49

## 2018-09-28 RX ADMIN — Medication 250 MILLIGRAM(S): at 18:16

## 2018-09-28 RX ADMIN — Medication 0.5 MILLIGRAM(S): at 21:49

## 2018-09-28 RX ADMIN — Medication 1 DROP(S): at 12:41

## 2018-09-28 RX ADMIN — FAMOTIDINE 20 MILLIGRAM(S): 10 INJECTION INTRAVENOUS at 18:16

## 2018-09-28 RX ADMIN — Medication 250 MILLIGRAM(S): at 05:33

## 2018-09-28 RX ADMIN — Medication 3 MILLILITER(S): at 08:46

## 2018-09-28 RX ADMIN — Medication 40 MILLIGRAM(S): at 05:34

## 2018-09-28 RX ADMIN — Medication 50 MILLIGRAM(S): at 21:49

## 2018-09-28 RX ADMIN — BRIMONIDINE TARTRATE 1 DROP(S): 2 SOLUTION/ DROPS OPHTHALMIC at 12:46

## 2018-09-28 RX ADMIN — Medication 3 MILLILITER(S): at 20:40

## 2018-09-28 RX ADMIN — LATANOPROST 1 DROP(S): 0.05 SOLUTION/ DROPS OPHTHALMIC; TOPICAL at 21:50

## 2018-09-28 RX ADMIN — FAMOTIDINE 20 MILLIGRAM(S): 10 INJECTION INTRAVENOUS at 05:34

## 2018-09-28 RX ADMIN — CLOPIDOGREL BISULFATE 75 MILLIGRAM(S): 75 TABLET, FILM COATED ORAL at 12:40

## 2018-09-28 RX ADMIN — Medication 40 MILLIGRAM(S): at 14:54

## 2018-09-28 RX ADMIN — Medication 81 MILLIGRAM(S): at 12:40

## 2018-09-28 RX ADMIN — ENOXAPARIN SODIUM 40 MILLIGRAM(S): 100 INJECTION SUBCUTANEOUS at 12:40

## 2018-09-28 NOTE — SWALLOW BEDSIDE ASSESSMENT ADULT - PHARYNGEAL PHASE
4.12 11/07/2011    HGB 9.1 09/13/2018    HCT 26.4 09/13/2018     09/13/2018       CMP:  Lab Results   Component Value Date     09/13/2018    K 4.3 09/13/2018    K 3.9 08/21/2018     09/13/2018    CO2 24 09/13/2018    BUN 32 09/13/2018    CREATININE 1.6 09/13/2018    LABGLOM 41 09/13/2018    GLUCOSE 102 09/13/2018    GLUCOSE 221 11/07/2011    CALCIUM 10.0 09/13/2018       Hepatic Function Panel:  Lab Results   Component Value Date    ALKPHOS 302 09/10/2018    ALT 37 09/10/2018    AST 23 09/10/2018    PROT 8.2 09/10/2018    BILITOT 0.5 09/10/2018    BILIDIR <0.2 09/10/2018    LABALBU 3.5 09/10/2018       Magnesium:    Lab Results   Component Value Date    MG 1.9 09/12/2018       PT/INR:    Lab Results   Component Value Date    INR 1.02 09/10/2018       HgBA1c:    Lab Results   Component Value Date    LABA1C 5.2 01/05/2018       FLP:  Lab Results   Component Value Date    TRIG 112 05/01/2018    HDL 58 05/01/2018    LDLCALC 30 05/01/2018       TSH:    Lab Results   Component Value Date    TSH 1.390 08/08/2018         Assessment:  · Near syncope, pt states he \"choked\"  · Episode non-sustained v-tach, awaiting pacer interrogation  · Ef 55-60 per echo 12/2017  · No evidence of AS in echo 12/2017   · Chronic Diastolic CHF   · DM          Plan:  · Await pacemaker interrogation   · EGD w possible dilation per GI  · Continue to watch rhythm closely   · Continue cardiac medications         Electronically signed by Brandon Ramirez PA-C on 9/13/2018 at 9:24 AM Within functional limits

## 2018-09-28 NOTE — PROGRESS NOTE ADULT - ASSESSMENT
73 year old female COPD, dementia, htn, hld. Patient was admitted on 9/27 hypoxemic requiring bipap. She has responded well to therapy and is breathing comfortably on room on 9/28.

## 2018-09-28 NOTE — PROGRESS NOTE ADULT - PROBLEM SELECTOR PLAN 1
Continue iv solumedrol 40 mg tid, continue nebulizer, Pulm input appreciated   No obvious pulmonary embolus. Yes

## 2018-09-28 NOTE — SWALLOW BEDSIDE ASSESSMENT ADULT - ASR SWALLOW ASPIRATION MONITOR
oral hygiene/pneumonia/cough/upper respiratory infection/change of breathing pattern/gurgly voice/position upright (90Y)/fever/throat clearing

## 2018-09-28 NOTE — PROGRESS NOTE ADULT - SUBJECTIVE AND OBJECTIVE BOX
PULMONARY PROGRESS NOTE      [id number]    Patient is a 73y old  Female who presents with a chief complaint of Shortness of breath (28 Sep 2018 09:11)      INTERVAL HPI/OVERNIGHT EVENTS:  Awake alert without SOB on Room AIr    MEDICATIONS  (STANDING):  ALBUTerol    90 MICROgram(s) HFA Inhaler 1 Puff(s) Inhalation every 4 hours  ALBUTerol/ipratropium for Nebulization 3 milliLiter(s) Nebulizer every 6 hours  ALBUTerol/ipratropium for Nebulization. 3 milliLiter(s) Nebulizer once  aspirin enteric coated 81 milliGRAM(s) Oral daily  atorvastatin 10 milliGRAM(s) Oral at bedtime  brimonidine 0.2% Ophthalmic Solution 1 Drop(s) Both EYES daily  clopidogrel Tablet 75 milliGRAM(s) Oral daily  diltiazem    Tablet 120 milliGRAM(s) Oral daily  enoxaparin Injectable 40 milliGRAM(s) SubCutaneous daily  famotidine    Tablet 20 milliGRAM(s) Oral two times a day  latanoprost 0.005% Ophthalmic Solution 1 Drop(s) Both EYES at bedtime  levoFLOXacin IVPB      levoFLOXacin IVPB 500 milliGRAM(s) IV Intermittent every 24 hours  methylPREDNISolone sodium succinate Injectable 40 milliGRAM(s) IV Push three times a day  saccharomyces boulardii 250 milliGRAM(s) Oral two times a day  timolol 0.5% Solution 1 Drop(s) Both EYES daily  tiotropium 18 MICROgram(s) Capsule 1 Capsule(s) Inhalation daily  traZODone 50 milliGRAM(s) Oral at bedtime      MEDICATIONS  (PRN):      Allergies    Pen-V (Anaphylaxis)    Intolerances    [facility] TID- RD VINI (Unknown)      PAST MEDICAL & SURGICAL HISTORY:  Glaucoma  PVD (peripheral vascular disease)  COPD (chronic obstructive pulmonary disease)  Chronic atrial fibrillation  No significant past surgical history      SOCIAL HISTORY  Smoking History:       REVIEW OF SYSTEMS:    CONSTITUTIONAL:  No distress    HEENT:  Eyes:  No diplopia or blurred vision. ENT:  No earache, sore throat or runny nose.    CARDIOVASCULAR:  No pressure, squeezing, tightness, heaviness or aching about the chest; no palpitations.    RESPIRATORY:  No cough, wheeze, shortness of breath, PND or orthopnea. Mild SOBOE    GASTROINTESTINAL:  No nausea, vomiting or diarrhea.    GENITOURINARY:  No dysuria, frequency or urgency.    NEUROLOGIC:  No paresthesias, fasciculations, seizures or weakness.    Extremities: No cyanosis, clubbing or edema    PSYCHIATRIC:  No disorder of thought or mood.    Vital Signs Last 24 Hrs  T(C): 36.8 (28 Sep 2018 09:00), Max: 36.8 (28 Sep 2018 09:00)  T(F): 98.2 (28 Sep 2018 09:00), Max: 98.2 (28 Sep 2018 09:00)  HR: 91 (28 Sep 2018 09:00) (80 - 95)  BP: 164/86 (28 Sep 2018 09:00) (132/82 - 164/86)  BP(mean): --  RR: 18 (28 Sep 2018 09:00) (18 - 24)  SpO2: 96% (28 Sep 2018 13:48) (92% - 99%)    PHYSICAL EXAMINATION:    GENERAL: The patient is awake and alert in no apparent distress.     HEENT: Head is normocephalic and atraumatic. Extraocular muscles are intact. Mucous membranes are moist.    NECK: Supple.    LUNGS: Clear to auscultation without wheezing, rales or rhonchi; respirations unlabored    HEART: Regular rate and rhythm without murmur.    ABDOMEN: Soft, nontender, and nondistended.      EXTREMITIES: Without any cyanosis, clubbing, rash, lesions or edema.    NEUROLOGIC: Grossly intact.    LABS:                        12.1   10.5  )-----------( 509      ( 28 Sep 2018 07:04 )             39.0     09-28    142  |  104  |  19.0  ----------------------------<  127<H>  4.1   |  25.0  |  0.56    Ca    9.5      28 Sep 2018 07:04  Phos  3.1     09-28  Mg     2.1     09-28    TPro  7.8  /  Alb  4.0  /  TBili  0.3<L>  /  DBili  x   /  AST  15  /  ALT  12  /  AlkPhos  93  09-27    PT/INR - ( 27 Sep 2018 07:25 )   PT: 11.5 sec;   INR: 1.04 ratio         PTT - ( 27 Sep 2018 07:25 )  PTT:35.1 sec    ABG - ( 27 Sep 2018 06:59 )  pH, Arterial: 7.38  pH, Blood: x     /  pCO2: 46    /  pO2: 224   / HCO3: 26    / Base Excess: 1.2   /  SaO2: 100               CARDIAC MARKERS ( 27 Sep 2018 07:25 )  x     / 0.01 ng/mL / x     / x     / x            Serum Pro-Brain Natriuretic Peptide: 127 pg/mL (09-27-18 @ 07:25)      Procalcitonin, Serum: <0.05 ng/mL (09-27-18 @ 23:50)      MICROBIOLOGY:      RADIOLOGY & ADDITIONAL STUDIES:     EXAM:  US DPLX LWR EXT VEINS COMPL BI                          PROCEDURE DATE:  09/27/2018          INTERPRETATION:  Ultrasound of the lower extremity deep venous system         CLINICAL INFORMATION:  LEFT lower extremity pain., evaluate for deep   venous thrombosis.    TECHNIQUE:   Duplex ultrasonography with color and spectral doppler of   the bilateral lower extremity deep venous system was performed.    FINDINGS:    No previous examinations are  available for review.    The bilateral lowerextremity deep venous system demonstrated no   abnormality.  The veins were patent with intact Doppler flow.  The flow   varied with respiration and augmented with distal calf compression.  The   veins were directly compressible by the ultrasound transducer.       The veins evaluated included the common femoral vein, the inflow of the   greater saphenous vein, the inflow of the deep femoral vein, the   superficial femoral vein, the popliteal vein and posterior tibial veins.        IMPRESSION:   Unremarkable ultrasound of the bilateral lower extremity   deep venous system.       Additional comment: LEFT femoral artery is occluded .                KIT HAIR M.D., ATTENDING RADIOLOGIST  This document has been electronically signed. Sep [id number] 10:19AM             EXAM:  CT ANGIO CHEST (W)AW IC                          PROCEDURE DATE:  09/27/2018          INTERPRETATION:  EXAM: CTA CHEST WITH CONTRAST.     CLINICAL INDICATION: Evaluate for pulmonary embolism. History of DVT.     TECHNIQUE: Imaging was performed following the administration of   intravenous contrast. 73 mL of Omnipaque 350 was utilized for contrast   enhancement and 27 mL was discarded. Axial, sagittal, coronal and MIP   scans are reviewed.     PRIOR EXAM: None.     FINDINGS:      Cervicothoracic junction and axillary regions are unremarkable. No   endotracheal or central endobronchial lesion is seen. There is no hilar   or mediastinal lymphadenopathy. Ascending thoracic aorta is dilated   measuring 3.4 cm. There is no aortic dissection. A small pericardial   effusion is seen.    There is no evidence of pulmonary embolism.    There are reticular opacities in both lung bases suggesting subsegmental   atelectasis/scarring. No pulmonary consolidation, lung mass, pleural   effusion or pneumothorax is seen.    Visualized upper abdomen is unremarkable. There is evidence of age   indeterminate endplate compressions of T8 and L1. No significant   retropulsion is seen.    Probable moderate degree of stenosis is seen at the origin of left renal   artery.      IMPRESSION:     No evidence of pulmonary embolism.    Additional findings as above.                  RAYMON AMOR M.D., ATTENDING RADIOLOGIST  This document has been electronically signed. Sep 27 2018  9:49AMRADIOLOGY & ADDITIONAL STUDIES:  All films reviewed on PACS

## 2018-09-28 NOTE — PROGRESS NOTE ADULT - SUBJECTIVE AND OBJECTIVE BOX
CHERELLE BEGUM     Chief Complaint: Patient is a 73y old  Female who presents with a chief complaint of Shortness of breath (28 Sep 2018 06:36)      PAST MEDICAL & SURGICAL HISTORY:  Glaucoma  PVD (peripheral vascular disease)  COPD (chronic obstructive pulmonary disease)  Chronic atrial fibrillation  No significant past surgical history      HPI/OVERNIGHT EVENTS: Patient awake and alert, comfortable without supplemental oxygen.    MEDICATIONS  (STANDING):  ALBUTerol    90 MICROgram(s) HFA Inhaler 1 Puff(s) Inhalation every 4 hours  ALBUTerol/ipratropium for Nebulization 3 milliLiter(s) Nebulizer every 6 hours  ALBUTerol/ipratropium for Nebulization. 3 milliLiter(s) Nebulizer once  aspirin enteric coated 81 milliGRAM(s) Oral daily  atorvastatin 10 milliGRAM(s) Oral at bedtime  brimonidine 0.2% Ophthalmic Solution 1 Drop(s) Both EYES daily  clopidogrel Tablet 75 milliGRAM(s) Oral daily  diltiazem    Tablet 120 milliGRAM(s) Oral daily  enoxaparin Injectable 40 milliGRAM(s) SubCutaneous daily  famotidine    Tablet 20 milliGRAM(s) Oral two times a day  latanoprost 0.005% Ophthalmic Solution 1 Drop(s) Both EYES at bedtime  levoFLOXacin IVPB      levoFLOXacin IVPB 500 milliGRAM(s) IV Intermittent every 24 hours  methylPREDNISolone sodium succinate Injectable 40 milliGRAM(s) IV Push three times a day  saccharomyces boulardii 250 milliGRAM(s) Oral two times a day  timolol 0.5% Solution 1 Drop(s) Both EYES daily  tiotropium 18 MICROgram(s) Capsule 1 Capsule(s) Inhalation daily  traZODone 50 milliGRAM(s) Oral at bedtime      Vital Signs Last 24 Hrs  T(C): 36.7 (28 Sep 2018 04:34), Max: 36.7 (28 Sep 2018 04:34)  T(F): 98 (28 Sep 2018 04:34), Max: 98 (28 Sep 2018 04:34)  HR: 84 (28 Sep 2018 04:34) (80 - 95)  BP: 150/80 (28 Sep 2018 04:44) (132/82 - 159/70)  BP(mean): --  RR: 20 (28 Sep 2018 04:34) (20 - 24)  SpO2: 96% (28 Sep 2018 08:15) (92% - 100%)    PHYSICAL EXAM:  Constitutional: NAD, well-groomed, well-developed  HEENT: PERRLA, EOMI, Normal Hearing, MMM  Neck: No LAD, No JVD  Back: Normal spine flexure, No CVA tenderness  Respiratory: positive wheeze right greater than left   Cardiovascular: S1 and S2, RRR, no M/G/R  Gastrointestinal: BS+, soft, NT/ND  Extremities: No peripheral edema  Vascular: 2+ peripheral pulses  Neurological: A/O x 2    CAPILLARY BLOOD GLUCOSE    LABS:                        12.1   10.5  )-----------( 509      ( 28 Sep 2018 07:04 )             39.0     09-28    142  |  104  |  19.0  ----------------------------<  127<H>  4.1   |  25.0  |  0.56    Ca    9.5      28 Sep 2018 07:04  Phos  3.1     09-28  Mg     2.1     09-28    TPro  7.8  /  Alb  4.0  /  TBili  0.3<L>  /  DBili  x   /  AST  15  /  ALT  12  /  AlkPhos  93  09-27    PT/INR - ( 27 Sep 2018 07:25 )   PT: 11.5 sec;   INR: 1.04 ratio         PTT - ( 27 Sep 2018 07:25 )  PTT:35.1 sec      RADIOLOGY & ADDITIONAL TESTS:

## 2018-09-28 NOTE — PROGRESS NOTE ADULT - PROBLEM SELECTOR PROBLEM 1
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

## 2018-09-28 NOTE — CONSULT NOTE ADULT - SUBJECTIVE AND OBJECTIVE BOX
[facility], [facility]                                                    [address] , [address], [place] [address]                                                         PHONE: [phone]    FAX: [phone] [address] [facility] [facility], [address], [place] [address]                                                 PHONE: [phone]    FAX: [phone]  *******************************************************************************    Reason for Consult:    HPI:  CHERELLE BEGUM is a 73y Female    PAST MEDICAL & SURGICAL HISTORY:  Glaucoma  PVD (peripheral vascular disease)  COPD (chronic obstructive pulmonary disease)  Chronic atrial fibrillation  No significant past surgical history      Pen-V (Anaphylaxis)  [facility] HEALTH SHAKE TID- RD OKAY (Unknown)      MEDICATIONS  (STANDING):  ALBUTerol    90 MICROgram(s) HFA Inhaler 1 Puff(s) Inhalation every 4 hours  ALBUTerol/ipratropium for Nebulization 3 milliLiter(s) Nebulizer every 6 hours  ALBUTerol/ipratropium for Nebulization. 3 milliLiter(s) Nebulizer once  aspirin enteric coated 81 milliGRAM(s) Oral daily  atorvastatin 10 milliGRAM(s) Oral at bedtime  brimonidine 0.2% Ophthalmic Solution 1 Drop(s) Both EYES daily  clopidogrel Tablet 75 milliGRAM(s) Oral daily  diltiazem    Tablet 120 milliGRAM(s) Oral daily  enoxaparin Injectable 40 milliGRAM(s) SubCutaneous daily  famotidine    Tablet 20 milliGRAM(s) Oral two times a day  latanoprost 0.005% Ophthalmic Solution 1 Drop(s) Both EYES at bedtime  levoFLOXacin IVPB      levoFLOXacin IVPB 500 milliGRAM(s) IV Intermittent every 24 hours  methylPREDNISolone sodium succinate Injectable 40 milliGRAM(s) IV Push three times a day  saccharomyces boulardii 250 milliGRAM(s) Oral two times a day  timolol 0.5% Solution 1 Drop(s) Both EYES daily  tiotropium 18 MICROgram(s) Capsule 1 Capsule(s) Inhalation daily  traZODone 50 milliGRAM(s) Oral at bedtime    MEDICATIONS  (PRN):      Social History: no active tobacco / EtOH / IVDA    Family History: Family history of COPD (chronic obstructive pulmonary disease) (Father)  No pertinent family history in first degree relatives      ROS: As noted above, otherwise unremarkable.    Vital Signs Last 24 Hrs  T(C): 36.7 (28 Sep 2018 04:34), Max: 36.7 (28 Sep 2018 04:34)  T(F): 98 (28 Sep 2018 04:34), Max: 98 (28 Sep 2018 04:34)  HR: 84 (28 Sep 2018 04:34) (80 - 95)  BP: 150/80 (28 Sep 2018 04:44) (116/80 - 159/70)  BP(mean): --  RR: 20 (28 Sep 2018 04:34) (20 - 28)  SpO2: 94% (28 Sep 2018 04:34) (92% - 100%)    I&O's Detail    I&O's Summary          PHYSICAL EXAM:  General: Appears well developed, well nourished, no acute distress  HEENT: Head: normocephalic, atraumatic  Eyes: Pupils equal and reactive  Neck: Supple, no carotid bruit, no JVD, no HJR  CARDIOVASCULAR: Normal S1 and S2, no murmur, rub, or gallop  LUNGS: Clear to auscultation bilaterally, no rales, rhonchi or wheeze  ABDOMEN: Soft, nontender, non-distended, positive bowel sounds, no mass or bruit  EXTREMITIES: No edema, distal pulses WNL  SKIN: Warm and dry with normal turgor  NEURO: Alert & oriented x 3, grossly intact  PSYCH: normal mood and affect    REVIEW OF SYSTEMS:  CONSTITUTIONAL: No fever, weight loss, or fatigue  EYES: No eye pain, visual disturbances, or discharge  ENMT:  No difficulty hearing, tinnitus, vertigo; No sinus or throat pain  NECK: No pain or stiffness  RESPIRATORY: No cough, wheezing, chills or hemoptysis; No Shortness of Breath  CARDIOVASCULAR: No chest pain, palpitations, passing out, dizziness, or leg swelling  GASTROINTESTINAL: No abdominal or epigastric pain. No nausea, vomiting, or hematemesis; No diarrhea or constipation. No melena or hematochezia.  GENITOURINARY: No dysuria, frequency, hematuria, or incontinence  NEUROLOGICAL: No headaches, memory loss, loss of strength, numbness, or tremors  SKIN: No itching, burning, rashes, or lesions   LYMPH Nodes: No enlarged glands  ENDOCRINE: No heat or cold intolerance; No hair loss  MUSCULOSKELETAL: No joint pain or swelling; No muscle, back, or extremity pain  PSYCHIATRIC: No depression, anxiety, mood swings, or difficulty sleeping  HEME/LYMPH: No easy bruising, or bleeding gums  ALLERY AND IMMUNOLOGIC: No hives or eczema    LABS:                        12.2   14.2  )-----------( 485      ( 27 Sep 2018 07:25 )             38.6     09-27    143  |  103  |  18.0  ----------------------------<  113  3.7   |  27.0  |  0.64    Ca    9.8      27 Sep 2018 07:25  Mg     2.2     09-27    TPro  7.8  /  Alb  4.0  /  TBili  0.3<L>  /  DBili  x   /  AST  15  /  ALT  12  /  AlkPhos  93  09-27    CARDIAC MARKERS ( 27 Sep 2018 07:25 )  x     / 0.01 ng/mL / x     / x     / x          PT/INR - ( 27 Sep 2018 07:25 )   PT: 11.5 sec;   INR: 1.04 ratio         PTT - ( 27 Sep 2018 07:25 )  PTT:35.1 sec    RADIOLOGY & ADDITIONAL STUDIES:    ECG: SR    < from: US Duplex Venous Lower Ext Complete, Bilateral (09.27.18 @ 10:12) >  IMPRESSION:   Unremarkable ultrasound of the bilateral lower extremity   deep venous system.       Additional comment: LEFT femoral artery is occluded .    < end of copied text >    < from: CT Angio Chest w/ IV Cont (09.27.18 @ 09:48) >   FINDINGS:      Cervicothoracic junction and axillary regions are unremarkable. No   endotracheal or central endobronchial lesion is seen. There is no hilar   or mediastinal lymphadenopathy. Ascending thoracic aorta is dilated   measuring 3.4 cm. There is no aortic dissection. A small pericardial   effusion is seen.    There is no evidence of pulmonary embolism.    There are reticular opacities in both lung bases suggesting subsegmental   atelectasis/scarring. No pulmonary consolidation, lung mass, pleural   effusion or pneumothorax is seen.    Visualized upper abdomen is unremarkable. There is evidence of age   indeterminate endplate compressions of T8 and L1. No significant   retropulsion is seen.    Probable moderate degree of stenosis is seen at the origin of left renal   artery.      IMPRESSION:     No evidence of pulmonary embolism.    Additional findings as above.    < end of copied text >      Assessment and Plan:  In summary, CHERELLE BEGUM is a 73y Female with past medical history significant for [facility]                                                    [address] , [address], [place] [address]                                                         PHONE: [phone]    FAX: [phone] [address] [facility], [address], [place] [address]                                                 PHONE: [phone]    FAX: [phone]  *******************************************************************************    Reason for Consult: shortness of breath    HPI:  CHERELLE BEGUM is a 73y Female who reports SOB and cough without sputum production progressive over one week.  No fever chills or constitutional symptoms.  Hx of HTN. Denies diabetes.  Former smoker. Hx of COPD (not on oxygen therapy). No CP. No PND or orthopnea. No dizziness or syncope. Hx of PAD. Hx of PAF    Echo 8/16/18 EF 65-70%.. Mild MR, Tr AR  carotid 8/16/18 16-49% bilat  Declined ischemic evaluation  Mod SFA disease on the right. Severe infrapopliteal disease. Left SFA occlusion with severe stenosis of profunda (done due to right toe gangrene)    PAST MEDICAL & SURGICAL HISTORY:  Glaucoma  PVD (peripheral vascular disease)  COPD (chronic obstructive pulmonary disease)  Chronic atrial fibrillation  No significant past surgical history      Pen-V (Anaphylaxis)  [facility] SHAKE TID- RD OKAY (Unknown)      MEDICATIONS  (STANDING):  ALBUTerol    90 MICROgram(s) HFA Inhaler 1 Puff(s) Inhalation every 4 hours  ALBUTerol/ipratropium for Nebulization 3 milliLiter(s) Nebulizer every 6 hours  ALBUTerol/ipratropium for Nebulization. 3 milliLiter(s) Nebulizer once  aspirin enteric coated 81 milliGRAM(s) Oral daily  atorvastatin 10 milliGRAM(s) Oral at bedtime  brimonidine 0.2% Ophthalmic Solution 1 Drop(s) Both EYES daily  clopidogrel Tablet 75 milliGRAM(s) Oral daily  diltiazem    Tablet 120 milliGRAM(s) Oral daily  enoxaparin Injectable 40 milliGRAM(s) SubCutaneous daily  famotidine    Tablet 20 milliGRAM(s) Oral two times a day  latanoprost 0.005% Ophthalmic Solution 1 Drop(s) Both EYES at bedtime  levoFLOXacin IVPB      levoFLOXacin IVPB 500 milliGRAM(s) IV Intermittent every 24 hours  methylPREDNISolone sodium succinate Injectable 40 milliGRAM(s) IV Push three times a day  saccharomyces boulardii 250 milliGRAM(s) Oral two times a day  timolol 0.5% Solution 1 Drop(s) Both EYES daily  tiotropium 18 MICROgram(s) Capsule 1 Capsule(s) Inhalation daily  traZODone 50 milliGRAM(s) Oral at bedtime    MEDICATIONS  (PRN):      Social History: former tobacco /No EtOH / IVDA. worked as     Family History: Family history of COPD (chronic obstructive pulmonary disease) (Father). Father had heart disease of unspecified type        ROS: As noted above, otherwise unremarkable.    Vital Signs Last 24 Hrs  T(C): 36.7 (28 Sep 2018 04:34), Max: 36.7 (28 Sep 2018 04:34)  T(F): 98 (28 Sep 2018 04:34), Max: 98 (28 Sep 2018 04:34)  HR: 84 (28 Sep 2018 04:34) (80 - 95)  BP: 150/80 (28 Sep 2018 04:44) (116/80 - 159/70)  BP(mean): --  RR: 20 (28 Sep 2018 04:34) (20 - 28)  SpO2: 94% (28 Sep 2018 04:34) (92% - 100%)    I&O's Detail    I&O's Summary          PHYSICAL EXAM:  General: Appears well developed, well nourished, no acute distress  HEENT: Head: normocephalic, atraumatic  Eyes: Pupils equal and reactive  Neck: Supple, no carotid bruit, no JVD, no HJR  CARDIOVASCULAR: Normal S1 and S2, no murmur, rub, or gallop  LUNGS: Coarse to auscultation bilaterally, no rales, rhonchi or wheeze  ABDOMEN: Soft, nontender, non-distended, positive bowel sounds, no mass or bruit  EXTREMITIES: No edema, PAD  SKIN: Warm and dry with normal turgor  NEURO: Alert & oriented x 3, grossly intact  PSYCH: normal mood and affect      LABS:                        12.2   14.2  )-----------( 485      ( 27 Sep 2018 07:25 )             38.6     09-27    143  |  103  |  18.0  ----------------------------<  113  3.7   |  27.0  |  0.64    Ca    9.8      27 Sep 2018 07:25  Mg     2.2     09-27    TPro  7.8  /  Alb  4.0  /  TBili  0.3<L>  /  DBili  x   /  AST  15  /  ALT  12  /  AlkPhos  93  09-27    CARDIAC MARKERS ( 27 Sep 2018 07:25 )  x     / 0.01 ng/mL / x     / x     / x          PT/INR - ( 27 Sep 2018 07:25 )   PT: 11.5 sec;   INR: 1.04 ratio         PTT - ( 27 Sep 2018 07:25 )  PTT:35.1 sec    RADIOLOGY & ADDITIONAL STUDIES:    ECG: SR    < from: US Duplex Venous Lower Ext Complete, Bilateral (09.27.18 @ 10:12) >  IMPRESSION:   Unremarkable ultrasound of the bilateral lower extremity   deep venous system.       Additional comment: LEFT femoral artery is occluded .    < end of copied text >    < from: CT Angio Chest w/ IV Cont (09.27.18 @ 09:48) >   FINDINGS:      Cervicothoracic junction and axillary regions are unremarkable. No   endotracheal or central endobronchial lesion is seen. There is no hilar   or mediastinal lymphadenopathy. Ascending thoracic aorta is dilated   measuring 3.4 cm. There is no aortic dissection. A small pericardial   effusion is seen.    There is no evidence of pulmonary embolism.    There are reticular opacities in both lung bases suggesting subsegmental   atelectasis/scarring. No pulmonary consolidation, lung mass, pleural   effusion or pneumothorax is seen.    Visualized upper abdomen is unremarkable. There is evidence of age   indeterminate endplate compressions of T8 and L1. No significant   retropulsion is seen.    Probable moderate degree of stenosis is seen at the origin of left renal   artery.      IMPRESSION:     No evidence of pulmonary embolism.    Additional findings as above.    < end of copied text >      Assessment and Plan:  In summary, CHERELLE BEGUM is a 73y Female with past medical history significant for SOB and cough without sputum production progressive over one week.  No fever chills or constitutional symptoms.  Hx of HTN. Denies diabetes.  Former smoker. Hx of COPD (not on oxygen therapy). No CP. No PND or orthopnea. No dizziness or syncope. Hx of PAD. Hx of PAF    Echo 8/16/18 EF 65-70%.. Mild MR, Tr AR  carotid 8/16/18 16-49% bilat  Declined ischemic evaluation  Mod SFA disease on the right. Severe infrapopliteal disease. Left SFA occlusion with severe stenosis of profunda (done due to right toe gangrene)    - Exacerbation of COPD. Increased WBC. ABX. pulmonary program  - No evidence of CHF or PE on CTA. BNP low at 127  - Serial CE. CE negative x 1. Pt denies CP or symptoms to suggest ACS. Does not wish to pursue ischemic evaluation.  - PAF. Pt with hx of PAF. Currently in SR. Maintain diltiazem 120mg daily. Pt is not on AC as an outpt. ?etiology. On lovenox for PAF for now. ? initiate eliquis 5mg po BID  - PAD. Pt has been seen by vascular in the past due to gangrene of toe on right foot. Maintain ASA, plavix, statin. Continued smoking cessation  - Pt has declined ischemic evaluation in the past. Normal LV function.  Maintain ASA and statin    We will follow with you

## 2018-09-28 NOTE — SWALLOW BEDSIDE ASSESSMENT ADULT - SLP GENERAL OBSERVATIONS
Pt received & seen seated upright via stretcher in ER, +awake/alert, +eating breakfast (self-feeding), +oriented given increased time

## 2018-09-28 NOTE — PROGRESS NOTE ADULT - ASSESSMENT
AECOPD cleared  Stable for dc im AM    Plan:  Change to PO prednisone at 60 mg  taper prednisone over 12 days  Complete ABX  Home O2 if room air sats <88%  Consider Breo 100/25 and Incruse as outpt  Home nebulizer with albuterol  f/u with us in 2wks  recall prn

## 2018-09-28 NOTE — PROGRESS NOTE ADULT - PROBLEM SELECTOR PLAN 2
Patient is not a candidate for anticoagulation secondary to fall risk and mild to moderate dementia.

## 2018-09-29 ENCOUNTER — TRANSCRIPTION ENCOUNTER (OUTPATIENT)
Age: 73
End: 2018-09-29

## 2018-09-29 VITALS
TEMPERATURE: 99 F | HEART RATE: 81 BPM | SYSTOLIC BLOOD PRESSURE: 151 MMHG | DIASTOLIC BLOOD PRESSURE: 83 MMHG | RESPIRATION RATE: 18 BRPM | OXYGEN SATURATION: 95 %

## 2018-09-29 LAB
ANION GAP SERPL CALC-SCNC: 11 MMOL/L — SIGNIFICANT CHANGE UP (ref 5–17)
BUN SERPL-MCNC: 21 MG/DL — HIGH (ref 8–20)
CALCIUM SERPL-MCNC: 9.2 MG/DL — SIGNIFICANT CHANGE UP (ref 8.6–10.2)
CHLORIDE SERPL-SCNC: 101 MMOL/L — SIGNIFICANT CHANGE UP (ref 98–107)
CO2 SERPL-SCNC: 26 MMOL/L — SIGNIFICANT CHANGE UP (ref 22–29)
CREAT SERPL-MCNC: 0.56 MG/DL — SIGNIFICANT CHANGE UP (ref 0.5–1.3)
EOSINOPHIL # BLD AUTO: 0 K/UL — SIGNIFICANT CHANGE UP (ref 0–0.5)
EOSINOPHIL NFR BLD AUTO: 0 % — SIGNIFICANT CHANGE UP (ref 0–6)
GLUCOSE SERPL-MCNC: 124 MG/DL — HIGH (ref 70–115)
HCT VFR BLD CALC: 37.8 % — SIGNIFICANT CHANGE UP (ref 37–47)
HGB BLD-MCNC: 11.8 G/DL — LOW (ref 12–16)
LYMPHOCYTES # BLD AUTO: 1.4 K/UL — SIGNIFICANT CHANGE UP (ref 1–4.8)
LYMPHOCYTES # BLD AUTO: 9.1 % — LOW (ref 20–55)
MAGNESIUM SERPL-MCNC: 2.2 MG/DL — SIGNIFICANT CHANGE UP (ref 1.6–2.6)
MCHC RBC-ENTMCNC: 27.8 PG — SIGNIFICANT CHANGE UP (ref 27–31)
MCHC RBC-ENTMCNC: 31.2 G/DL — LOW (ref 32–36)
MCV RBC AUTO: 89.2 FL — SIGNIFICANT CHANGE UP (ref 81–99)
MONOCYTES # BLD AUTO: 0.8 K/UL — SIGNIFICANT CHANGE UP (ref 0–0.8)
MONOCYTES NFR BLD AUTO: 5.3 % — SIGNIFICANT CHANGE UP (ref 3–10)
NEUTROPHILS # BLD AUTO: 13.4 K/UL — HIGH (ref 1.8–8)
NEUTROPHILS NFR BLD AUTO: 85.2 % — HIGH (ref 37–73)
PHOSPHATE SERPL-MCNC: 2.9 MG/DL — SIGNIFICANT CHANGE UP (ref 2.4–4.7)
PLATELET # BLD AUTO: 524 K/UL — HIGH (ref 150–400)
POTASSIUM SERPL-MCNC: 3.9 MMOL/L — SIGNIFICANT CHANGE UP (ref 3.5–5.3)
POTASSIUM SERPL-SCNC: 3.9 MMOL/L — SIGNIFICANT CHANGE UP (ref 3.5–5.3)
RBC # BLD: 4.24 M/UL — LOW (ref 4.4–5.2)
RBC # FLD: 16.6 % — HIGH (ref 11–15.6)
SODIUM SERPL-SCNC: 138 MMOL/L — SIGNIFICANT CHANGE UP (ref 135–145)
WBC # BLD: 15.7 K/UL — HIGH (ref 4.8–10.8)
WBC # FLD AUTO: 15.7 K/UL — HIGH (ref 4.8–10.8)

## 2018-09-29 PROCEDURE — 99239 HOSP IP/OBS DSCHRG MGMT >30: CPT

## 2018-09-29 RX ORDER — ALBUTEROL 90 UG/1
1 AEROSOL, METERED ORAL
Qty: 1 | Refills: 0 | OUTPATIENT
Start: 2018-09-29 | End: 2018-10-28

## 2018-09-29 RX ORDER — ALENDRONATE SODIUM 70 MG/1
1 TABLET ORAL
Qty: 4 | Refills: 0 | OUTPATIENT
Start: 2018-09-29 | End: 2018-10-28

## 2018-09-29 RX ORDER — TRAZODONE HCL 50 MG
1 TABLET ORAL
Qty: 0 | Refills: 0 | COMMUNITY

## 2018-09-29 RX ORDER — ASPIRIN/CALCIUM CARB/MAGNESIUM 324 MG
1 TABLET ORAL
Qty: 30 | Refills: 0 | OUTPATIENT
Start: 2018-09-29 | End: 2018-10-28

## 2018-09-29 RX ORDER — TIMOLOL 0.5 %
1 DROPS OPHTHALMIC (EYE)
Qty: 0 | Refills: 0 | COMMUNITY

## 2018-09-29 RX ORDER — FAMOTIDINE 10 MG/ML
1 INJECTION INTRAVENOUS
Qty: 28 | Refills: 0 | OUTPATIENT
Start: 2018-09-29 | End: 2018-10-12

## 2018-09-29 RX ORDER — CLOPIDOGREL BISULFATE 75 MG/1
1 TABLET, FILM COATED ORAL
Qty: 0 | Refills: 0 | COMMUNITY

## 2018-09-29 RX ORDER — ALENDRONATE SODIUM 70 MG/1
1 TABLET ORAL
Qty: 0 | Refills: 0 | COMMUNITY

## 2018-09-29 RX ORDER — TRAZODONE HCL 50 MG
1 TABLET ORAL
Qty: 30 | Refills: 0 | OUTPATIENT
Start: 2018-09-29 | End: 2018-10-28

## 2018-09-29 RX ORDER — SACCHAROMYCES BOULARDII 250 MG
1 POWDER IN PACKET (EA) ORAL
Qty: 4 | Refills: 0 | OUTPATIENT
Start: 2018-09-29 | End: 2018-09-30

## 2018-09-29 RX ORDER — ASPIRIN/CALCIUM CARB/MAGNESIUM 324 MG
1 TABLET ORAL
Qty: 0 | Refills: 0 | COMMUNITY

## 2018-09-29 RX ORDER — TIOTROPIUM BROMIDE 18 UG/1
1 CAPSULE ORAL; RESPIRATORY (INHALATION)
Qty: 1 | Refills: 0 | OUTPATIENT
Start: 2018-09-29 | End: 2018-10-28

## 2018-09-29 RX ORDER — TIMOLOL 0.5 %
1 DROPS OPHTHALMIC (EYE)
Qty: 1 | Refills: 0 | OUTPATIENT
Start: 2018-09-29 | End: 2018-10-28

## 2018-09-29 RX ORDER — DILTIAZEM HCL 120 MG
1 CAPSULE, EXT RELEASE 24 HR ORAL
Qty: 30 | Refills: 0 | OUTPATIENT
Start: 2018-09-29 | End: 2018-10-28

## 2018-09-29 RX ORDER — LATANOPROST 0.05 MG/ML
1 SOLUTION/ DROPS OPHTHALMIC; TOPICAL
Qty: 1 | Refills: 0 | OUTPATIENT
Start: 2018-09-29 | End: 2018-10-28

## 2018-09-29 RX ORDER — CLOPIDOGREL BISULFATE 75 MG/1
1 TABLET, FILM COATED ORAL
Qty: 30 | Refills: 0 | OUTPATIENT
Start: 2018-09-29 | End: 2018-10-28

## 2018-09-29 RX ORDER — BRIMONIDINE TARTRATE 2 MG/MG
1 SOLUTION/ DROPS OPHTHALMIC
Qty: 0 | Refills: 0 | COMMUNITY

## 2018-09-29 RX ORDER — LATANOPROST 0.05 MG/ML
1 SOLUTION/ DROPS OPHTHALMIC; TOPICAL
Qty: 0 | Refills: 0 | COMMUNITY

## 2018-09-29 RX ORDER — ATORVASTATIN CALCIUM 80 MG/1
1 TABLET, FILM COATED ORAL
Qty: 30 | Refills: 0 | OUTPATIENT
Start: 2018-09-29 | End: 2018-10-28

## 2018-09-29 RX ORDER — BRIMONIDINE TARTRATE 2 MG/MG
1 SOLUTION/ DROPS OPHTHALMIC
Qty: 1 | Refills: 0 | OUTPATIENT
Start: 2018-09-29 | End: 2018-10-28

## 2018-09-29 RX ADMIN — FAMOTIDINE 20 MILLIGRAM(S): 10 INJECTION INTRAVENOUS at 06:19

## 2018-09-29 RX ADMIN — Medication 40 MILLIGRAM(S): at 13:06

## 2018-09-29 RX ADMIN — Medication 250 MILLIGRAM(S): at 06:19

## 2018-09-29 RX ADMIN — Medication 1 DROP(S): at 13:02

## 2018-09-29 RX ADMIN — CLOPIDOGREL BISULFATE 75 MILLIGRAM(S): 75 TABLET, FILM COATED ORAL at 12:59

## 2018-09-29 RX ADMIN — Medication 3 MILLILITER(S): at 02:50

## 2018-09-29 RX ADMIN — BRIMONIDINE TARTRATE 1 DROP(S): 2 SOLUTION/ DROPS OPHTHALMIC at 13:03

## 2018-09-29 RX ADMIN — Medication 40 MILLIGRAM(S): at 06:19

## 2018-09-29 RX ADMIN — Medication 3 MILLILITER(S): at 13:12

## 2018-09-29 RX ADMIN — Medication 81 MILLIGRAM(S): at 12:59

## 2018-09-29 NOTE — DISCHARGE NOTE ADULT - PLAN OF CARE
Stable respiration Follow up with [facility], Continue albuterol, spiriva and prednisone taper No anticoagulation at this time She is rate controlled, follow up with cardiology Supportive care I spoke with daughter Rita who prefers to take care of mom at home. She is aware of all risk.

## 2018-09-29 NOTE — PROGRESS NOTE ADULT - SUBJECTIVE AND OBJECTIVE BOX
[facility], [facility]                                          [address] , [address], [place] [address]                                               PHONE: [phone]    FAX: [phone] [address] [facility] [facility], [address], [place] [address]                                       PHONE: [phone]    FAX: [phone]  *******************************************************************************    Overnight events/Subjective Assessment:    INTERPRETATION OF TELEMETRY (personally reviewed):    Pen-V (Anaphylaxis)  [facility] HEALTH SHAKE TID- RD OKAY (Unknown)    MEDICATIONS  (STANDING):  ALBUTerol    90 MICROgram(s) HFA Inhaler 1 Puff(s) Inhalation every 4 hours  ALBUTerol/ipratropium for Nebulization 3 milliLiter(s) Nebulizer every 6 hours  aspirin enteric coated 81 milliGRAM(s) Oral daily  atorvastatin 10 milliGRAM(s) Oral at bedtime  brimonidine 0.2% Ophthalmic Solution 1 Drop(s) Both EYES daily  clopidogrel Tablet 75 milliGRAM(s) Oral daily  diltiazem    Tablet 120 milliGRAM(s) Oral daily  enoxaparin Injectable 40 milliGRAM(s) SubCutaneous daily  famotidine    Tablet 20 milliGRAM(s) Oral two times a day  latanoprost 0.005% Ophthalmic Solution 1 Drop(s) Both EYES at bedtime  levoFLOXacin IVPB      levoFLOXacin IVPB 500 milliGRAM(s) IV Intermittent every 24 hours  methylPREDNISolone sodium succinate Injectable 40 milliGRAM(s) IV Push three times a day  saccharomyces boulardii 250 milliGRAM(s) Oral two times a day  timolol 0.5% Solution 1 Drop(s) Both EYES daily  tiotropium 18 MICROgram(s) Capsule 1 Capsule(s) Inhalation daily  traZODone 50 milliGRAM(s) Oral at bedtime    MEDICATIONS  (PRN):      Vital Signs Last 24 Hrs  T(C): 36.7 (29 Sep 2018 07:50), Max: 37 (28 Sep 2018 19:04)  T(F): 98.1 (29 Sep 2018 07:50), Max: 98.6 (28 Sep 2018 19:04)  HR: 87 (29 Sep 2018 07:50) (87 - 105)  BP: 126/79 (29 Sep 2018 07:50) (126/79 - 170/95)  BP(mean): 121 (29 Sep 2018 04:37) (121 - 121)  RR: 20 (29 Sep 2018 07:50) (18 - 20)  SpO2: 97% (29 Sep 2018 07:50) (95% - 100%)    I&O's Detail    I&O's Summary          PHYSICAL EXAM:  General: Appears well developed, well nourished, no acute distress  HEENT: Head: normocephalic, atraumatic  Eyes: Pupils equal and reactive  Neck: Supple, no carotid bruit, no JVD, no HJR  CARDIOVASCULAR: Normal S1 and S2, no murmur, rub, or gallop  LUNGS: Clear to auscultation bilaterally, no rales, rhonchi or wheeze  ABDOMEN: Soft, nontender, non-distended, positive bowel sounds, no mass or bruit  EXTREMITIES: No edema, distal pulses WNL  SKIN: Warm and dry with normal turgor  NEURO: Alert & oriented x 3, grossly intact  PSYCH: normal mood and affect        LABS:                        11.8   15.7  )-----------( 524      ( 29 Sep 2018 08:19 )             37.8     09-29    138  |  101  |  21.0<H>  ----------------------------<  124<H>  3.9   |  26.0  |  0.56    Ca    9.2      29 Sep 2018 08:19  Phos  2.9     09-29  Mg     2.2     09-29      Serum Pro-Brain Natriuretic Peptide: 127 pg/mL (09-27 @ 07:25)  serum  Lipids:     Thyroid Stimulating Hormone, Serum: 0.39 uIU/mL (09-28 @ 07:04)  Thyroid Stimulating Hormone, Serum: 3.45 uIU/mL (09-27 @ 07:25)      RADIOLOGY & ADDITIONAL STUDIES:    ECG: sinus rhythm @85/min.     < from: US Duplex Venous Lower Ext Complete, Bilateral (09.27.18 @ 10:12) >  IMPRESSION:   Unremarkable ultrasound of the bilateral lower extremity   deep venous system.       Additional comment: LEFT femoral artery is occluded .    < end of copied text >    < from: CT Angio Chest w/ IV Cont (09.27.18 @ 09:48) >   FINDINGS:      Cervicothoracic junction and axillary regions are unremarkable. No   endotracheal or central endobronchial lesion is seen. There is no hilar   or mediastinal lymphadenopathy. Ascending thoracic aorta is dilated   measuring 3.4 cm. There is no aortic dissection. A small pericardial   effusion is seen.    There is no evidence of pulmonary embolism.    There are reticular opacities in both lung bases suggesting subsegmental   atelectasis/scarring. No pulmonary consolidation, lung mass, pleural   effusion or pneumothorax is seen.    Visualized upper abdomen is unremarkable. There is evidence of age   indeterminate endplate compressions of T8 and L1. No significant   retropulsion is seen.    Probable moderate degree of stenosis is seen at the origin of left renal   artery.      Echo 8/16/18 EF 65-70%.. Mild MR, Tr AR  carotid 8/16/18 16-49% bilat  Declined ischemic evaluation  Mod SFA disease on the right. Severe infrapopliteal disease. Left SFA occlusion with severe stenosis of profunda (done due to right toe gangrene)      IMPRESSION:     No evidence of pulmonary embolism.    Additional findings as above.    < end of copied text >      ASSESSMENT AND PLAN:  In summary, CHERELLE BEGUM is a 73y woman with significant history of HTN, HLD, PVD, paroxsymal afib, and prior smoking complicated by COPD, admitted with shortness of breath secondary to acute COPD exacerbation.     - The patient has been chest pain free since admission without ischemic ECG abnormalities and negative troponin and has ruled out for acute MI.   - No evidence of acute coronary ischemia or decompensated HF.  The patient has declined ischemic evaluation.  Continue close outpatient follow up.   - Now improved following steroids.  Pulmonary evaluation reviewed and appreciated.  Okay for discharge on PO steroids.   - Paroxsymal afib: maintaining sinus rhythm.  Continue current regimen.  CHADSVASC score is elevated. Patient maintained off ac due to dementia/fall risk. AF.  - PAD. Pt has been seen by vascular in the past due to gangrene of toe on right foot. Maintain ASA, plavix, statin. Continued smoking cessation  - Maintain ASA and statin  - No further inpatient cardiovascular work up is indicated.    - Will follow up as needed; please do not hesitate to call with any questions or concerns.     Meño Perry M.D.

## 2018-09-29 NOTE — PHYSICAL THERAPY INITIAL EVALUATION ADULT - GENERAL OBSERVATIONS, REHAB EVAL
Pt received lying on stretcher in ED holding room, (+) continuous pulse ox, NAD. Agreeable to PT evaluation.

## 2018-09-29 NOTE — DISCHARGE NOTE ADULT - CARE PLAN
Principal Discharge DX:	Centrilobular emphysema  Goal:	Stable respiration  Assessment and plan of treatment:	Follow up with [facility], Continue albuterol, spiriva and prednisone taper  Secondary Diagnosis:	Chronic atrial fibrillation  Goal:	No anticoagulation at this time  Assessment and plan of treatment:	She is rate controlled, follow up with cardiology  Secondary Diagnosis:	Late onset Alzheimer's disease without behavioral disturbance  Goal:	Supportive care  Assessment and plan of treatment:	I spoke with daughter Rita who prefers to take care of mom at home. She is aware of all risk.

## 2018-09-29 NOTE — DISCHARGE NOTE ADULT - HOSPITAL COURSE
73 year old female COPD, dementia, htn, hld. Patient was admitted on 9/27 hypoxemic requiring bipap. She has responded well to therapy and is breathing comfortably on room on 9/28.      Problem/Plan - 1:  ·  Problem: COPD exacerbation.  Plan: Continue iv solumedrol 40 mg tid, continue nebulizer, Pulm input appreciated   No obvious pulmonary embolus.      Problem/Plan - 2:  ·  Problem: Chronic atrial fibrillation.  Plan: Patient is not a candidate for anticoagulation secondary to fall risk and mild to moderate dementia.      Problem/Plan - 3:  ·  Problem: PVD (peripheral vascular disease).  Plan: Followed by Dr Kevin as an outpatient.      Problem/Plan - 4:  ·  Problem: Late onset Alzheimer's disease without behavioral disturbance.  Plan: Supportive care, consider aricept.     Attending Attestation:   I was physically present for the key portions of the evaluation and management (E/M) service provided.  I agree with the above history, physical, and plan which I have reviewed and edited where appropriate.     48 minutes spent on total encounter; more than 50% of the visit was spent counseling and/or coordinating care by the attending physician.

## 2018-09-29 NOTE — DISCHARGE NOTE ADULT - MEDICATION SUMMARY - MEDICATIONS TO STOP TAKING
I will STOP taking the medications listed below when I get home from the hospital:    cadexomer iodine 0.9% topical gel  -- 1 application on skin once a day    oxyCODONE-acetaminophen 5 mg-325 mg oral tablet  -- 1 tab(s) by mouth every 8 hours, As Needed -Moderate Pain (4 - 6) MDD:3 pills

## 2018-09-29 NOTE — DISCHARGE NOTE ADULT - PATIENT PORTAL LINK FT
You can access the [facility] Patient Portal, offered by [facility], by registering with the following website: [email or web address]

## 2018-09-29 NOTE — DISCHARGE NOTE ADULT - MEDICATION SUMMARY - MEDICATIONS TO CHANGE
I will SWITCH the dose or number of times a day I take the medications listed below when I get home from the hospital:    Levaquin 750 mg oral tablet  -- 1 tab(s) by mouth once a day   -- Avoid prolonged or excessive exposure to direct and/or artificial sunlight while taking this medication.  Do not take dairy products, antacids, or iron preparations within one hour of this medication.  Finish all this medication unless otherwise directed by prescriber.  May cause drowsiness or dizziness.  Medication should be taken with plenty of water.

## 2018-09-29 NOTE — PHYSICAL THERAPY INITIAL EVALUATION ADULT - ADDITIONAL COMMENTS
Pt is a poor historian. She reports that she lives with her  in a private house. No stairs. Independent at baseline.

## 2018-09-29 NOTE — DISCHARGE NOTE ADULT - CARE PROVIDER_API CALL
Arcadio Felix), Critical Care Medicine; Internal Medicine; Pulmonary Disease  [address] [place] [address]  Phone: [phone]  Fax: [phone]

## 2018-10-02 ENCOUNTER — MOBILE ON CALL (OUTPATIENT)
Age: 73
End: 2018-10-02

## 2018-10-02 ENCOUNTER — CHART COPY (OUTPATIENT)
Age: 73
End: 2018-10-02

## 2018-10-02 VITALS
DIASTOLIC BLOOD PRESSURE: 62 MMHG | HEART RATE: 68 BPM | TEMPERATURE: 98.6 F | OXYGEN SATURATION: 96 % | SYSTOLIC BLOOD PRESSURE: 110 MMHG | RESPIRATION RATE: 20 BRPM

## 2018-10-02 DIAGNOSIS — Z86.69 PERSONAL HISTORY OF OTHER DISEASES OF THE NERVOUS SYSTEM AND SENSE ORGANS: ICD-10-CM

## 2018-10-02 DIAGNOSIS — F02.80 ALZHEIMER'S DISEASE WITH LATE ONSET: ICD-10-CM

## 2018-10-02 DIAGNOSIS — Z86.79 PERSONAL HISTORY OF OTHER DISEASES OF THE CIRCULATORY SYSTEM: ICD-10-CM

## 2018-10-02 DIAGNOSIS — Z87.891 PERSONAL HISTORY OF NICOTINE DEPENDENCE: ICD-10-CM

## 2018-10-02 DIAGNOSIS — G30.1 ALZHEIMER'S DISEASE WITH LATE ONSET: ICD-10-CM

## 2018-10-02 LAB
CULTURE RESULTS: SIGNIFICANT CHANGE UP
CULTURE RESULTS: SIGNIFICANT CHANGE UP
SPECIMEN SOURCE: SIGNIFICANT CHANGE UP
SPECIMEN SOURCE: SIGNIFICANT CHANGE UP

## 2018-10-02 RX ORDER — ASPIRIN 81 MG/1
81 TABLET ORAL DAILY
Refills: 0 | Status: ACTIVE | COMMUNITY

## 2018-10-02 RX ORDER — PREDNISONE 5 MG/1
5 TABLET ORAL
Refills: 0 | Status: COMPLETED | COMMUNITY
Start: 1900-01-01 | End: 2018-10-11

## 2018-10-02 RX ORDER — DILTIAZEM HYDROCHLORIDE 120 MG/1
120 CAPSULE, EXTENDED RELEASE ORAL DAILY
Refills: 0 | Status: ACTIVE | COMMUNITY

## 2018-10-02 RX ORDER — ATORVASTATIN CALCIUM 10 MG/1
10 TABLET, FILM COATED ORAL DAILY
Refills: 0 | Status: ACTIVE | COMMUNITY

## 2018-10-02 RX ORDER — SENNOSIDES 8.6 MG/1
8.6 CAPSULE, GELATIN COATED ORAL DAILY
Refills: 0 | Status: ACTIVE | COMMUNITY

## 2018-10-02 RX ORDER — LATANOPROST/PF 0.005 %
0.01 DROPS OPHTHALMIC (EYE) AT BEDTIME
Refills: 0 | Status: ACTIVE | COMMUNITY

## 2018-10-02 RX ORDER — TIMOLOL MALEATE 5 MG/ML
0.5 SOLUTION OPHTHALMIC DAILY
Refills: 0 | Status: ACTIVE | COMMUNITY

## 2018-10-02 RX ORDER — BRIMONIDINE TARTRATE 2 MG/MG
0.2 SOLUTION/ DROPS OPHTHALMIC DAILY
Refills: 0 | Status: ACTIVE | COMMUNITY

## 2018-10-02 RX ORDER — TRAZODONE HYDROCHLORIDE 50 MG/1
50 TABLET ORAL
Refills: 0 | Status: ACTIVE | COMMUNITY

## 2018-10-02 RX ORDER — ALBUTEROL SULFATE 90 UG/1
108 (90 BASE) AEROSOL, METERED RESPIRATORY (INHALATION)
Refills: 0 | Status: ACTIVE | COMMUNITY

## 2018-10-05 ENCOUNTER — APPOINTMENT (OUTPATIENT)
Dept: PULMONOLOGY | Facility: CLINIC | Age: 73
End: 2018-10-05
Payer: MEDICARE

## 2018-10-05 VITALS — HEART RATE: 89 BPM | DIASTOLIC BLOOD PRESSURE: 60 MMHG | OXYGEN SATURATION: 98 % | SYSTOLIC BLOOD PRESSURE: 118 MMHG

## 2018-10-05 VITALS — HEIGHT: 61 IN | BODY MASS INDEX: 22.09 KG/M2 | WEIGHT: 117 LBS

## 2018-10-05 PROCEDURE — 99214 OFFICE O/P EST MOD 30 MIN: CPT

## 2018-10-05 RX ORDER — CIPROFLOXACIN HYDROCHLORIDE 500 MG/1
500 TABLET, FILM COATED ORAL
Qty: 28 | Refills: 0 | Status: DISCONTINUED | COMMUNITY
End: 2018-10-05

## 2018-10-05 RX ORDER — CIPROFLOXACIN HYDROCHLORIDE 500 MG/1
500 TABLET, FILM COATED ORAL
Qty: 28 | Refills: 0 | Status: DISCONTINUED | COMMUNITY
Start: 2018-08-13 | End: 2018-10-05

## 2018-10-05 RX ORDER — IPRATROPIUM BROMIDE AND ALBUTEROL SULFATE 2.5; .5 MG/3ML; MG/3ML
0.5-2.5 (3) SOLUTION RESPIRATORY (INHALATION) 4 TIMES DAILY
Qty: 270 | Refills: 3 | Status: ACTIVE | COMMUNITY
Start: 2018-10-05 | End: 1900-01-01

## 2018-10-06 ENCOUNTER — LABORATORY RESULT (OUTPATIENT)
Age: 73
End: 2018-10-06

## 2018-10-09 LAB
BASOPHILS # BLD AUTO: 0.04 K/UL
BASOPHILS NFR BLD AUTO: 0.3 %
CRP SERPL-MCNC: 1.27 MG/DL
EOSINOPHIL # BLD AUTO: 0.67 K/UL
EOSINOPHIL NFR BLD AUTO: 5 %
ERYTHROCYTE [SEDIMENTATION RATE] IN BLOOD BY WESTERGREN METHOD: 24 MM/HR
HCT VFR BLD CALC: 37.9 %
HGB BLD-MCNC: 12.6 G/DL
IMM GRANULOCYTES NFR BLD AUTO: 0.6 %
LYMPHOCYTES # BLD AUTO: 3.99 K/UL
LYMPHOCYTES NFR BLD AUTO: 29.8 %
MAN DIFF?: NORMAL
MCHC RBC-ENTMCNC: 29.4 PG
MCHC RBC-ENTMCNC: 33.2 GM/DL
MCV RBC AUTO: 88.3 FL
MONOCYTES # BLD AUTO: 0.79 K/UL
MONOCYTES NFR BLD AUTO: 5.9 %
NEUTROPHILS # BLD AUTO: 7.81 K/UL
NEUTROPHILS NFR BLD AUTO: 58.4 %
PLATELET # BLD AUTO: 491 K/UL
RBC # BLD: 4.29 M/UL
RBC # FLD: 17.4 %
WBC # FLD AUTO: 13.38 K/UL

## 2018-10-10 ENCOUNTER — APPOINTMENT (OUTPATIENT)
Dept: INTERNAL MEDICINE | Facility: CLINIC | Age: 73
End: 2018-10-10
Payer: MEDICARE

## 2018-10-10 VITALS
WEIGHT: 120 LBS | DIASTOLIC BLOOD PRESSURE: 70 MMHG | BODY MASS INDEX: 22.66 KG/M2 | HEIGHT: 61 IN | SYSTOLIC BLOOD PRESSURE: 120 MMHG

## 2018-10-10 DIAGNOSIS — M86.9 OSTEOMYELITIS, UNSPECIFIED: ICD-10-CM

## 2018-10-10 PROCEDURE — 99214 OFFICE O/P EST MOD 30 MIN: CPT

## 2018-10-24 PROCEDURE — 83690 ASSAY OF LIPASE: CPT

## 2018-10-24 PROCEDURE — 87040 BLOOD CULTURE FOR BACTERIA: CPT

## 2018-10-24 PROCEDURE — 36415 COLL VENOUS BLD VENIPUNCTURE: CPT

## 2018-10-24 PROCEDURE — 85610 PROTHROMBIN TIME: CPT

## 2018-10-24 PROCEDURE — 92610 EVALUATE SWALLOWING FUNCTION: CPT

## 2018-10-24 PROCEDURE — 82803 BLOOD GASES ANY COMBINATION: CPT

## 2018-10-24 PROCEDURE — 80053 COMPREHEN METABOLIC PANEL: CPT

## 2018-10-24 PROCEDURE — 94640 AIRWAY INHALATION TREATMENT: CPT

## 2018-10-24 PROCEDURE — 84100 ASSAY OF PHOSPHORUS: CPT

## 2018-10-24 PROCEDURE — 82435 ASSAY OF BLOOD CHLORIDE: CPT

## 2018-10-24 PROCEDURE — 82330 ASSAY OF CALCIUM: CPT

## 2018-10-24 PROCEDURE — 84484 ASSAY OF TROPONIN QUANT: CPT

## 2018-10-24 PROCEDURE — 80048 BASIC METABOLIC PNL TOTAL CA: CPT

## 2018-10-24 PROCEDURE — 94660 CPAP INITIATION&MGMT: CPT

## 2018-10-24 PROCEDURE — 85014 HEMATOCRIT: CPT

## 2018-10-24 PROCEDURE — 85730 THROMBOPLASTIN TIME PARTIAL: CPT

## 2018-10-24 PROCEDURE — 84295 ASSAY OF SERUM SODIUM: CPT

## 2018-10-24 PROCEDURE — 71275 CT ANGIOGRAPHY CHEST: CPT

## 2018-10-24 PROCEDURE — 82947 ASSAY GLUCOSE BLOOD QUANT: CPT

## 2018-10-24 PROCEDURE — 99291 CRITICAL CARE FIRST HOUR: CPT

## 2018-10-24 PROCEDURE — 85027 COMPLETE CBC AUTOMATED: CPT

## 2018-10-24 PROCEDURE — 83605 ASSAY OF LACTIC ACID: CPT

## 2018-10-24 PROCEDURE — 84145 PROCALCITONIN (PCT): CPT

## 2018-10-24 PROCEDURE — 71045 X-RAY EXAM CHEST 1 VIEW: CPT

## 2018-10-24 PROCEDURE — 84443 ASSAY THYROID STIM HORMONE: CPT

## 2018-10-24 PROCEDURE — 93005 ELECTROCARDIOGRAM TRACING: CPT

## 2018-10-24 PROCEDURE — 84132 ASSAY OF SERUM POTASSIUM: CPT

## 2018-10-24 PROCEDURE — 36600 WITHDRAWAL OF ARTERIAL BLOOD: CPT

## 2018-10-24 PROCEDURE — 83880 ASSAY OF NATRIURETIC PEPTIDE: CPT

## 2018-10-24 PROCEDURE — 93970 EXTREMITY STUDY: CPT

## 2018-10-24 PROCEDURE — 83735 ASSAY OF MAGNESIUM: CPT

## 2018-11-08 ENCOUNTER — APPOINTMENT (OUTPATIENT)
Dept: NEUROLOGY | Facility: CLINIC | Age: 73
End: 2018-11-08
Payer: MEDICARE

## 2018-11-08 VITALS
SYSTOLIC BLOOD PRESSURE: 100 MMHG | DIASTOLIC BLOOD PRESSURE: 68 MMHG | WEIGHT: 120 LBS | HEIGHT: 61 IN | BODY MASS INDEX: 22.66 KG/M2

## 2018-11-08 DIAGNOSIS — Z86.59 PERSONAL HISTORY OF OTHER MENTAL AND BEHAVIORAL DISORDERS: ICD-10-CM

## 2018-11-08 PROCEDURE — 99204 OFFICE O/P NEW MOD 45 MIN: CPT

## 2018-11-09 ENCOUNTER — FORM ENCOUNTER (OUTPATIENT)
Age: 73
End: 2018-11-09

## 2018-11-09 LAB
FOLATE SERPL-MCNC: 15.9 NG/ML
TSH SERPL-ACNC: 0.78 UIU/ML
VIT B12 SERPL-MCNC: 876 PG/ML

## 2018-11-10 ENCOUNTER — OUTPATIENT (OUTPATIENT)
Dept: OUTPATIENT SERVICES | Facility: HOSPITAL | Age: 73
LOS: 1 days | End: 2018-11-10
Payer: MEDICARE

## 2018-11-10 ENCOUNTER — APPOINTMENT (OUTPATIENT)
Dept: MRI IMAGING | Facility: CLINIC | Age: 73
End: 2018-11-10
Payer: MEDICARE

## 2018-11-10 DIAGNOSIS — F03.90 UNSPECIFIED DEMENTIA WITHOUT BEHAVIORAL DISTURBANCE: ICD-10-CM

## 2018-11-10 PROCEDURE — 70551 MRI BRAIN STEM W/O DYE: CPT | Mod: 26

## 2018-11-10 PROCEDURE — 70551 MRI BRAIN STEM W/O DYE: CPT

## 2018-11-14 LAB — METHYLMALONATE SERPL-SCNC: 111 NMOL/L

## 2018-11-15 ENCOUNTER — APPOINTMENT (OUTPATIENT)
Dept: NEUROLOGY | Facility: CLINIC | Age: 73
End: 2018-11-15
Payer: MEDICARE

## 2018-11-15 PROCEDURE — 93886 INTRACRANIAL COMPLETE STUDY: CPT

## 2018-11-15 PROCEDURE — 93040 RHYTHM ECG WITH REPORT: CPT

## 2018-11-15 PROCEDURE — 95819 EEG AWAKE AND ASLEEP: CPT

## 2018-11-15 PROCEDURE — 93890: CPT

## 2018-11-20 LAB — T4 FREE SERPL DIALY-MCNC: 1.5 NG/DL

## 2018-12-05 NOTE — PHYSICAL THERAPY INITIAL EVALUATION ADULT - PERSONAL SAFETY AND JUDGMENT, REHAB EVAL
Addendum Note by Griselda Andino CMA at 05/25/18 03:39 PM     Author:  Griselda Andino CMA Service:  (none) Author Type:  Certified Medical Assistant     Filed:  05/25/18 03:39 PM Encounter Date:  5/23/2018 Status:  Signed     :  Griselda Andino CMA (Certified Medical Assistant)       Addended by: GRISELDA ANDINO on: 5/25/2018 03:39 PM        Modules accepted: Orders         Revision History        Date/Time User Provider Type Action    > 05/25/18 03:39 PM Griselda Andino CMA Certified Medical Assistant Sign    Attribution information within the note text is not available.            
impulsive/at risk behaviors demonstrated

## 2018-12-07 ENCOUNTER — APPOINTMENT (OUTPATIENT)
Dept: NEUROLOGY | Facility: CLINIC | Age: 73
End: 2018-12-07
Payer: MEDICARE

## 2018-12-07 VITALS
WEIGHT: 120 LBS | DIASTOLIC BLOOD PRESSURE: 70 MMHG | SYSTOLIC BLOOD PRESSURE: 100 MMHG | BODY MASS INDEX: 22.66 KG/M2 | HEIGHT: 61 IN

## 2018-12-07 DIAGNOSIS — G31.84 MILD COGNITIVE IMPAIRMENT, SO STATED: ICD-10-CM

## 2018-12-07 PROCEDURE — 99214 OFFICE O/P EST MOD 30 MIN: CPT

## 2018-12-07 RX ORDER — DONEPEZIL HYDROCHLORIDE 5 MG/1
5 TABLET ORAL DAILY
Qty: 30 | Refills: 1 | Status: DISCONTINUED | COMMUNITY
Start: 2018-11-16 | End: 2018-12-07

## 2018-12-13 ENCOUNTER — INPATIENT (INPATIENT)
Facility: HOSPITAL | Age: 73
LOS: 4 days | Discharge: HOME | DRG: 812 | End: 2018-12-18
Attending: HOSPITALIST | Admitting: INTERNAL MEDICINE
Payer: MEDICARE

## 2018-12-13 VITALS
SYSTOLIC BLOOD PRESSURE: 136 MMHG | DIASTOLIC BLOOD PRESSURE: 89 MMHG | RESPIRATION RATE: 22 BRPM | OXYGEN SATURATION: 82 % | TEMPERATURE: 98 F | HEART RATE: 56 BPM

## 2018-12-13 DIAGNOSIS — J44.1 CHRONIC OBSTRUCTIVE PULMONARY DISEASE WITH (ACUTE) EXACERBATION: ICD-10-CM

## 2018-12-13 LAB
ALBUMIN SERPL ELPH-MCNC: 4.2 G/DL — SIGNIFICANT CHANGE UP (ref 3.3–5.2)
ALP SERPL-CCNC: 73 U/L — SIGNIFICANT CHANGE UP (ref 40–120)
ALT FLD-CCNC: 10 U/L — SIGNIFICANT CHANGE UP
ANION GAP SERPL CALC-SCNC: 14 MMOL/L — SIGNIFICANT CHANGE UP (ref 5–17)
APTT BLD: 30.5 SEC — SIGNIFICANT CHANGE UP (ref 27.5–36.3)
AST SERPL-CCNC: 19 U/L — SIGNIFICANT CHANGE UP
BILIRUB SERPL-MCNC: 0.2 MG/DL — LOW (ref 0.4–2)
BLD GP AB SCN SERPL QL: SIGNIFICANT CHANGE UP
BUN SERPL-MCNC: 11 MG/DL — SIGNIFICANT CHANGE UP (ref 8–20)
CALCIUM SERPL-MCNC: 9.1 MG/DL — SIGNIFICANT CHANGE UP (ref 8.6–10.2)
CHLORIDE SERPL-SCNC: 104 MMOL/L — SIGNIFICANT CHANGE UP (ref 98–107)
CO2 SERPL-SCNC: 24 MMOL/L — SIGNIFICANT CHANGE UP (ref 22–29)
CREAT SERPL-MCNC: 0.53 MG/DL — SIGNIFICANT CHANGE UP (ref 0.5–1.3)
FERRITIN SERPL-MCNC: 8 NG/ML — LOW (ref 15–150)
FOLATE SERPL-MCNC: >20 NG/ML — SIGNIFICANT CHANGE UP
GLUCOSE SERPL-MCNC: 100 MG/DL — SIGNIFICANT CHANGE UP (ref 70–115)
HCT VFR BLD CALC: 19.5 % — CRITICAL LOW (ref 37–47)
HGB BLD-MCNC: 5.3 G/DL — CRITICAL LOW (ref 12–16)
INR BLD: 1.04 RATIO — SIGNIFICANT CHANGE UP (ref 0.88–1.16)
IRON SATN MFR SERPL: 21 UG/DL — LOW (ref 37–145)
IRON SATN MFR SERPL: 5 % — LOW (ref 14–50)
LACTATE BLDV-MCNC: 0.9 MMOL/L — SIGNIFICANT CHANGE UP (ref 0.5–2)
LIDOCAIN IGE QN: 21 U/L — LOW (ref 22–51)
MCHC RBC-ENTMCNC: 16.5 PG — LOW (ref 27–31)
MCHC RBC-ENTMCNC: 27.2 G/DL — LOW (ref 32–36)
MCV RBC AUTO: 60.6 FL — LOW (ref 81–99)
NT-PROBNP SERPL-SCNC: 494 PG/ML — HIGH (ref 0–300)
OB PNL STL: NEGATIVE — SIGNIFICANT CHANGE UP
PLATELET # BLD AUTO: 684 K/UL — HIGH (ref 150–400)
POTASSIUM SERPL-MCNC: 3.8 MMOL/L — SIGNIFICANT CHANGE UP (ref 3.5–5.3)
POTASSIUM SERPL-SCNC: 3.8 MMOL/L — SIGNIFICANT CHANGE UP (ref 3.5–5.3)
PROT SERPL-MCNC: 6.9 G/DL — SIGNIFICANT CHANGE UP (ref 6.6–8.7)
PROTHROM AB SERPL-ACNC: 12 SEC — SIGNIFICANT CHANGE UP (ref 10–12.9)
RAPID RVP RESULT: SIGNIFICANT CHANGE UP
RBC # BLD: 3.22 M/UL — LOW (ref 4.4–5.2)
RBC # BLD: 3.24 M/UL — LOW (ref 4.4–5.2)
RBC # FLD: 26.8 % — HIGH (ref 11–15.6)
RETICS #: 0.3 K/UL — LOW (ref 25–125)
RETICS/RBC NFR: 0.1 % — LOW (ref 0.5–2.5)
SODIUM SERPL-SCNC: 142 MMOL/L — SIGNIFICANT CHANGE UP (ref 135–145)
TIBC SERPL-MCNC: 448 UG/DL — HIGH (ref 220–430)
TRANSFERRIN SERPL-MCNC: 313 MG/DL — SIGNIFICANT CHANGE UP (ref 192–382)
TROPONIN T SERPL-MCNC: <0.01 NG/ML — SIGNIFICANT CHANGE UP (ref 0–0.06)
TYPE + AB SCN PNL BLD: SIGNIFICANT CHANGE UP
VIT B12 SERPL-MCNC: 704 PG/ML — SIGNIFICANT CHANGE UP (ref 232–1245)
WBC # BLD: 16 K/UL — HIGH (ref 4.8–10.8)
WBC # FLD AUTO: 16 K/UL — HIGH (ref 4.8–10.8)

## 2018-12-13 PROCEDURE — 99291 CRITICAL CARE FIRST HOUR: CPT

## 2018-12-13 PROCEDURE — 93010 ELECTROCARDIOGRAM REPORT: CPT

## 2018-12-13 PROCEDURE — 99223 1ST HOSP IP/OBS HIGH 75: CPT | Mod: AI

## 2018-12-13 PROCEDURE — 71045 X-RAY EXAM CHEST 1 VIEW: CPT | Mod: 26

## 2018-12-13 RX ORDER — IRON SUCROSE 20 MG/ML
200 INJECTION, SOLUTION INTRAVENOUS EVERY 24 HOURS
Qty: 0 | Refills: 0 | Status: COMPLETED | OUTPATIENT
Start: 2018-12-13 | End: 2018-12-18

## 2018-12-13 RX ORDER — BRIMONIDINE TARTRATE 2 MG/MG
1 SOLUTION/ DROPS OPHTHALMIC DAILY
Qty: 0 | Refills: 0 | Status: DISCONTINUED | OUTPATIENT
Start: 2018-12-13 | End: 2018-12-18

## 2018-12-13 RX ORDER — TIOTROPIUM BROMIDE 18 UG/1
1 CAPSULE ORAL; RESPIRATORY (INHALATION) DAILY
Qty: 0 | Refills: 0 | Status: DISCONTINUED | OUTPATIENT
Start: 2018-12-13 | End: 2018-12-18

## 2018-12-13 RX ORDER — PANTOPRAZOLE SODIUM 20 MG/1
40 TABLET, DELAYED RELEASE ORAL
Qty: 0 | Refills: 0 | Status: DISCONTINUED | OUTPATIENT
Start: 2018-12-13 | End: 2018-12-18

## 2018-12-13 RX ORDER — QUETIAPINE FUMARATE 200 MG/1
25 TABLET, FILM COATED ORAL AT BEDTIME
Qty: 0 | Refills: 0 | Status: DISCONTINUED | OUTPATIENT
Start: 2018-12-13 | End: 2018-12-18

## 2018-12-13 RX ORDER — IPRATROPIUM/ALBUTEROL SULFATE 18-103MCG
3 AEROSOL WITH ADAPTER (GRAM) INHALATION ONCE
Qty: 0 | Refills: 0 | Status: COMPLETED | OUTPATIENT
Start: 2018-12-13 | End: 2018-12-13

## 2018-12-13 RX ORDER — ALBUTEROL 90 UG/1
2.5 AEROSOL, METERED ORAL ONCE
Qty: 0 | Refills: 0 | Status: COMPLETED | OUTPATIENT
Start: 2018-12-13 | End: 2018-12-13

## 2018-12-13 RX ORDER — IPRATROPIUM/ALBUTEROL SULFATE 18-103MCG
3 AEROSOL WITH ADAPTER (GRAM) INHALATION EVERY 4 HOURS
Qty: 0 | Refills: 0 | Status: DISCONTINUED | OUTPATIENT
Start: 2018-12-13 | End: 2018-12-18

## 2018-12-13 RX ORDER — MAGNESIUM SULFATE 500 MG/ML
2 VIAL (ML) INJECTION ONCE
Qty: 0 | Refills: 0 | Status: COMPLETED | OUTPATIENT
Start: 2018-12-13 | End: 2018-12-13

## 2018-12-13 RX ORDER — AMLODIPINE BESYLATE 2.5 MG/1
10 TABLET ORAL ONCE
Qty: 0 | Refills: 0 | Status: COMPLETED | OUTPATIENT
Start: 2018-12-13 | End: 2018-12-13

## 2018-12-13 RX ORDER — TIMOLOL 0.5 %
1 DROPS OPHTHALMIC (EYE) DAILY
Qty: 0 | Refills: 0 | Status: DISCONTINUED | OUTPATIENT
Start: 2018-12-13 | End: 2018-12-18

## 2018-12-13 RX ORDER — LATANOPROST 0.05 MG/ML
1 SOLUTION/ DROPS OPHTHALMIC; TOPICAL AT BEDTIME
Qty: 0 | Refills: 0 | Status: DISCONTINUED | OUTPATIENT
Start: 2018-12-13 | End: 2018-12-18

## 2018-12-13 RX ADMIN — BRIMONIDINE TARTRATE 1 DROP(S): 2 SOLUTION/ DROPS OPHTHALMIC at 13:50

## 2018-12-13 RX ADMIN — Medication 50 GRAM(S): at 04:58

## 2018-12-13 RX ADMIN — Medication 3 MILLILITER(S): at 20:27

## 2018-12-13 RX ADMIN — Medication 3 MILLILITER(S): at 16:29

## 2018-12-13 RX ADMIN — Medication 3 MILLILITER(S): at 04:41

## 2018-12-13 RX ADMIN — LATANOPROST 1 DROP(S): 0.05 SOLUTION/ DROPS OPHTHALMIC; TOPICAL at 23:41

## 2018-12-13 RX ADMIN — QUETIAPINE FUMARATE 25 MILLIGRAM(S): 200 TABLET, FILM COATED ORAL at 23:42

## 2018-12-13 RX ADMIN — Medication 1 DROP(S): at 13:50

## 2018-12-13 RX ADMIN — ALBUTEROL 2.5 MILLIGRAM(S): 90 AEROSOL, METERED ORAL at 05:05

## 2018-12-13 RX ADMIN — Medication 125 MILLIGRAM(S): at 04:58

## 2018-12-13 RX ADMIN — AMLODIPINE BESYLATE 10 MILLIGRAM(S): 2.5 TABLET ORAL at 23:40

## 2018-12-13 NOTE — ED ADULT NURSE NOTE - NSIMPLEMENTINTERV_GEN_ALL_ED
Implemented All Fall with Harm Risk Interventions:  [place] to call system. Call bell, personal items and telephone within reach. Instruct patient to call for assistance. Room bathroom lighting operational. Non-slip footwear when patient is off stretcher. Physically safe environment: no spills, clutter or unnecessary equipment. Stretcher in lowest position, wheels locked, appropriate side rails in place. Provide visual cue, wrist band, yellow gown, etc. Monitor gait and stability. Monitor for mental status changes and reorient to person, place, and time. Review medications for side effects contributing to fall risk. Reinforce activity limits and safety measures with patient and family. Provide visual clues: red socks.

## 2018-12-13 NOTE — ED PROVIDER NOTE - CARE PLAN
Principal Discharge DX:	COPD exacerbation  Secondary Diagnosis:	Hypoxia Principal Discharge DX:	COPD exacerbation  Secondary Diagnosis:	Hypoxia  Secondary Diagnosis:	Anemia

## 2018-12-13 NOTE — H&P ADULT - ASSESSMENT
> Severe Anemia - unclear etiology.  Stool OB negative, history not c/w acute blood loss.  Fe panel ordered.  Hematology workup requested.  Transfuse 3 units pRBCs.  PPI and clear liquid diet for now.  > COPD - continue nebulizers prn.  Respiratory status appears to be baseline.  > Chronic Afib - Holding Diltiazem for now due to profound anemia.  Will consider restarting in am.   > PVD - no recent stenting / vascular intervention.  Holding antiplatelets.  > GLaucoma - continue eyedrops.  > DVT Prophylaxis - Lower extremity intermittent compression devices.    I discussed advanced directives with the patient / family - made aware of limited prognosis if patient were to be intubated or resuscitated, in consideration of age and comorbid conditions.  Agreed with DNR / DNI.  > Goals of care - I had a lengthy conversation with pt / primary caregiver.  We discussed the comorbid conditions, hospital care, and prognosis.  Total time spent on patient care discussion = 20 minutes.

## 2018-12-13 NOTE — ED ADULT NURSE NOTE - OBJECTIVE STATEMENT
74yo female from home c/o increasing SOB and cough x 2 days. pt on o2 3l nc- resp even and unlabored, + bilat wheezing. productive cough, daughter states chest PT helps induce secretions at home. pt afebrile, denies any fevers, chills, sick contact, recent travel. skin warm and dry, color appropriate, no BLE edema

## 2018-12-13 NOTE — ED PROVIDER NOTE - PROGRESS NOTE DETAILS
Patient with continued wheezing. Will admit Labs called. Anemia is noted. patient declining transfusion at this time. she would request speaking to her daughter and  first. Oxygen sats have improved but wheezing continues. Case discussed with Dr. Mcdaniel

## 2018-12-13 NOTE — H&P ADULT - HISTORY OF PRESENT ILLNESS
73y Female PMH Chronic Afib not on ac due to risk of falls, COPD, PVD, Glaucoma, HLD, presents c/o gradually worsening SOB and fatigue x several weeks.  Symptoms began gradually, progressive, worse with exertion.  Denies wheezing, states her current symptoms are not like prior COPD exacer	bations.  Noted Hgb 5.3 down from 11.8 in September 2018.  Denies bleeding nor changes in stool consistency.  Daughter at bedside states pt had been taken off anticoagulation for Afib due to frequent falls.  No additional complaints.     FAMILY HISTORY: No h/o thalassemia / hematologic disorders in mother, father.   SOCIAL HISTORY: - alcohol, - IVDA, + smoking quit  REVIEW OF SYSTEMS: General: + fatigue, - weight loss, - fevers, - chills.  HEENT: - headache, - hearing disturbances.  EYES: - visual disturbances, - diplopia.  CARDIOVASCULAR: - chest pain, - palpitations.  PULMONARY: + SOB, - cough, - hemoptysis.  GI: - abdominal pain, - nausea, - vomiting, - diarrhea, - constipation.  : - urinary urgency, - frequency, - dysuria.  MUSCULOSKELETAL: - arthralgias, - myalgias.  NEURO:  - weakness, - numbness.  PSYCH: - depression, - anxiety, - suicidal thoughts. SKIN: - rashes, - lesions.  All remaining ROS are negative.Allergies    Pen-V (Anaphylaxis)    Intolerances    SEND HEALTH SHAKE TID- RD OKAY (Unknown)

## 2018-12-13 NOTE — H&P ADULT - NSHPLABSRESULTS_GEN_ALL_CORE
VITALS:  Vital Signs Last 24 Hrs  T(C): 37 (13 Dec 2018 07:58), Max: 37 (13 Dec 2018 07:58)  T(F): 98.6 (13 Dec 2018 07:58), Max: 98.6 (13 Dec 2018 07:58)  HR: 91 (13 Dec 2018 07:58) (56 - 91)  BP: 102/57 (13 Dec 2018 07:58) (102/57 - 136/89)  BP(mean): --  RR: 22 (13 Dec 2018 07:58) (22 - 22)  SpO2: 96% (13 Dec 2018 07:58) (82% - 96%) Daily     Daily   CAPILLARY BLOOD GLUCOSE        I&O's Summary      LABS:                        5.3    16.0  )-----------( 684      ( 13 Dec 2018 05:34 )             19.5     12-13    142  |  104  |  11.0  ----------------------------<  100  3.8   |  24.0  |  0.53    Ca    9.1      13 Dec 2018 05:34    TPro  6.9  /  Alb  4.2  /  TBili  0.2<L>  /  DBili  x   /  AST  19  /  ALT  10  /  AlkPhos  73  12-13      LIVER FUNCTIONS - ( 13 Dec 2018 05:34 )  Alb: 4.2 g/dL / Pro: 6.9 g/dL / ALK PHOS: 73 U/L / ALT: 10 U/L / AST: 19 U/L / GGT: x             CARDIAC MARKERS ( 13 Dec 2018 05:34 )  x     / <0.01 ng/mL / x     / x     / x            MEDICATIONS:  ALBUTerol    0.083%. 2.5 milliGRAM(s) Nebulizer once  ALBUTerol/ipratropium for Nebulization 3 milliLiter(s) Nebulizer every 4 hours PRN  brimonidine 0.2% Ophthalmic Solution 1 Drop(s) Both EYES daily  latanoprost 0.005% Ophthalmic Solution 1 Drop(s) Both EYES at bedtime  pantoprazole    Tablet 40 milliGRAM(s) Oral before breakfast  timolol 0.5% Solution 1 Drop(s) Both EYES daily  tiotropium 18 MICROgram(s) Capsule 1 Capsule(s) Inhalation daily

## 2018-12-13 NOTE — ED ADULT TRIAGE NOTE - CHIEF COMPLAINT QUOTE
Pt BIBA c/o sob r/t COPD exacerbation.  Pt O2 sat 82% on room air.  Dr. Cason at bedside to evaluate pt. Bilat wheezing audible.

## 2018-12-13 NOTE — H&P ADULT - NSHPPHYSICALEXAM_GEN_ALL_CORE
PHYSICAL EXAMINATION:  GENERAL: NAD, Alert and Oriented x 3 HEENT:  Normocephalic and atraumatic.  Extraocular muscles are intact.  NECK: Supple.  - JVD.  CARDIOVASCULAR: irregular S1, S2.  LUNGS: CTAB, - rales, - wheezing, - rhonchi.  BACK: - CVA tenderness.  ABDOMEN: Soft, - tenderness, - distension, + BS.  EXTREMITIES: - cyanosis, - clubbing, - edema.  NEUROLOGIC: strength is symmetric, sensation intact, speech fluent.  PSYCHIATRIC: Calm.  - agitation.    SKIN: - rashes, - lesions.

## 2018-12-13 NOTE — ED ADULT NURSE REASSESSMENT NOTE - COMFORT CARE
side rails up/plan of care explained/warm blanket provided/repositioned/po fluids offered/treatment delay explained/wait time explained

## 2018-12-13 NOTE — ED PROVIDER NOTE - OBJECTIVE STATEMENT
72 yo female acute cough and dyspnea that began this evening. She denies chest pain, nausea, vomiting, headache, or confusion. No improvement with home nebs.

## 2018-12-13 NOTE — ED ADULT NURSE REASSESSMENT NOTE - NS ED NURSE REASSESS COMMENT FT1
Pt received in the stretcher appears slightly anxious, breathing slightly labored, no chest pain reported, refusing oxygen supplement  Oxygen level 95 % on RA , VSS, will continue to monitor . MD at the bedside discussing blood transfusion ,pt states she want to talk to her  and then she will let me know

## 2018-12-14 LAB
ANION GAP SERPL CALC-SCNC: 13 MMOL/L — SIGNIFICANT CHANGE UP (ref 5–17)
BUN SERPL-MCNC: 10 MG/DL — SIGNIFICANT CHANGE UP (ref 8–20)
CALCIUM SERPL-MCNC: 9.7 MG/DL — SIGNIFICANT CHANGE UP (ref 8.6–10.2)
CHLORIDE SERPL-SCNC: 104 MMOL/L — SIGNIFICANT CHANGE UP (ref 98–107)
CO2 SERPL-SCNC: 23 MMOL/L — SIGNIFICANT CHANGE UP (ref 22–29)
CREAT SERPL-MCNC: 0.45 MG/DL — LOW (ref 0.5–1.3)
GLUCOSE SERPL-MCNC: 96 MG/DL — SIGNIFICANT CHANGE UP (ref 70–115)
HCT VFR BLD CALC: 33.3 % — LOW (ref 37–47)
HGB BLD-MCNC: 10.2 G/DL — LOW (ref 12–16)
MCHC RBC-ENTMCNC: 21 PG — LOW (ref 27–31)
MCHC RBC-ENTMCNC: 30.6 G/DL — LOW (ref 32–36)
MCV RBC AUTO: 68.7 FL — LOW (ref 81–99)
PLATELET # BLD AUTO: 645 K/UL — HIGH (ref 150–400)
POTASSIUM SERPL-MCNC: 4.5 MMOL/L — SIGNIFICANT CHANGE UP (ref 3.5–5.3)
POTASSIUM SERPL-SCNC: 4.5 MMOL/L — SIGNIFICANT CHANGE UP (ref 3.5–5.3)
RBC # BLD: 4.85 M/UL — SIGNIFICANT CHANGE UP (ref 4.4–5.2)
SODIUM SERPL-SCNC: 140 MMOL/L — SIGNIFICANT CHANGE UP (ref 135–145)
WBC # BLD: 12.1 K/UL — HIGH (ref 4.8–10.8)
WBC # FLD AUTO: 12.1 K/UL — HIGH (ref 4.8–10.8)

## 2018-12-14 PROCEDURE — 99232 SBSQ HOSP IP/OBS MODERATE 35: CPT

## 2018-12-14 RX ADMIN — QUETIAPINE FUMARATE 25 MILLIGRAM(S): 200 TABLET, FILM COATED ORAL at 22:02

## 2018-12-14 RX ADMIN — Medication 3 MILLILITER(S): at 17:09

## 2018-12-14 RX ADMIN — BRIMONIDINE TARTRATE 1 DROP(S): 2 SOLUTION/ DROPS OPHTHALMIC at 12:48

## 2018-12-14 RX ADMIN — PANTOPRAZOLE SODIUM 40 MILLIGRAM(S): 20 TABLET, DELAYED RELEASE ORAL at 08:23

## 2018-12-14 RX ADMIN — TIOTROPIUM BROMIDE 1 CAPSULE(S): 18 CAPSULE ORAL; RESPIRATORY (INHALATION) at 10:16

## 2018-12-14 RX ADMIN — Medication 1 DROP(S): at 12:48

## 2018-12-14 RX ADMIN — LATANOPROST 1 DROP(S): 0.05 SOLUTION/ DROPS OPHTHALMIC; TOPICAL at 22:01

## 2018-12-14 RX ADMIN — IRON SUCROSE 110 MILLIGRAM(S): 20 INJECTION, SOLUTION INTRAVENOUS at 08:09

## 2018-12-14 NOTE — PROGRESS NOTE ADULT - SUBJECTIVE AND OBJECTIVE BOX
Patient: CHERELLE BEGUM [id number] 73y Female                           Internal Medicine Hospitalist Progress Note    Initial HPI:  73y Female PMH Chronic Afib not on ac due to risk of falls, COPD, PVD, Glaucoma, HLD, presents c/o gradually worsening SOB and fatigue x several weeks.  Noted Hgb 5.3 down from 11.8 in September 2018.  Denied bleeding nor changes in stool consistency.  Stool OB negative.  Transfused 3 units pRBCs.  Hgb has improved.  Pt now feeling baseline.  Seen with sister, niece at bedside.  No chest pain / palpitations. No SOB.  No additional complaints.    ____________________PHYSICAL EXAM:  Vitals reviewed as indicated below  GENERAL:  NAD Alert and Oriented x 3   HEENT: NCAT  CARDIOVASCULAR:  S1, S2  LUNGS: CTAB  ABDOMEN:  soft, (-) tenderness, (-) distension, (+) bowel sounds, (-) guarding, (-) rebound (-) rigidity  EXTREMITIES:  no cyanosis / clubbing / edema.   ____________________      BACKGROUND:  HEALTH ISSUES - PROBLEM Dx:        Allergies    Pen-V (Anaphylaxis)    Intolerances    SEND HEALTH SHAKE TID- RD OKAY (Unknown)    PAST MEDICAL & SURGICAL HISTORY:  Glaucoma  PVD (peripheral vascular disease)  COPD (chronic obstructive pulmonary disease)  Chronic atrial fibrillation  No significant past surgical history        VITALS:  Vital Signs Last 24 Hrs  T(C): 36.7 (14 Dec 2018 14:04), Max: 37.3 (14 Dec 2018 06:39)  T(F): 98.1 (14 Dec 2018 14:04), Max: 99.1 (14 Dec 2018 06:39)  HR: 79 (14 Dec 2018 14:04) (75 - 100)  BP: 142/84 (14 Dec 2018 14:04) (131/85 - 166/93)  BP(mean): --  RR: 18 (14 Dec 2018 14:04) (18 - 22)  SpO2: 98% (14 Dec 2018 14:04) (95% - 100%) Daily     Daily   CAPILLARY BLOOD GLUCOSE        I&O's Summary        LABS:                        10.2   12.1  )-----------( 645      ( 14 Dec 2018 07:39 )             33.3     12-14    140  |  104  |  10.0  ----------------------------<  96  4.5   |  23.0  |  0.45<L>    Ca    9.7      14 Dec 2018 07:39    TPro  6.9  /  Alb  4.2  /  TBili  0.2<L>  /  DBili  x   /  AST  19  /  ALT  10  /  AlkPhos  73  12-13    PT/INR - ( 13 Dec 2018 11:37 )   PT: 12.0 sec;   INR: 1.04 ratio         PTT - ( 13 Dec 2018 11:37 )  PTT:30.5 sec  LIVER FUNCTIONS - ( 13 Dec 2018 05:34 )  Alb: 4.2 g/dL / Pro: 6.9 g/dL / ALK PHOS: 73 U/L / ALT: 10 U/L / AST: 19 U/L / GGT: x             CARDIAC MARKERS ( 13 Dec 2018 05:34 )  x     / <0.01 ng/mL / x     / x     / x              MEDICATIONS:  MEDICATIONS  (STANDING):  ALBUTerol    0.083%. 2.5 milliGRAM(s) Nebulizer once  brimonidine 0.2% Ophthalmic Solution 1 Drop(s) Both EYES daily  iron sucrose IVPB 200 milliGRAM(s) IV Intermittent every 24 hours  latanoprost 0.005% Ophthalmic Solution 1 Drop(s) Both EYES at bedtime  pantoprazole    Tablet 40 milliGRAM(s) Oral before breakfast  QUEtiapine 25 milliGRAM(s) Oral at bedtime  timolol 0.5% Solution 1 Drop(s) Both EYES daily  tiotropium 18 MICROgram(s) Capsule 1 Capsule(s) Inhalation daily    MEDICATIONS  (PRN):  ALBUTerol/ipratropium for Nebulization 3 milliLiter(s) Nebulizer every 4 hours PRN Shortness of Breath and/or Wheezing

## 2018-12-14 NOTE — PROGRESS NOTE ADULT - ASSESSMENT
> Severe Iron Deficiency Anemia - Stool OB negative, no indication of acute blood loss.  Low retic count suggest underlying hematologic disorder.  Consult d/w Dr. Fagan.  I emphasized need for further outpatient workup regarding underlying etiology of anemia.  Pt and family in agreement.   > COPD - continue nebulizers prn.  Respiratory status appears to be baseline.  > Chronic Afib - Not on anticoagulation due to risk of falls.  Restart Diltiazem.    > PVD - no recent stenting / vascular intervention.  Resume antiplatelets.  > GLaucoma - continue eyedrops.  > DVT Prophylaxis - Lower extremity intermittent compression devices.

## 2018-12-15 DIAGNOSIS — N18.3 CHRONIC KIDNEY DISEASE, STAGE 3 (MODERATE): ICD-10-CM

## 2018-12-15 DIAGNOSIS — F29 UNSPECIFIED PSYCHOSIS NOT DUE TO A SUBSTANCE OR KNOWN PHYSIOLOGICAL CONDITION: ICD-10-CM

## 2018-12-15 DIAGNOSIS — I48.2 CHRONIC ATRIAL FIBRILLATION: ICD-10-CM

## 2018-12-15 DIAGNOSIS — J44.1 CHRONIC OBSTRUCTIVE PULMONARY DISEASE WITH (ACUTE) EXACERBATION: ICD-10-CM

## 2018-12-15 DIAGNOSIS — D72.829 ELEVATED WHITE BLOOD CELL COUNT, UNSPECIFIED: ICD-10-CM

## 2018-12-15 LAB
ANION GAP SERPL CALC-SCNC: 14 MMOL/L — SIGNIFICANT CHANGE UP (ref 5–17)
BUN SERPL-MCNC: 15 MG/DL — SIGNIFICANT CHANGE UP (ref 8–20)
CALCIUM SERPL-MCNC: 9.2 MG/DL — SIGNIFICANT CHANGE UP (ref 8.6–10.2)
CHLORIDE SERPL-SCNC: 104 MMOL/L — SIGNIFICANT CHANGE UP (ref 98–107)
CO2 SERPL-SCNC: 22 MMOL/L — SIGNIFICANT CHANGE UP (ref 22–29)
CREAT SERPL-MCNC: 0.6 MG/DL — SIGNIFICANT CHANGE UP (ref 0.5–1.3)
GLUCOSE SERPL-MCNC: 89 MG/DL — SIGNIFICANT CHANGE UP (ref 70–115)
HCT VFR BLD CALC: 33.7 % — LOW (ref 37–47)
HGB BLD-MCNC: 10.3 G/DL — LOW (ref 12–16)
MCHC RBC-ENTMCNC: 20.5 PG — LOW (ref 27–31)
MCHC RBC-ENTMCNC: 30.6 G/DL — LOW (ref 32–36)
MCV RBC AUTO: 67.1 FL — LOW (ref 81–99)
PLATELET # BLD AUTO: 725 K/UL — HIGH (ref 150–400)
POTASSIUM SERPL-MCNC: 2.9 MMOL/L — CRITICAL LOW (ref 3.5–5.3)
POTASSIUM SERPL-SCNC: 2.9 MMOL/L — CRITICAL LOW (ref 3.5–5.3)
RBC # BLD: 5.02 M/UL — SIGNIFICANT CHANGE UP (ref 4.4–5.2)
SODIUM SERPL-SCNC: 140 MMOL/L — SIGNIFICANT CHANGE UP (ref 135–145)
WBC # BLD: 11.3 K/UL — HIGH (ref 4.8–10.8)
WBC # FLD AUTO: 11.3 K/UL — HIGH (ref 4.8–10.8)

## 2018-12-15 PROCEDURE — 99232 SBSQ HOSP IP/OBS MODERATE 35: CPT

## 2018-12-15 RX ORDER — POTASSIUM CHLORIDE 20 MEQ
10 PACKET (EA) ORAL ONCE
Qty: 0 | Refills: 0 | Status: COMPLETED | OUTPATIENT
Start: 2018-12-15 | End: 2018-12-15

## 2018-12-15 RX ORDER — POTASSIUM CHLORIDE 20 MEQ
40 PACKET (EA) ORAL EVERY 4 HOURS
Qty: 0 | Refills: 0 | Status: COMPLETED | OUTPATIENT
Start: 2018-12-15 | End: 2018-12-15

## 2018-12-15 RX ORDER — TRAMADOL HYDROCHLORIDE 50 MG/1
50 TABLET ORAL EVERY 6 HOURS
Qty: 0 | Refills: 0 | Status: DISCONTINUED | OUTPATIENT
Start: 2018-12-15 | End: 2018-12-18

## 2018-12-15 RX ADMIN — TIOTROPIUM BROMIDE 1 CAPSULE(S): 18 CAPSULE ORAL; RESPIRATORY (INHALATION) at 09:28

## 2018-12-15 RX ADMIN — QUETIAPINE FUMARATE 25 MILLIGRAM(S): 200 TABLET, FILM COATED ORAL at 22:58

## 2018-12-15 RX ADMIN — Medication 100 MILLIEQUIVALENT(S): at 10:28

## 2018-12-15 RX ADMIN — BRIMONIDINE TARTRATE 1 DROP(S): 2 SOLUTION/ DROPS OPHTHALMIC at 17:37

## 2018-12-15 RX ADMIN — Medication 40 MILLIEQUIVALENT(S): at 17:38

## 2018-12-15 RX ADMIN — Medication 40 MILLIEQUIVALENT(S): at 10:28

## 2018-12-15 RX ADMIN — IRON SUCROSE 110 MILLIGRAM(S): 20 INJECTION, SOLUTION INTRAVENOUS at 09:04

## 2018-12-15 RX ADMIN — TRAMADOL HYDROCHLORIDE 50 MILLIGRAM(S): 50 TABLET ORAL at 15:03

## 2018-12-15 RX ADMIN — Medication 3 MILLILITER(S): at 09:30

## 2018-12-15 RX ADMIN — LATANOPROST 1 DROP(S): 0.05 SOLUTION/ DROPS OPHTHALMIC; TOPICAL at 23:02

## 2018-12-15 RX ADMIN — PANTOPRAZOLE SODIUM 40 MILLIGRAM(S): 20 TABLET, DELAYED RELEASE ORAL at 09:04

## 2018-12-15 RX ADMIN — Medication 40 MILLIEQUIVALENT(S): at 13:15

## 2018-12-15 RX ADMIN — TRAMADOL HYDROCHLORIDE 50 MILLIGRAM(S): 50 TABLET ORAL at 16:00

## 2018-12-15 NOTE — PROGRESS NOTE ADULT - SUBJECTIVE AND OBJECTIVE BOX
CHERELLE BEGUM     Chief Complaint: Patient is a 73y old  Female who presents with a chief complaint of SOB (14 Dec 2018 15:49)      PAST MEDICAL & SURGICAL HISTORY:  Glaucoma  PVD (peripheral vascular disease)  COPD (chronic obstructive pulmonary disease)  Chronic atrial fibrillation  No significant past surgical history      HPI/OVERNIGHT EVENTS: Patient lying in bed, slightly odd behavior. No chest Pain no shortness of breath.e    MEDICATIONS  (STANDING):  ALBUTerol    0.083%. 2.5 milliGRAM(s) Nebulizer once  brimonidine 0.2% Ophthalmic Solution 1 Drop(s) Both EYES daily  iron sucrose IVPB 200 milliGRAM(s) IV Intermittent every 24 hours  latanoprost 0.005% Ophthalmic Solution 1 Drop(s) Both EYES at bedtime  pantoprazole    Tablet 40 milliGRAM(s) Oral before breakfast  potassium chloride    Tablet ER 40 milliEquivalent(s) Oral every 4 hours  QUEtiapine 25 milliGRAM(s) Oral at bedtime  timolol 0.5% Solution 1 Drop(s) Both EYES daily  tiotropium 18 MICROgram(s) Capsule 1 Capsule(s) Inhalation daily      Vital Signs Last 24 Hrs  T(C): 36.7 (15 Dec 2018 09:11), Max: 37.6 (15 Dec 2018 05:31)  T(F): 98.1 (15 Dec 2018 09:11), Max: 99.7 (15 Dec 2018 05:31)  HR: 94 (15 Dec 2018 09:11) (79 - 94)  BP: 166/90 (15 Dec 2018 09:11) (134/84 - 166/90)  BP(mean): --  RR: 18 (15 Dec 2018 09:11) (18 - 18)  SpO2: 99% (15 Dec 2018 09:28) (94% - 99%)    PHYSICAL EXAM:  HEENT: PERRLA, EOMI, Normal Hearing  Neck: No LAD, No JVD  Back: No CVA tenderness  Respiratory: CTAB Cardiovascular: S1 and S2, RRR, no M/G/R  Gastrointestinal: BS+, soft, NT/ND  Extremities: No peripheral edema  Vascular: 2+ peripheral pulses  Neurological: A/O x 3, no focal deficits        CAPILLARY BLOOD GLUCOSE    LABS:                        10.3   11.3  )-----------( 725      ( 15 Dec 2018 07:55 )             33.7     12-15    140  |  104  |  15.0  ----------------------------<  89  2.9<LL>   |  22.0  |  0.60    Ca    9.2      15 Dec 2018 07:55            RADIOLOGY & ADDITIONAL TESTS:

## 2018-12-16 LAB
ANION GAP SERPL CALC-SCNC: 15 MMOL/L — SIGNIFICANT CHANGE UP (ref 5–17)
ANISOCYTOSIS BLD QL: SLIGHT — SIGNIFICANT CHANGE UP
BASOPHILS # BLD AUTO: 0.1 K/UL — SIGNIFICANT CHANGE UP (ref 0–0.2)
BASOPHILS NFR BLD AUTO: 0.9 % — SIGNIFICANT CHANGE UP (ref 0–2)
BUN SERPL-MCNC: 16 MG/DL — SIGNIFICANT CHANGE UP (ref 8–20)
CALCIUM SERPL-MCNC: 9.9 MG/DL — SIGNIFICANT CHANGE UP (ref 8.6–10.2)
CHLORIDE SERPL-SCNC: 103 MMOL/L — SIGNIFICANT CHANGE UP (ref 98–107)
CO2 SERPL-SCNC: 21 MMOL/L — LOW (ref 22–29)
CREAT SERPL-MCNC: 0.52 MG/DL — SIGNIFICANT CHANGE UP (ref 0.5–1.3)
ELLIPTOCYTES BLD QL SMEAR: SLIGHT — SIGNIFICANT CHANGE UP
EOSINOPHIL # BLD AUTO: 0.4 K/UL — SIGNIFICANT CHANGE UP (ref 0–0.5)
EOSINOPHIL NFR BLD AUTO: 2.9 % — SIGNIFICANT CHANGE UP (ref 0–6)
GLUCOSE SERPL-MCNC: 100 MG/DL — SIGNIFICANT CHANGE UP (ref 70–115)
HCT VFR BLD CALC: 35.7 % — LOW (ref 37–47)
HGB BLD-MCNC: 11.1 G/DL — LOW (ref 12–16)
HYPOCHROMIA BLD QL: SLIGHT — SIGNIFICANT CHANGE UP
LYMPHOCYTES # BLD AUTO: 2.6 K/UL — SIGNIFICANT CHANGE UP (ref 1–4.8)
LYMPHOCYTES # BLD AUTO: 20.3 % — SIGNIFICANT CHANGE UP (ref 20–55)
MACROCYTES BLD QL: SLIGHT — SIGNIFICANT CHANGE UP
MAGNESIUM SERPL-MCNC: 1.9 MG/DL — SIGNIFICANT CHANGE UP (ref 1.6–2.6)
MCHC RBC-ENTMCNC: 21.6 PG — LOW (ref 27–31)
MCHC RBC-ENTMCNC: 31.1 G/DL — LOW (ref 32–36)
MCV RBC AUTO: 69.6 FL — LOW (ref 81–99)
MICROCYTES BLD QL: SLIGHT — SIGNIFICANT CHANGE UP
MONOCYTES # BLD AUTO: 1 K/UL — HIGH (ref 0–0.8)
MONOCYTES NFR BLD AUTO: 7.6 % — SIGNIFICANT CHANGE UP (ref 3–10)
NEUTROPHILS # BLD AUTO: 8.5 K/UL — HIGH (ref 1.8–8)
NEUTROPHILS NFR BLD AUTO: 67.8 % — SIGNIFICANT CHANGE UP (ref 37–73)
OVALOCYTES BLD QL SMEAR: SLIGHT — SIGNIFICANT CHANGE UP
PHOSPHATE SERPL-MCNC: 3.5 MG/DL — SIGNIFICANT CHANGE UP (ref 2.4–4.7)
PLAT MORPH BLD: NORMAL — SIGNIFICANT CHANGE UP
PLATELET # BLD AUTO: 678 K/UL — HIGH (ref 150–400)
POIKILOCYTOSIS BLD QL AUTO: SLIGHT — SIGNIFICANT CHANGE UP
POLYCHROMASIA BLD QL SMEAR: SLIGHT — SIGNIFICANT CHANGE UP
POTASSIUM SERPL-MCNC: 4.1 MMOL/L — SIGNIFICANT CHANGE UP (ref 3.5–5.3)
POTASSIUM SERPL-SCNC: 4.1 MMOL/L — SIGNIFICANT CHANGE UP (ref 3.5–5.3)
RBC # BLD: 5.13 M/UL — SIGNIFICANT CHANGE UP (ref 4.4–5.2)
RBC BLD AUTO: ABNORMAL
SODIUM SERPL-SCNC: 139 MMOL/L — SIGNIFICANT CHANGE UP (ref 135–145)
WBC # BLD: 12.6 K/UL — HIGH (ref 4.8–10.8)
WBC # FLD AUTO: 12.6 K/UL — HIGH (ref 4.8–10.8)

## 2018-12-16 PROCEDURE — 99233 SBSQ HOSP IP/OBS HIGH 50: CPT

## 2018-12-16 RX ADMIN — BRIMONIDINE TARTRATE 1 DROP(S): 2 SOLUTION/ DROPS OPHTHALMIC at 16:39

## 2018-12-16 RX ADMIN — IRON SUCROSE 110 MILLIGRAM(S): 20 INJECTION, SOLUTION INTRAVENOUS at 17:29

## 2018-12-16 RX ADMIN — PANTOPRAZOLE SODIUM 40 MILLIGRAM(S): 20 TABLET, DELAYED RELEASE ORAL at 07:08

## 2018-12-16 RX ADMIN — Medication 1 DROP(S): at 21:44

## 2018-12-16 RX ADMIN — LATANOPROST 1 DROP(S): 0.05 SOLUTION/ DROPS OPHTHALMIC; TOPICAL at 21:45

## 2018-12-16 RX ADMIN — QUETIAPINE FUMARATE 25 MILLIGRAM(S): 200 TABLET, FILM COATED ORAL at 21:46

## 2018-12-16 NOTE — PROGRESS NOTE ADULT - SUBJECTIVE AND OBJECTIVE BOX
CHERELLE BEGUM     Chief Complaint: Patient is a 73y old  Female who presents with a chief complaint of SOB (15 Dec 2018 13:33)      PAST MEDICAL & SURGICAL HISTORY:  Glaucoma  PVD (peripheral vascular disease)  COPD (chronic obstructive pulmonary disease)  Chronic atrial fibrillation  No significant past surgical history      HPI/OVERNIGHT EVENTS: Patient lying in bed using bed pan. She is confused and I spoke to her     MEDICATIONS  (STANDING):  brimonidine 0.2% Ophthalmic Solution 1 Drop(s) Both EYES daily  iron sucrose IVPB 200 milliGRAM(s) IV Intermittent every 24 hours  latanoprost 0.005% Ophthalmic Solution 1 Drop(s) Both EYES at bedtime  pantoprazole    Tablet 40 milliGRAM(s) Oral before breakfast  QUEtiapine 25 milliGRAM(s) Oral at bedtime  timolol 0.5% Solution 1 Drop(s) Both EYES daily  tiotropium 18 MICROgram(s) Capsule 1 Capsule(s) Inhalation daily      Vital Signs Last 24 Hrs  T(C): 36.4 (16 Dec 2018 08:09), Max: 36.7 (15 Dec 2018 22:12)  T(F): 97.6 (16 Dec 2018 08:09), Max: 98 (15 Dec 2018 22:12)  HR: 101 (16 Dec 2018 08:09) (70 - 101)  BP: 134/74 (16 Dec 2018 00:45) (134/74 - 148/91)  BP(mean): --  RR: 17 (16 Dec 2018 08:09) (17 - 18)  SpO2: 94% (16 Dec 2018 08:09) (93% - 98%)    PHYSICAL EXAM:  HEENT: PERRLA, EOMI, Normal Hearing  Neck: No LAD, No JVD  Back: No CVA tenderness  Respiratory: CTAB Cardiovascular: S1 and S2, RRR, no M/G/R  Gastrointestinal: BS+, soft, NT/ND  Extremities: No peripheral edema  Vascular: 2+ peripheral pulses  Neurological: A/O x 1        CAPILLARY BLOOD GLUCOSE    LABS:                        10.3   11.3  )-----------( 725      ( 15 Dec 2018 07:55 )             33.7     12-16    139  |  103  |  16.0  ----------------------------<  100  4.1   |  21.0<L>  |  0.52    Ca    9.9      16 Dec 2018 12:13  Phos  3.5     12-16  Mg     1.9     12-16            RADIOLOGY & ADDITIONAL TESTS: CHERELLE BEGUM     Chief Complaint: Patient is a 73y old  Female who presents with a chief complaint of SOB (15 Dec 2018 13:33)      PAST MEDICAL & SURGICAL HISTORY:  Glaucoma  PVD (peripheral vascular disease)  COPD (chronic obstructive pulmonary disease)  Chronic atrial fibrillation  No significant past surgical history      HPI/OVERNIGHT EVENTS: Patient lying in bed using bed pan. She is confused and I spoke to her . Consult hematology for anemia, I called Dr Young and requested a consult.    MEDICATIONS  (STANDING):  brimonidine 0.2% Ophthalmic Solution 1 Drop(s) Both EYES daily  iron sucrose IVPB 200 milliGRAM(s) IV Intermittent every 24 hours  latanoprost 0.005% Ophthalmic Solution 1 Drop(s) Both EYES at bedtime  pantoprazole    Tablet 40 milliGRAM(s) Oral before breakfast  QUEtiapine 25 milliGRAM(s) Oral at bedtime  timolol 0.5% Solution 1 Drop(s) Both EYES daily  tiotropium 18 MICROgram(s) Capsule 1 Capsule(s) Inhalation daily      Vital Signs Last 24 Hrs  T(C): 36.4 (16 Dec 2018 08:09), Max: 36.7 (15 Dec 2018 22:12)  T(F): 97.6 (16 Dec 2018 08:09), Max: 98 (15 Dec 2018 22:12)  HR: 101 (16 Dec 2018 08:09) (70 - 101)  BP: 134/74 (16 Dec 2018 00:45) (134/74 - 148/91)  BP(mean): --  RR: 17 (16 Dec 2018 08:09) (17 - 18)  SpO2: 94% (16 Dec 2018 08:09) (93% - 98%)    PHYSICAL EXAM:  HEENT: PERRLA, EOMI, Normal Hearing  Neck: No LAD, No JVD  Back: No CVA tenderness  Respiratory: CTAB Cardiovascular: S1 and S2, RRR, no M/G/R  Gastrointestinal: BS+, soft, NT/ND  Extremities: No peripheral edema  Vascular: 2+ peripheral pulses  Neurological: A/O x 1        CAPILLARY BLOOD GLUCOSE    LABS:                        10.3   11.3  )-----------( 725      ( 15 Dec 2018 07:55 )             33.7     12-16    139  |  103  |  16.0  ----------------------------<  100  4.1   |  21.0<L>  |  0.52    Ca    9.9      16 Dec 2018 12:13  Phos  3.5     12-16  Mg     1.9     12-16            RADIOLOGY & ADDITIONAL TESTS:

## 2018-12-16 NOTE — PROGRESS NOTE ADULT - PROBLEM SELECTOR PLAN 5
No obvioud source, chest x ray clear, Assess urinalysis No obvious source, chest x ray clear, Assess urinalysis

## 2018-12-16 NOTE — PROGRESS NOTE ADULT - ASSESSMENT
73 year old female COPD, A fib, Anemia, psychosis. Patient was admitted with a hemoglobin 73 year old female COPD, A fib, Anemia, psychosis. Patient was admitted with a hemoglobin of five and received multiple transfusions. On 12/16 She is awake and alert but confused. Her  told me this is her baseline.

## 2018-12-16 NOTE — PROGRESS NOTE ADULT - PROBLEM SELECTOR PLAN 4
Not on anticoagulation secondary to bleeding risk.
Not on anticoagulation secondary to bleeding risk.

## 2018-12-17 ENCOUNTER — CLINICAL ADVICE (OUTPATIENT)
Age: 73
End: 2018-12-17

## 2018-12-17 DIAGNOSIS — D47.3 ESSENTIAL (HEMORRHAGIC) THROMBOCYTHEMIA: ICD-10-CM

## 2018-12-17 DIAGNOSIS — D50.9 IRON DEFICIENCY ANEMIA, UNSPECIFIED: ICD-10-CM

## 2018-12-17 LAB — GLUCOSE BLDC GLUCOMTR-MCNC: 101 MG/DL — HIGH (ref 70–99)

## 2018-12-17 PROCEDURE — 99232 SBSQ HOSP IP/OBS MODERATE 35: CPT

## 2018-12-17 RX ADMIN — TRAMADOL HYDROCHLORIDE 50 MILLIGRAM(S): 50 TABLET ORAL at 15:37

## 2018-12-17 RX ADMIN — QUETIAPINE FUMARATE 25 MILLIGRAM(S): 200 TABLET, FILM COATED ORAL at 22:04

## 2018-12-17 RX ADMIN — PANTOPRAZOLE SODIUM 40 MILLIGRAM(S): 20 TABLET, DELAYED RELEASE ORAL at 05:23

## 2018-12-17 RX ADMIN — LATANOPROST 1 DROP(S): 0.05 SOLUTION/ DROPS OPHTHALMIC; TOPICAL at 22:04

## 2018-12-17 RX ADMIN — TIOTROPIUM BROMIDE 1 CAPSULE(S): 18 CAPSULE ORAL; RESPIRATORY (INHALATION) at 15:47

## 2018-12-17 RX ADMIN — TRAMADOL HYDROCHLORIDE 50 MILLIGRAM(S): 50 TABLET ORAL at 12:20

## 2018-12-17 RX ADMIN — IRON SUCROSE 110 MILLIGRAM(S): 20 INJECTION, SOLUTION INTRAVENOUS at 09:12

## 2018-12-17 RX ADMIN — Medication 1 DROP(S): at 11:28

## 2018-12-17 RX ADMIN — BRIMONIDINE TARTRATE 1 DROP(S): 2 SOLUTION/ DROPS OPHTHALMIC at 11:28

## 2018-12-17 NOTE — PHYSICAL THERAPY INITIAL EVALUATION ADULT - IMPAIRMENTS CONTRIBUTING TO GAIT DEVIATIONS, PT EVAL
ataxic/impaired postural control/impaired motor control/impaired balance/cognition/decreased strength

## 2018-12-17 NOTE — CONSULT NOTE ADULT - SUBJECTIVE AND OBJECTIVE BOX
[facility] [address] [address]                                                                                                          [phone]                                                                   Livier Kelley, David Keating, Avril Calix and Anil Eden        HPI:  73y Female PMH Chronic Afib not on ac due to risk of falls, COPD, PVD, Glaucoma, HLD, presents c/o gradually worsening SOB and fatigue x several weeks.  Symptoms began gradually, progressive, worse with exertion.  Denies wheezing, states her current symptoms are not like prior COPD exacerbations.  Noted Hgb 5.3 down from 11.8 in September 2018.  Denies bleeding nor changes in stool consistency.  Daughter at bedside at time of admission states pt had been taken off anticoagulation for Afib due to frequent falls.  No additional complaints. No h/o thalassemia / hematologic disorders in mother, father.     Intolerances    Allergies    Pen-V (Anaphylaxis)    Intolerances    MEDICATIONS  (STANDING):  brimonidine 0.2% Ophthalmic Solution 1 Drop(s) Both EYES daily  iron sucrose IVPB 200 milliGRAM(s) IV Intermittent every 24 hours  latanoprost 0.005% Ophthalmic Solution 1 Drop(s) Both EYES at bedtime  pantoprazole    Tablet 40 milliGRAM(s) Oral before breakfast  QUEtiapine 25 milliGRAM(s) Oral at bedtime  timolol 0.5% Solution 1 Drop(s) Both EYES daily  tiotropium 18 MICROgram(s) Capsule 1 Capsule(s) Inhalation daily    MEDICATIONS  (PRN):  ALBUTerol/ipratropium for Nebulization 3 milliLiter(s) Nebulizer every 4 hours PRN Shortness of Breath and/or Wheezing  traMADol 50 milliGRAM(s) Oral every 6 hours PRN Moderate Pain (4 - 6)      PAST MEDICAL & SURGICAL HISTORY:  Glaucoma  PVD (peripheral vascular disease)  COPD (chronic obstructive pulmonary disease)  Chronic atrial fibrillation  No significant past surgical history    FAMILY HISTORY:  No pertinent family history in first degree relatives    SOCIAL HISTORY: No EtOH, no tobacco    REVIEW OF SYSTEMS:    CONSTITUTIONAL:  +weakness and fatigue, no chills  EYES/ENT: No visual changes;  No vertigo or throat pain   NECK: No pain or stiffness  RESPIRATORY: No cough, wheezing, hemoptysis; + shortness of breath  CARDIOVASCULAR: No chest pain or palpitations  GASTROINTESTINAL: No abdominal or epigastric pain. No nausea, vomiting, or hematemesis; No diarrhea or constipation. No melena or hematochezia.  GENITOURINARY: No dysuria, frequency or hematuria  NEUROLOGICAL: No numbness or weakness  SKIN: No itching, burning, rashes, or lesions   All other review of systems is negative unless indicated above.        T(F): 98.8 (12-17-18 @ 00:45), Max: 98.8 (12-17-18 @ 00:45)  HR: 80 (12-17-18 @ 00:45)  BP: 114/76 (12-17-18 @ 00:45)  RR: 18 (12-17-18 @ 00:45)  SpO2: 98% (12-17-18 @ 00:45)  Wt(kg): --    GENERAL: NAD, well-developed  HEAD:  Atraumatic, Normocephalic  EYES: EOMI, PERRLA, conjunctiva and sclera clear  NECK: Supple, No JVD  CHEST/LUNG: Clear to auscultation bilaterally; No wheeze  HEART: Regular rate and rhythm; No murmurs, rubs, or gallops  ABDOMEN: Soft, Nontender, Nondistended; Bowel sounds present  EXTREMITIES:  2+ Peripheral Pulses, No clubbing, cyanosis, or edema  NEUROLOGY: non-focal  SKIN: No rashes or lesions                          11.1   12.6  )-----------( 678      ( 16 Dec 2018 12:13 )             35.7       12-16    139  |  103  |  16.0  ----------------------------<  100  4.1   |  21.0<L>  |  0.52    Ca    9.9      16 Dec 2018 12:13  Phos  3.5     12-16  Mg     1.9     12-16    Iron with Total Binding Capacity (12.13.18 @ 11:37)    % Saturation, Iron: 5 %    Iron - Total Binding Capacity.: 448 ug/dL    Iron Total, Serum: 21 ug/dL    Ferritin, Serum (12.13.18 @ 11:37)    Ferritin, Serum: 8 ng/mL        Magnesium, Serum: 1.9 mg/dL (12-16 @ 12:13)  Phosphorus Level, Serum: 3.5 mg/dL (12-16 @ 12:13)

## 2018-12-17 NOTE — PHYSICAL THERAPY INITIAL EVALUATION ADULT - ADDITIONAL COMMENTS
Pt is a poor historian, states she lives with her  (per RN, pt's daughter also lives with her) Pt unwilling to state if she has steps to enter. Pt states she was independent PTA with a RW. Unknown if pt has other DME.

## 2018-12-17 NOTE — PROGRESS NOTE ADULT - ASSESSMENT
73y Female PMH Chronic Afib not on ac due to risk of falls, COPD, PVD, Glaucoma, HLD, presents c/o gradually worsening SOB and fatigue x several weeks.  Symptoms began gradually, progressive, worse with exertion.  Denies wheezing, states her current symptoms are not like prior COPD exacer	bations.  Noted Hgb 5.3 down from 11.8 in September 2018.  Denies bleeding nor changes in stool consistency.  Daughter at bedside states pt had been taken off anticoagulation for Afib due to frequent falls.  No additional complaints.     1) Symptomatic Anemia  - Likely secondary to Iron Deficiency Anemia  - s/p PRBCs transfusion  - Getting IV Venofrer  - Hem Consult appreciated  - Will call GI unclear etiology  2) COPD  - continue Spiriva and Nebulizers prn  3) Chronic Afib -   - Start Diltiazem  - Not on AC due to fall risk   DVT Prophylaxis - IPC    Dispo: [facility] 73y Female PMH Chronic Afib not on ac due to risk of falls, COPD, PVD, Glaucoma, HLD, presents c/o gradually worsening SOB and fatigue x several weeks.  Symptoms began gradually, progressive, worse with exertion.  Denies wheezing, states her current symptoms are not like prior COPD exacer	bations.  Noted Hgb 5.3 down from 11.8 in September 2018.  Denies bleeding nor changes in stool consistency.  Daughter at bedside states pt had been taken off anticoagulation for Afib due to frequent falls.  No additional complaints.     1) Symptomatic Anemia  - Likely secondary to Iron Deficiency Anemia  - s/p PRBCs transfusion  - Getting IV Venofrer  - Hem Consult appreciated  - Will call GI unclear etiology  2) COPD  - continue Spiriva and Nebulizers prn  3) Chronic Afib -   - Start Diltiazem  - Not on AC due to fall risk   4) Leukocytosis and Thrombocytosis  - Likely reactive  DVT Prophylaxis - IPC    Dispo: [facility] 73y Female PMH Chronic Afib not on ac due to risk of falls, COPD, PVD, Glaucoma, HLD, presents c/o gradually worsening SOB and fatigue x several weeks.  Symptoms began gradually, progressive, worse with exertion.  Denies wheezing, states her current symptoms are not like prior COPD exacer	bations.  Noted Hgb 5.3 down from 11.8 in September 2018.  Denies bleeding nor changes in stool consistency.  Daughter at bedside states pt had been taken off anticoagulation for Afib due to frequent falls.  No additional complaints.     1) Symptomatic Anemia  - Likely secondary to Iron Deficiency Anemia  - s/p PRBCs transfusion  - Getting IV Venofrer  - Hem Consult appreciated  - Will call GI unclear etiology  2) COPD  - continue Spiriva and Nebulizers prn  3) Chronic Afib    - Start Diltiazem  - Not on AC due to fall risk   4) Leukocytosis and Thrombocytosis  - Likely reactive  DVT Prophylaxis - IPC    Dispo: [facility]

## 2018-12-17 NOTE — PHYSICAL THERAPY INITIAL EVALUATION ADULT - PERTINENT HX OF CURRENT PROBLEM, REHAB EVAL
73 year old female COPD, A fib, Anemia, psychosis. Patient was admitted with a hemoglobin of five and received multiple transfusions

## 2018-12-17 NOTE — CONSULT NOTE ADULT - PROBLEM SELECTOR RECOMMENDATION 3
-Unclear etiology at present. Continue to monitor, no signs or symptoms of infection at present. May be reactive.

## 2018-12-17 NOTE — PROGRESS NOTE ADULT - SUBJECTIVE AND OBJECTIVE BOX
Chronic obstructive pulmonary disease with acute exacerbation      HPI:  73y Female PMH Chronic Afib not on ac due to risk of falls, COPD, PVD, Glaucoma, HLD, presents c/o gradually worsening SOB and fatigue x several weeks.  Symptoms began gradually, progressive, worse with exertion.  Denies wheezing, states her current symptoms are not like prior COPD exacer	bations.  Noted Hgb 5.3 down from 11.8 in September 2018.  Denies bleeding nor changes in stool consistency.  Daughter at bedside states pt had been taken off anticoagulation for Afib due to frequent falls.  No additional complaints.     FAMILY HISTORY: No h/o thalassemia / hematologic disorders in mother, father.   SOCIAL HISTORY: - alcohol, - IVDA, + smoking quit  REVIEW OF SYSTEMS: General: + fatigue, - weight loss, - fevers, - chills.  HEENT: - headache, - hearing disturbances.  EYES: - visual disturbances, - diplopia.  CARDIOVASCULAR: - chest pain, - palpitations.  PULMONARY: + SOB, - cough, - hemoptysis.  GI: - abdominal pain, - nausea, - vomiting, - diarrhea, - constipation.  : - urinary urgency, - frequency, - dysuria.  MUSCULOSKELETAL: - arthralgias, - myalgias.  NEURO:  - weakness, - numbness.  PSYCH: - depression, - anxiety, - suicidal thoughts. SKIN: - rashes, - lesions.  All remaining ROS are negative.Allergies    Pen-V (Anaphylaxis)    Intolerances  SEND HEALTH SHAKE TID- RD OKAY (Unknown) (13 Dec 2018 08:47)      Interval History:  Patient was seen and examined at bedside. Feeling better.   Denies chest pain, palpitations, shortness of breath, headache, dizziness, visual symptoms, nausea, vomiting or abdominal pain.  No overnight issues.    ROS:  As per interval history otherwise unremarkable.    PHYSICAL EXAM:  Vital Signs   T(C): 37.1 (17 Dec 2018 00:45), Max: 37.1 (17 Dec 2018 00:45)  T(F): 98.8 (17 Dec 2018 00:45), Max: 98.8 (17 Dec 2018 00:45)  HR: 80 (17 Dec 2018 00:45) (80 - 101)  BP: 114/76 (17 Dec 2018 00:45) (114/76 - 161/90)  RR: 18 (17 Dec 2018 00:45) (18 - 18)  SpO2: 98% (17 Dec 2018 00:45) (94% - 98%)  General: Elderly female lying in bed comfortably. No acute distress  HEENT: PERRLA. EOMI. Clear conjunctivae. Moist mucus membrane  Neck: Supple. No JVD.   Chest: CTA bilaterally - no wheezing, rales or rhonchi.   Heart: S1 & S2 with irregular rhythm.    Abdomen: Soft. Non-tender. Non-distended. + BS  Ext: No pedal edema. No calf tenderness   Neuro: Active and alert. No focal deficit. No speech disorder  Skin: Warm and Dry  Psychiatry: Normal mood and affect    MEDICATIONS  (STANDING):  brimonidine 0.2% Ophthalmic Solution 1 Drop(s) Both EYES daily  iron sucrose IVPB 200 milliGRAM(s) IV Intermittent every 24 hours  latanoprost 0.005% Ophthalmic Solution 1 Drop(s) Both EYES at bedtime  pantoprazole    Tablet 40 milliGRAM(s) Oral before breakfast  QUEtiapine 25 milliGRAM(s) Oral at bedtime  timolol 0.5% Solution 1 Drop(s) Both EYES daily  tiotropium 18 MICROgram(s) Capsule 1 Capsule(s) Inhalation daily    MEDICATIONS  (PRN):  ALBUTerol/ipratropium for Nebulization 3 milliLiter(s) Nebulizer every 4 hours PRN Shortness of Breath and/or Wheezing  traMADol 50 milliGRAM(s) Oral every 6 hours PRN Moderate Pain (4 - 6)      LABS:  CAPILLARY BLOOD GLUCOSE                        11.1   12.6  )-----------( 678      ( 16 Dec 2018 12:13 )             35.7     12-16    139  |  103  |  16.0  ----------------------------<  100  4.1   |  21.0<L>  |  0.52    Ca    9.9      16 Dec 2018 12:13  Phos  3.5     12-16  Mg     1.9     12-16    RADIOLOGY & ADDITIONAL STUDIES:  Reviewed

## 2018-12-17 NOTE — CONSULT NOTE ADULT - PROBLEM SELECTOR RECOMMENDATION 9
-Severely iron deficient with ferritin of 8. Pt not taking AC due to risk of falls, but GI blood losses remain highest on differential regarding etiology. Pt states she has never had a screening colonoscopy. Eats well rounded diet. Agree with iron sucrose to replenish stores.  -Recommend GI evaluation for screening colonoscopy and/or EGD as necessary  -Outpatient follow-up to continue repletion and further work-up

## 2018-12-17 NOTE — CONSULT NOTE ADULT - PROBLEM SELECTOR RECOMMENDATION 2
-Likely reactive in the setting of iron deficiency and anemia. Expect improvement with correction of anemia.

## 2018-12-17 NOTE — PHYSICAL THERAPY INITIAL EVALUATION ADULT - IMPAIRMENTS FOUND, PT EVAL
poor safety awareness/aerobic capacity/endurance/muscle strength/gait, locomotion, and balance/posture

## 2018-12-18 ENCOUNTER — TRANSCRIPTION ENCOUNTER (OUTPATIENT)
Age: 73
End: 2018-12-18

## 2018-12-18 VITALS
OXYGEN SATURATION: 96 % | DIASTOLIC BLOOD PRESSURE: 65 MMHG | RESPIRATION RATE: 18 BRPM | SYSTOLIC BLOOD PRESSURE: 100 MMHG | HEART RATE: 86 BPM | TEMPERATURE: 98 F

## 2018-12-18 LAB
ANION GAP SERPL CALC-SCNC: 15 MMOL/L — SIGNIFICANT CHANGE UP (ref 5–17)
ANISOCYTOSIS BLD QL: SLIGHT — SIGNIFICANT CHANGE UP
BASOPHILS # BLD AUTO: 0.1 K/UL — SIGNIFICANT CHANGE UP (ref 0–0.2)
BASOPHILS NFR BLD AUTO: 0.5 % — SIGNIFICANT CHANGE UP (ref 0–2)
BUN SERPL-MCNC: 24 MG/DL — HIGH (ref 8–20)
CALCIUM SERPL-MCNC: 9.1 MG/DL — SIGNIFICANT CHANGE UP (ref 8.6–10.2)
CHLORIDE SERPL-SCNC: 104 MMOL/L — SIGNIFICANT CHANGE UP (ref 98–107)
CO2 SERPL-SCNC: 22 MMOL/L — SIGNIFICANT CHANGE UP (ref 22–29)
CREAT SERPL-MCNC: 0.65 MG/DL — SIGNIFICANT CHANGE UP (ref 0.5–1.3)
CULTURE RESULTS: SIGNIFICANT CHANGE UP
CULTURE RESULTS: SIGNIFICANT CHANGE UP
ELLIPTOCYTES BLD QL SMEAR: SLIGHT — SIGNIFICANT CHANGE UP
EOSINOPHIL # BLD AUTO: 0.8 K/UL — HIGH (ref 0–0.5)
EOSINOPHIL NFR BLD AUTO: 4.9 % — SIGNIFICANT CHANGE UP (ref 0–6)
GLUCOSE SERPL-MCNC: 100 MG/DL — SIGNIFICANT CHANGE UP (ref 70–115)
HCT VFR BLD CALC: 38.1 % — SIGNIFICANT CHANGE UP (ref 37–47)
HGB BLD-MCNC: 11.6 G/DL — LOW (ref 12–16)
HYPOCHROMIA BLD QL: SLIGHT — SIGNIFICANT CHANGE UP
LYMPHOCYTES # BLD AUTO: 17.8 % — LOW (ref 20–55)
LYMPHOCYTES # BLD AUTO: 2.9 K/UL — SIGNIFICANT CHANGE UP (ref 1–4.8)
MACROCYTES BLD QL: SLIGHT — SIGNIFICANT CHANGE UP
MAGNESIUM SERPL-MCNC: 2.2 MG/DL — SIGNIFICANT CHANGE UP (ref 1.6–2.6)
MCHC RBC-ENTMCNC: 21.6 PG — LOW (ref 27–31)
MCHC RBC-ENTMCNC: 30.4 G/DL — LOW (ref 32–36)
MCV RBC AUTO: 71.1 FL — LOW (ref 81–99)
MICROCYTES BLD QL: SLIGHT — SIGNIFICANT CHANGE UP
MONOCYTES # BLD AUTO: 1.4 K/UL — HIGH (ref 0–0.8)
MONOCYTES NFR BLD AUTO: 8.6 % — SIGNIFICANT CHANGE UP (ref 3–10)
NEUTROPHILS # BLD AUTO: 11 K/UL — HIGH (ref 1.8–8)
NEUTROPHILS NFR BLD AUTO: 67.9 % — SIGNIFICANT CHANGE UP (ref 37–73)
OVALOCYTES BLD QL SMEAR: SLIGHT — SIGNIFICANT CHANGE UP
PLAT MORPH BLD: NORMAL — SIGNIFICANT CHANGE UP
PLATELET # BLD AUTO: 642 K/UL — HIGH (ref 150–400)
POIKILOCYTOSIS BLD QL AUTO: SLIGHT — SIGNIFICANT CHANGE UP
POLYCHROMASIA BLD QL SMEAR: SLIGHT — SIGNIFICANT CHANGE UP
POTASSIUM SERPL-MCNC: 3.3 MMOL/L — LOW (ref 3.5–5.3)
POTASSIUM SERPL-SCNC: 3.3 MMOL/L — LOW (ref 3.5–5.3)
RBC # BLD: 5.36 M/UL — HIGH (ref 4.4–5.2)
RBC BLD AUTO: ABNORMAL
SCHISTOCYTES BLD QL AUTO: SLIGHT — SIGNIFICANT CHANGE UP
SODIUM SERPL-SCNC: 141 MMOL/L — SIGNIFICANT CHANGE UP (ref 135–145)
SPECIMEN SOURCE: SIGNIFICANT CHANGE UP
SPECIMEN SOURCE: SIGNIFICANT CHANGE UP
WBC # BLD: 16.3 K/UL — HIGH (ref 4.8–10.8)
WBC # FLD AUTO: 16.3 K/UL — HIGH (ref 4.8–10.8)

## 2018-12-18 PROCEDURE — 87040 BLOOD CULTURE FOR BACTERIA: CPT

## 2018-12-18 PROCEDURE — 85045 AUTOMATED RETICULOCYTE COUNT: CPT

## 2018-12-18 PROCEDURE — 86901 BLOOD TYPING SEROLOGIC RH(D): CPT

## 2018-12-18 PROCEDURE — 84100 ASSAY OF PHOSPHORUS: CPT

## 2018-12-18 PROCEDURE — 99223 1ST HOSP IP/OBS HIGH 75: CPT

## 2018-12-18 PROCEDURE — 82607 VITAMIN B-12: CPT

## 2018-12-18 PROCEDURE — 82746 ASSAY OF FOLIC ACID SERUM: CPT

## 2018-12-18 PROCEDURE — 83735 ASSAY OF MAGNESIUM: CPT

## 2018-12-18 PROCEDURE — 83880 ASSAY OF NATRIURETIC PEPTIDE: CPT

## 2018-12-18 PROCEDURE — 36415 COLL VENOUS BLD VENIPUNCTURE: CPT

## 2018-12-18 PROCEDURE — 85730 THROMBOPLASTIN TIME PARTIAL: CPT

## 2018-12-18 PROCEDURE — 83550 IRON BINDING TEST: CPT

## 2018-12-18 PROCEDURE — 80053 COMPREHEN METABOLIC PANEL: CPT

## 2018-12-18 PROCEDURE — 36430 TRANSFUSION BLD/BLD COMPNT: CPT

## 2018-12-18 PROCEDURE — 86850 RBC ANTIBODY SCREEN: CPT

## 2018-12-18 PROCEDURE — 87486 CHLMYD PNEUM DNA AMP PROBE: CPT

## 2018-12-18 PROCEDURE — 83540 ASSAY OF IRON: CPT

## 2018-12-18 PROCEDURE — 85610 PROTHROMBIN TIME: CPT

## 2018-12-18 PROCEDURE — 86923 COMPATIBILITY TEST ELECTRIC: CPT

## 2018-12-18 PROCEDURE — 86900 BLOOD TYPING SEROLOGIC ABO: CPT

## 2018-12-18 PROCEDURE — 99239 HOSP IP/OBS DSCHRG MGMT >30: CPT

## 2018-12-18 PROCEDURE — 85027 COMPLETE CBC AUTOMATED: CPT

## 2018-12-18 PROCEDURE — 93005 ELECTROCARDIOGRAM TRACING: CPT

## 2018-12-18 PROCEDURE — 96375 TX/PRO/DX INJ NEW DRUG ADDON: CPT

## 2018-12-18 PROCEDURE — 80048 BASIC METABOLIC PNL TOTAL CA: CPT

## 2018-12-18 PROCEDURE — 84466 ASSAY OF TRANSFERRIN: CPT

## 2018-12-18 PROCEDURE — 83605 ASSAY OF LACTIC ACID: CPT

## 2018-12-18 PROCEDURE — 71045 X-RAY EXAM CHEST 1 VIEW: CPT

## 2018-12-18 PROCEDURE — 82272 OCCULT BLD FECES 1-3 TESTS: CPT

## 2018-12-18 PROCEDURE — 87633 RESP VIRUS 12-25 TARGETS: CPT

## 2018-12-18 PROCEDURE — 94640 AIRWAY INHALATION TREATMENT: CPT

## 2018-12-18 PROCEDURE — 82962 GLUCOSE BLOOD TEST: CPT

## 2018-12-18 PROCEDURE — 87798 DETECT AGENT NOS DNA AMP: CPT

## 2018-12-18 PROCEDURE — 97163 PT EVAL HIGH COMPLEX 45 MIN: CPT

## 2018-12-18 PROCEDURE — 82728 ASSAY OF FERRITIN: CPT

## 2018-12-18 PROCEDURE — 99291 CRITICAL CARE FIRST HOUR: CPT | Mod: 25

## 2018-12-18 PROCEDURE — 84484 ASSAY OF TROPONIN QUANT: CPT

## 2018-12-18 PROCEDURE — 96374 THER/PROPH/DIAG INJ IV PUSH: CPT

## 2018-12-18 PROCEDURE — 83690 ASSAY OF LIPASE: CPT

## 2018-12-18 PROCEDURE — 87581 M.PNEUMON DNA AMP PROBE: CPT

## 2018-12-18 PROCEDURE — P9016: CPT

## 2018-12-18 RX ORDER — FERROUS SULFATE 325(65) MG
1 TABLET ORAL
Qty: 90 | Refills: 0 | OUTPATIENT
Start: 2018-12-18 | End: 2019-01-16

## 2018-12-18 RX ORDER — PANTOPRAZOLE SODIUM 20 MG/1
1 TABLET, DELAYED RELEASE ORAL
Qty: 0 | Refills: 0 | COMMUNITY
Start: 2018-12-18

## 2018-12-18 RX ORDER — ASPIRIN/CALCIUM CARB/MAGNESIUM 324 MG
1 TABLET ORAL
Qty: 30 | Refills: 0 | OUTPATIENT
Start: 2018-12-18 | End: 2019-01-16

## 2018-12-18 RX ORDER — HALOPERIDOL DECANOATE 100 MG/ML
0.5 INJECTION INTRAMUSCULAR ONCE
Qty: 0 | Refills: 0 | Status: COMPLETED | OUTPATIENT
Start: 2018-12-18 | End: 2018-12-18

## 2018-12-18 RX ORDER — QUETIAPINE FUMARATE 200 MG/1
1 TABLET, FILM COATED ORAL
Qty: 0 | Refills: 0 | COMMUNITY
Start: 2018-12-18

## 2018-12-18 RX ORDER — POTASSIUM CHLORIDE 20 MEQ
40 PACKET (EA) ORAL EVERY 4 HOURS
Qty: 0 | Refills: 0 | Status: DISCONTINUED | OUTPATIENT
Start: 2018-12-18 | End: 2018-12-18

## 2018-12-18 RX ADMIN — HALOPERIDOL DECANOATE 0.5 MILLIGRAM(S): 100 INJECTION INTRAMUSCULAR at 10:18

## 2018-12-18 RX ADMIN — Medication 1 DROP(S): at 11:31

## 2018-12-18 RX ADMIN — PANTOPRAZOLE SODIUM 40 MILLIGRAM(S): 20 TABLET, DELAYED RELEASE ORAL at 05:00

## 2018-12-18 RX ADMIN — TRAMADOL HYDROCHLORIDE 50 MILLIGRAM(S): 50 TABLET ORAL at 07:00

## 2018-12-18 RX ADMIN — IRON SUCROSE 110 MILLIGRAM(S): 20 INJECTION, SOLUTION INTRAVENOUS at 08:17

## 2018-12-18 RX ADMIN — BRIMONIDINE TARTRATE 1 DROP(S): 2 SOLUTION/ DROPS OPHTHALMIC at 11:31

## 2018-12-18 RX ADMIN — Medication 40 MILLIEQUIVALENT(S): at 15:08

## 2018-12-18 RX ADMIN — TRAMADOL HYDROCHLORIDE 50 MILLIGRAM(S): 50 TABLET ORAL at 06:42

## 2018-12-18 RX ADMIN — TIOTROPIUM BROMIDE 1 CAPSULE(S): 18 CAPSULE ORAL; RESPIRATORY (INHALATION) at 09:05

## 2018-12-18 NOTE — CONSULT NOTE ADULT - ASSESSMENT
In short, this is a 73-year-old woman with COPD, atrial fibrillation, peripheral vascular disease, and glaucoma who presented to the ED with symptomatic iron deficiency anemia.  Etiology of her KATE is unknown, and GI has been consulted to explore any possible GI losses.  Per chart review, the patient was previously a hospice patient, for reasons that are not clear to me.  She is quite debilitated and very chronically ill appearing.  She was not at all interested in undergoing an EGD or colonoscopy, despite my explanation of why those procedures are done in these situations.  She recommended that I speak with her daughter.  Though I personally put little stock into standard fecal occult blood testing, it is worth mentioning that her stool was negative, and on KAREN, she has light brown stool.  It is possible her KATE is secondary to GI losses, so I have ordered a fecal occult blood immunology test given her reluctance to undergo endoscopic evaluation.  I suspect her KATE may be secondary to chronic osteomyelitis and not from an occult GI malignancy.    Recommendations:  - Check FIT  - Discuss endoscopic workup with the patient's daughter  - Continue hematology workup    Thank you for the consult.  Please do not hesitate to call with any questions or concerns.    ELAINA Lawson MD  [facility] Physician [facility]  Division of Gastroenterology  Tel [phone]  Fax [phone]  12-18-18 @ 07:46

## 2018-12-18 NOTE — DISCHARGE NOTE ADULT - CARE PROVIDER_API CALL
Anil Eden), Internal Medicine  [address] [facility] [address]  [place] [address]  Phone: [phone]  Fax: [phone]    XIOMARA Lawson), Internal Medicine  [address] [place] [address]  Phone: [phone]  Fax: [phone]

## 2018-12-18 NOTE — DISCHARGE NOTE ADULT - CARE PLAN
Principal Discharge DX:	Iron deficiency anemia, unspecified iron deficiency anemia type  Goal:	improved, s/p prbc  Assessment and plan of treatment:	paTIENT H/H remain stable, refused EGD  needs to follow up with hematology and gi as an outpatient  Secondary Diagnosis:	COPD exacerbation  Secondary Diagnosis:	Leukocytosis, unspecified type  Assessment and plan of treatment:	likely reactive  f/u with hematology as an outpatient  Secondary Diagnosis:	Thrombocytosis  Assessment and plan of treatment:	f/u hematology as an outpatient

## 2018-12-18 NOTE — DISCHARGE NOTE ADULT - PLAN OF CARE
improved, s/p prbc paTIENT H/H remain stable, refused EGD  needs to follow up with hematology and gi as an outpatient likely reactive  f/u with hematology as an outpatient f/u hematology as an outpatient

## 2018-12-18 NOTE — DISCHARGE NOTE ADULT - HOSPITAL COURSE
73y Female PMH Chronic Afib not on ac due to risk of falls, COPD, PVD, Glaucoma, HLD, presents c/o gradually worsening SOB and fatigue x several weeks.  Symptoms began gradually, progressive, worse with exertion.  Denies wheezing, states her current symptoms are not like prior COPD exacerbations.  Noted Hgb 5.3 down from 11.8 in September 2018.  Denies bleeding nor changes in stool consistency.  Daughter at bedside states pt had been taken off anticoagulation for Afib due to frequent falls.  No additional complaints. sen by hematology and GI. recieved prbc found to have ferritin of 8. iron sucrose was given. her h/h remain stable. Evaluated by GI and patient refused EGD. discussed with daughter Jemima and mentioned to follow up with gi and hematology as an outpatient. PT recommended [facility] placement.    1) Symptomatic Anemia  - Likely secondary to Iron Deficiency Anemia  - s/p PRBCs transfusion  - Getting IV Venofrer  - Hem and GI consulte noted    2) COPD  - continue Spiriva and Nebulizers prn    3) Chronic Afib    - Start Diltiazem  - Not on AC due to fall risk     4) Leukocytosis and Thrombocytosis  - Likely reactive    PVD;   f.u with pcp as an outpatient        Dispo: [facility]      PHYSICAL EXAM:  Vital Signs   T(C): 37.1 (17 Dec 2018 00:45), Max: 37.1 (17 Dec 2018 00:45)  T(F): 98.8 (17 Dec 2018 00:45), Max: 98.8 (17 Dec 2018 00:45)  HR: 80 (17 Dec 2018 00:45) (80 - 101)  BP: 114/76 (17 Dec 2018 00:45) (114/76 - 161/90)  RR: 18 (17 Dec 2018 00:45) (18 - 18)  SpO2: 98% (17 Dec 2018 00:45) (94% - 98%)    General: Elderly female lying in bed comfortably. No acute distress  HEENT: PERRLA. EOMI. Clear conjunctivae. Moist mucus membrane  Neck: Supple. No JVD.   Chest: CTA bilaterally - no wheezing, rales or rhonchi.   Heart: S1 & S2 with irregular rhythm.    Abdomen: Soft. Non-tender. Non-distended. + BS  Ext: No pedal edema. No calf tenderness   Neuro: Active and alert. No focal deficit. No speech disorder  Skin: Warm and Dry  Psychiatry: Normal mood and affect    I spent 45 min in discharging the patient 73y Female PMH Chronic Afib not on ac due to risk of falls, COPD, PVD, Glaucoma, HLD, presents c/o gradually worsening SOB and fatigue x several weeks.  Symptoms began gradually, progressive, worse with exertion.  Denies wheezing, states her current symptoms are not like prior COPD exacerbations.  Noted Hgb 5.3 down from 11.8 in September 2018.  Denies bleeding nor changes in stool consistency.  Daughter at bedside states pt had been taken off anticoagulation for Afib due to frequent falls.  No additional complaints. sen by hematology and GI. recieved prbc found to have ferritin of 8. iron sucrose was given. her h/h remain stable. Evaluated by GI and patient refused EGD. discussed with daughter Jemima and mentioned to follow up with gi and hematology as an outpatient. PT recommended [facility] placement.    1) Symptomatic Anemia  - Likely secondary to Iron Deficiency Anemia  - s/p PRBCs transfusion  - Getting IV Venofrer  - Hem and GI consulte noted    2) COPD  - continue Spiriva and Nebulizers prn    3) Chronic Afib    - Start Diltiazem  - Not on AC due to fall risk     4) Leukocytosis and Thrombocytosis  - Likely reactive    PVD;   f.u with pcp as an outpatient  no recent stenting /vascualr intervention  hold antiplatelets for now, pcp follow up as an outpatient        Dispo: [facility]      PHYSICAL EXAM:  Vital Signs   T(C): 37.1 (17 Dec 2018 00:45), Max: 37.1 (17 Dec 2018 00:45)  T(F): 98.8 (17 Dec 2018 00:45), Max: 98.8 (17 Dec 2018 00:45)  HR: 80 (17 Dec 2018 00:45) (80 - 101)  BP: 114/76 (17 Dec 2018 00:45) (114/76 - 161/90)  RR: 18 (17 Dec 2018 00:45) (18 - 18)  SpO2: 98% (17 Dec 2018 00:45) (94% - 98%)    General: Elderly female lying in bed comfortably. No acute distress  HEENT: PERRLA. EOMI. Clear conjunctivae. Moist mucus membrane  Neck: Supple. No JVD.   Chest: CTA bilaterally - no wheezing, rales or rhonchi.   Heart: S1 & S2 with irregular rhythm.    Abdomen: Soft. Non-tender. Non-distended. + BS  Ext: No pedal edema. No calf tenderness   Neuro: Active and alert. No focal deficit. No speech disorder  Skin: Warm and Dry  Psychiatry: Normal mood and affect    I spent 45 min in discharging the patient 73y Female PMH Chronic Afib not on ac due to risk of falls, COPD, PVD, Glaucoma, HLD, presents c/o gradually worsening SOB and fatigue x several weeks.  Symptoms began gradually, progressive, worse with exertion.  Denies wheezing, states her current symptoms are not like prior COPD exacerbations.  Noted Hgb 5.3 down from 11.8 in September 2018.  Denies bleeding nor changes in stool consistency.  Daughter at bedside states pt had been taken off anticoagulation for Afib due to frequent falls.  No additional complaints. sen by hematology and GI. recieved prbc found to have ferritin of 8. iron sucrose was given. her h/h remain stable. Evaluated by GI and patient refused EGD. discussed with daughter Jemima and mentioned to follow up with gi and hematology as an outpatient. PT recommended [facility] placement.    1) Symptomatic Anemia  - Likely secondary to Iron Deficiency Anemia  - s/p PRBCs transfusion  - Getting IV Venofrer  - Hem and GI consulte noted    2) COPD  - continue Spiriva and Nebulizers prn    3) Chronic Afib    - Start Diltiazem  - Not on AC due to fall risk     4) Leukocytosis and Thrombocytosis  - Likely reactive    PVD;   f.u with pcp as an outpatient  no recent stenting /vascualr intervention  can  continue taking aspirin.  f/u with pcp, hematology and gi before starting plavix.          Dispo: [facility]      PHYSICAL EXAM:  Vital Signs   T(C): 37.1 (17 Dec 2018 00:45), Max: 37.1 (17 Dec 2018 00:45)  T(F): 98.8 (17 Dec 2018 00:45), Max: 98.8 (17 Dec 2018 00:45)  HR: 80 (17 Dec 2018 00:45) (80 - 101)  BP: 114/76 (17 Dec 2018 00:45) (114/76 - 161/90)  RR: 18 (17 Dec 2018 00:45) (18 - 18)  SpO2: 98% (17 Dec 2018 00:45) (94% - 98%)    General: Elderly female lying in bed comfortably. No acute distress  HEENT: PERRLA. EOMI. Clear conjunctivae. Moist mucus membrane  Neck: Supple. No JVD.   Chest: CTA bilaterally - no wheezing, rales or rhonchi.   Heart: S1 & S2 with irregular rhythm.    Abdomen: Soft. Non-tender. Non-distended. + BS  Ext: No pedal edema. No calf tenderness   Neuro: Active and alert. No focal deficit. No speech disorder  Skin: Warm and Dry  Psychiatry: Normal mood and affect    I spent 45 min in discharging the patient

## 2018-12-18 NOTE — DISCHARGE NOTE ADULT - PATIENT PORTAL LINK FT
You can access the [facility] Patient Portal, offered by [facility], by registering with the following website: [email or web address]

## 2018-12-18 NOTE — DISCHARGE NOTE ADULT - MEDICATION SUMMARY - MEDICATIONS TO TAKE
I will START or STAY ON the medications listed below when I get home from the hospital:    dilTIAZem 120 mg oral tablet  -- 1 tab(s) by mouth once a day  -- Indication: For A fib    traZODone 50 mg oral tablet  -- 1 tab(s) by mouth once a day (at bedtime)  -- Indication: For depression    atorvastatin 10 mg oral tablet  -- 1 tab(s) by mouth once a day (at bedtime)  -- Indication: For Hyperlipidemia    QUEtiapine 25 mg oral tablet  -- 1 tab(s) by mouth once a day (at bedtime)  -- Indication: For Agitation    alendronate 70 mg oral tablet  -- 1 tab(s) by mouth once a week  -- Indication: For bone resportion agent    albuterol 90 mcg/inh inhalation aerosol  -- 1 puff(s) inhaled every 4 hours  -- Indication: For COPD exacerbation    tiotropium 18 mcg inhalation capsule  -- 1 cap(s) inhaled once a day  -- Indication: For COPD exacerbation    ferrous sulfate 325 mg (65 mg elemental iron) oral delayed release tablet  -- 1 tab(s) by mouth 3 times a day x 30 days   -- May discolor urine or feces.  Swallow whole.  Do not crush.    -- Indication: For Anemia    brimonidine 0.2% ophthalmic solution  -- 1 drop(s) to each affected eye once a day  -- Indication: For ophtlamic    latanoprost 0.005% ophthalmic solution  -- 1 drop(s) to each affected eye once a day (at bedtime)  -- Indication: For ophthalmic    timolol maleate 0.5% ophthalmic solution  -- 1 drop(s) to each affected eye once a day  -- Indication: For ophthalmic    pantoprazole 40 mg oral delayed release tablet  -- 1 tab(s) by mouth once a day (before a meal)  -- Indication: For Anemia I will START or STAY ON the medications listed below when I get home from the hospital:    aspirin 81 mg oral delayed release tablet  -- 1 tab(s) by mouth once a day  -- Indication: For Pvd    dilTIAZem 120 mg oral tablet  -- 1 tab(s) by mouth once a day  -- Indication: For A fib    traZODone 50 mg oral tablet  -- 1 tab(s) by mouth once a day (at bedtime)  -- Indication: For depression    atorvastatin 10 mg oral tablet  -- 1 tab(s) by mouth once a day (at bedtime)  -- Indication: For Hyperlipidemia    QUEtiapine 25 mg oral tablet  -- 1 tab(s) by mouth once a day (at bedtime)  -- Indication: For Agitation    alendronate 70 mg oral tablet  -- 1 tab(s) by mouth once a week  -- Indication: For bone resportion agent    albuterol 90 mcg/inh inhalation aerosol  -- 1 puff(s) inhaled every 4 hours  -- Indication: For COPD exacerbation    tiotropium 18 mcg inhalation capsule  -- 1 cap(s) inhaled once a day  -- Indication: For COPD exacerbation    ferrous sulfate 325 mg (65 mg elemental iron) oral delayed release tablet  -- 1 tab(s) by mouth 3 times a day x 30 days   -- May discolor urine or feces.  Swallow whole.  Do not crush.    -- Indication: For Anemia    brimonidine 0.2% ophthalmic solution  -- 1 drop(s) to each affected eye once a day  -- Indication: For ophtlamic    latanoprost 0.005% ophthalmic solution  -- 1 drop(s) to each affected eye once a day (at bedtime)  -- Indication: For ophthalmic    timolol maleate 0.5% ophthalmic solution  -- 1 drop(s) to each affected eye once a day  -- Indication: For ophthalmic    pantoprazole 40 mg oral delayed release tablet  -- 1 tab(s) by mouth once a day (before a meal)  -- Indication: For Anemia

## 2018-12-18 NOTE — DISCHARGE NOTE ADULT - MEDICATION SUMMARY - MEDICATIONS TO STOP TAKING
I will STOP taking the medications listed below when I get home from the hospital:    aspirin 81 mg oral delayed release tablet  -- 1 tab(s) by mouth once a day    clopidogrel 75 mg oral tablet  -- 1 tab(s) by mouth once a day    predniSONE 5 mg oral tablet  -- 4 tab(s) by mouth once a day for 3 days  3 tabs a day for 3 days then  2 tabs a day for 3 days then  1 tab a day for 3 days

## 2018-12-18 NOTE — CONSULT NOTE ADULT - SUBJECTIVE AND OBJECTIVE BOX
HISTORY OF PRESENT ILLNESS: This is a 73-year-old woman with a past medical history significant for COPD, PVD, atrial fibrillation not on anticoagulation due to falls who presented to the ED with complaints of gradually worsening SOB and fatigue over the past several weeks.  Symptoms began gradually, progressive, and are worse with exertion.  On presentation, her hemoglobin was 5.3 g/dL (down from 11.8 at the end of September) with an MCV of 60.6.  Her iron studies showed a profound iron deficiency.  Her stool was negative of occult blood.  She has never undergone an EGD or colonoscopy and does not want to have either at "this stage".  She recommended that I speak with her daughter.  She denied any history of melena or hematochezia.  She denied any abdominal pain, nausea or vomiting.  She denied any family history of colorectal cancer.    REVIEW OF SYSTEMS:  Constitutional:  No unintentional weight loss, changes in appetite, fevers, chills or night sweats	  Eyes: No eye pain, redness, discharge, or proptosis  ENMT: No sore throat, ear pain, mouth sores, or swollen glands in the neck  Respiratory: No dyspnea, cough or wheezing  Cardiovascular: No chest pain, dyspnea on exertion, or orthopnea  Gastrointestinal:	Please see HPI  Genitourinary: No dysuria or hematuria  Neurological:	 No changes in sleep/wake cycle, convulsions, confusion, dizziness or lightheadedness  Psychiatric: No changes in personality or emotional problems   Hematology: No easy bruising   Endocrine: No hot or cold flashes or deepening of voice	  All other review of systems were completed and were otherwise negative save what is reported in the HPI.    PAST MEDICAL/SURGICAL HISTORY:  Glaucoma  PVD (peripheral vascular disease)  COPD (chronic obstructive pulmonary disease)  Chronic atrial fibrillation  No significant past surgical history    SOCIAL HISTORY:  - TOBACCO: Former smoker  - ALCOHOL: Denies  - ILLICIT DRUG USE: Denies    FAMILY HISTORY:  No known history of gastrointestinal or liver disease;  No pertinent family history in first degree relatives    HOME MEDICATIONS:    INPATIENT MEDICATIONS:  MEDICATIONS  (STANDING):  brimonidine 0.2% Ophthalmic Solution 1 Drop(s) Both EYES daily  diltiazem    Tablet 30 milliGRAM(s) Oral three times a day  iron sucrose IVPB 200 milliGRAM(s) IV Intermittent every 24 hours  latanoprost 0.005% Ophthalmic Solution 1 Drop(s) Both EYES at bedtime  pantoprazole    Tablet 40 milliGRAM(s) Oral before breakfast  QUEtiapine 25 milliGRAM(s) Oral at bedtime  timolol 0.5% Solution 1 Drop(s) Both EYES daily  tiotropium 18 MICROgram(s) Capsule 1 Capsule(s) Inhalation daily    MEDICATIONS  (PRN):  ALBUTerol/ipratropium for Nebulization 3 milliLiter(s) Nebulizer every 4 hours PRN Shortness of Breath and/or Wheezing  traMADol 50 milliGRAM(s) Oral every 6 hours PRN Moderate Pain (4 - 6)    ALLERGIES:  Pen-V (Anaphylaxis)    VITAL SIGNS LAST 24 HOURS:  T(C): 37.1 (17 Dec 2018 23:10), Max: 37.1 (17 Dec 2018 23:10)  T(F): 98.7 (17 Dec 2018 23:10), Max: 98.7 (17 Dec 2018 23:10)  HR: 72 (18 Dec 2018 04:44) (69 - 75)  BP: 106/62 (18 Dec 2018 04:44) (106/62 - 115/74)  RR: 18 (18 Dec 2018 04:44) (17 - 19)  SpO2: 95% (17 Dec 2018 23:10) (95% - 95%)    PHYSICAL EXAM:  Constitutional: Malnourished elderly  woman in no apparent distress; significantly older-appearing than reported age  Eyes: Sclerae anicteric, conjunctivae normal  ENMT: Mucus membranes moist, no oropharyngeal thrush noted  Neck: No thyroid nodules appreciated, no significant cervical or supraclavicular lymphadenopathy  Respiratory: Breathing nonlabored; poor inspiratory effort, poor air movement at bases, no rales, rhonchi, or wheezing  Cardiovascular: Regular rate and rhythm, no m/r/g  Gastrointestinal: Soft, nontender, nondistended, normoactive bowel sounds; no hepatosplenomegaly appreciated; no rebound tenderness or involuntary guarding  Rectal: Perianal exam within normal limits; normal sphincter tone; light brown stool on glove  Extremities: No clubbing, cyanosis or edema  Neurological: Alert and oriented to person, place and time; no asterixis  Skin: No jaundice  Lymph Nodes: No significant lymphadenopathy  Musculoskeletal: Diffuse, significant peripheral atrophy  Psychiatric: Affect and mood appropriate      LABS:                        11.1   12.6  )-----------( 678      ( 16 Dec 2018 12:13 )             35.7       12-16    139  |  103  |  16.0  ----------------------------<  100  4.1   |  21.0<L>  |  0.52    Ca    9.9      16 Dec 2018 12:13  Phos  3.5     12-16  Mg     1.9     12-16    IMAGING: I personally reviewed the CXR, and I agree with the radiologist's interpretation.

## 2018-12-19 ENCOUNTER — MOBILE ON CALL (OUTPATIENT)
Age: 73
End: 2018-12-19

## 2018-12-19 RX ORDER — SACCHAROMYCES BOULARDII 250 MG
250 CAPSULE ORAL TWICE DAILY
Refills: 0 | Status: DISCONTINUED | COMMUNITY
End: 2018-12-19

## 2018-12-19 RX ORDER — QUETIAPINE FUMARATE 25 MG/1
25 TABLET ORAL AT BEDTIME
Refills: 0 | Status: ACTIVE | COMMUNITY

## 2018-12-19 RX ORDER — FAMOTIDINE 20 MG/1
20 TABLET, FILM COATED ORAL TWICE DAILY
Refills: 0 | Status: DISCONTINUED | COMMUNITY
End: 2018-12-19

## 2018-12-19 RX ORDER — TRAMADOL HYDROCHLORIDE 50 MG/1
50 TABLET, COATED ORAL
Refills: 0 | Status: DISCONTINUED | COMMUNITY
End: 2018-12-19

## 2018-12-19 RX ORDER — CIPROFLOXACIN HYDROCHLORIDE 500 MG/1
500 TABLET, FILM COATED ORAL DAILY
Qty: 28 | Refills: 0 | Status: DISCONTINUED | COMMUNITY
Start: 2018-10-10 | End: 2018-12-19

## 2018-12-19 RX ORDER — HYDRALAZINE HYDROCHLORIDE 25 MG/1
25 TABLET ORAL TWICE DAILY
Refills: 0 | Status: DISCONTINUED | COMMUNITY
End: 2018-12-19

## 2018-12-19 RX ORDER — CLOPIDOGREL BISULFATE 75 MG/1
75 TABLET, FILM COATED ORAL DAILY
Refills: 0 | Status: DISCONTINUED | COMMUNITY
End: 2018-12-19

## 2019-01-09 ENCOUNTER — APPOINTMENT (OUTPATIENT)
Dept: PULMONOLOGY | Facility: CLINIC | Age: 74
End: 2019-01-09

## 2019-01-13 ENCOUNTER — EMERGENCY (EMERGENCY)
Facility: HOSPITAL | Age: 74
LOS: 1 days | Discharge: HOME | End: 2019-01-13
Attending: EMERGENCY MEDICINE
Payer: MEDICARE

## 2019-01-13 VITALS
HEART RATE: 77 BPM | SYSTOLIC BLOOD PRESSURE: 142 MMHG | TEMPERATURE: 98 F | OXYGEN SATURATION: 95 % | RESPIRATION RATE: 18 BRPM | DIASTOLIC BLOOD PRESSURE: 85 MMHG

## 2019-01-13 VITALS
RESPIRATION RATE: 18 BRPM | DIASTOLIC BLOOD PRESSURE: 90 MMHG | SYSTOLIC BLOOD PRESSURE: 157 MMHG | OXYGEN SATURATION: 95 % | HEART RATE: 81 BPM | TEMPERATURE: 98 F

## 2019-01-13 PROCEDURE — 72125 CT NECK SPINE W/O DYE: CPT

## 2019-01-13 PROCEDURE — 99284 EMERGENCY DEPT VISIT MOD MDM: CPT | Mod: 25

## 2019-01-13 PROCEDURE — 99284 EMERGENCY DEPT VISIT MOD MDM: CPT

## 2019-01-13 PROCEDURE — 70486 CT MAXILLOFACIAL W/O DYE: CPT

## 2019-01-13 PROCEDURE — 72125 CT NECK SPINE W/O DYE: CPT | Mod: 26

## 2019-01-13 PROCEDURE — 70486 CT MAXILLOFACIAL W/O DYE: CPT | Mod: 26

## 2019-01-13 PROCEDURE — 70450 CT HEAD/BRAIN W/O DYE: CPT | Mod: 26

## 2019-01-13 PROCEDURE — 70450 CT HEAD/BRAIN W/O DYE: CPT

## 2019-01-13 PROCEDURE — 94640 AIRWAY INHALATION TREATMENT: CPT

## 2019-01-13 RX ORDER — IPRATROPIUM/ALBUTEROL SULFATE 18-103MCG
3 AEROSOL WITH ADAPTER (GRAM) INHALATION ONCE
Qty: 0 | Refills: 0 | Status: COMPLETED | OUTPATIENT
Start: 2019-01-13 | End: 2019-01-13

## 2019-01-13 RX ADMIN — Medication 3 MILLILITER(S): at 04:20

## 2019-01-13 NOTE — ED PROVIDER NOTE - OBJECTIVE STATEMENT
72 y/o F with hx of COPD, A-fib BIBA to the ED s/p fall today. Pt accompanied by daughter who states pt fell out of bed while asleep. Pt is currently admitted at [facility], roommate witnessed pt's fall. +head trauma. Currently taking Trazadone, ASA. Pt is on breathing treatment, Albuterol.

## 2019-01-13 NOTE — ED ADULT TRIAGE NOTE - CHIEF COMPLAINT QUOTE
pt a+ox3, BIBA s/p fall at nursing home. pt reports falling from bed and hitting head, denies LOC. daily asa. hematoma to forehead noted, lac to left lower lip. bleeding controlled. MD Saha called to bedside to eval.

## 2019-01-13 NOTE — ED PROVIDER NOTE - PHYSICAL EXAMINATION
sitting comfortably, talking in full sentences  pupilsa reactive, eomi,   head: frontal hematoma with obvious swelling  mm moist, no oral injury, no facial pain  supple, no c/t/l spine tenderness, from, trachea in midline  Terence chest expansion, no cheat wall tenderness, no rib tenderness, no deformity, no clavicular pain  S1 S2 distant  abd soft, non tender, no g/r,   no pelvic pain  moves all ext, no swelling noted  Neuro aao3, cn intact grossly, no focal deficits  skin no bruises, no swelling sitting comfortably, talking in full sentences  pupilsa reactive, eomi,   head: frontal hematoma with obvious swelling  mm moist, L side mandible area swelling with small hematoma, tender on palpation  supple, no c/t/l spine tenderness, from, trachea in midline  Terence chest expansion, no cheat wall tenderness, no rib tenderness, no deformity, no clavicular pain  S1 S2 distant  abd soft, non tender, no g/r,   no pelvic pain  moves all ext, no swelling noted  Neuro aao3, cn intact grossly, no focal deficits  skin no bruises, no swelling

## 2019-01-13 NOTE — ED CLERICAL - NS ED CLERK NOTE PRE-ARRIVAL INFORMATION; ADDITIONAL PRE-ARRIVAL INFORMATION
This patient is enrolled in the [facility] readmission reduction program and has active care navigation. This patient can be followed up by the care navigation team within 24 hours. To arrange close follow-up or to obtain additional clinical information about this patient, please call the contact number above. Please speak with the [place] ED Case Manager for assistance with discharge planning

## 2019-01-13 NOTE — ED PROVIDER NOTE - CARE PLAN
Principal Discharge DX:	Fall  Secondary Diagnosis:	Facial contusion  Secondary Diagnosis:	Closed head injury

## 2019-01-24 ENCOUNTER — APPOINTMENT (OUTPATIENT)
Dept: PULMONOLOGY | Facility: CLINIC | Age: 74
End: 2019-01-24
Payer: MEDICARE

## 2019-01-24 VITALS
OXYGEN SATURATION: 96 % | WEIGHT: 122 LBS | DIASTOLIC BLOOD PRESSURE: 70 MMHG | SYSTOLIC BLOOD PRESSURE: 112 MMHG | BODY MASS INDEX: 23.05 KG/M2 | HEART RATE: 98 BPM

## 2019-01-24 DIAGNOSIS — J44.1 CHRONIC OBSTRUCTIVE PULMONARY DISEASE WITH (ACUTE) EXACERBATION: ICD-10-CM

## 2019-01-24 PROCEDURE — 99214 OFFICE O/P EST MOD 30 MIN: CPT

## 2019-01-24 RX ORDER — BUDESONIDE 0.5 MG/2ML
0.5 INHALANT ORAL TWICE DAILY
Qty: 90 | Refills: 1 | Status: DISCONTINUED | COMMUNITY
Start: 2018-10-05 | End: 2019-01-24

## 2019-01-24 RX ORDER — AZITHROMYCIN 250 MG/1
250 TABLET, FILM COATED ORAL
Qty: 36 | Refills: 1 | Status: ACTIVE | COMMUNITY
Start: 2019-01-24 | End: 1900-01-01

## 2019-01-24 RX ORDER — HYDRALAZINE HYDROCHLORIDE 25 MG/1
25 TABLET ORAL
Refills: 0 | Status: ACTIVE | COMMUNITY

## 2019-01-24 RX ORDER — CHLORHEXIDINE GLUCONATE 4 %
325 (65 FE) LIQUID (ML) TOPICAL 3 TIMES DAILY
Refills: 0 | Status: DISCONTINUED | COMMUNITY
End: 2019-01-24

## 2019-01-24 RX ORDER — BUDESONIDE 0.5 MG/2ML
0.5 INHALANT ORAL DAILY
Qty: 360 | Refills: 3 | Status: ACTIVE | COMMUNITY
Start: 2019-01-24 | End: 1900-01-01

## 2019-01-24 RX ORDER — ALENDRONATE SODIUM 70 MG/1
70 TABLET ORAL
Refills: 0 | Status: DISCONTINUED | COMMUNITY
End: 2019-01-24

## 2019-01-24 RX ORDER — PANTOPRAZOLE SODIUM 40 MG/1
40 TABLET, DELAYED RELEASE ORAL DAILY
Refills: 0 | Status: DISCONTINUED | COMMUNITY
End: 2019-01-24

## 2019-01-24 RX ORDER — CLOPIDOGREL 75 MG/1
75 TABLET, FILM COATED ORAL
Refills: 0 | Status: ACTIVE | COMMUNITY

## 2019-01-24 RX ORDER — ALBUTEROL SULFATE 2.5 MG/3ML
(2.5 MG/3ML) SOLUTION RESPIRATORY (INHALATION)
Refills: 0 | Status: ACTIVE | COMMUNITY

## 2019-01-24 NOTE — PHYSICAL EXAM
[General Appearance - Well Developed] : well developed [General Appearance - Well Nourished] : well nourished [Normal Conjunctiva] : the conjunctiva exhibited no abnormalities [Normal Oropharynx] : normal oropharynx [Neck Appearance] : the appearance of the neck was normal [Neck Cervical Mass (___cm)] : no neck mass was observed [Jugular Venous Distention Increased] : there was no jugular-venous distention [Thyroid Diffuse Enlargement] : the thyroid was not enlarged [Edema] : no peripheral edema present [Respiration, Rhythm And Depth] : normal respiratory rhythm and effort [Auscultation Breath Sounds / Voice Sounds] : lungs were clear to auscultation bilaterally [Lungs Percussion] : the lungs were normal to percussion [Bowel Sounds] : normal bowel sounds [Abdomen Soft] : soft [Abdomen Tenderness] : non-tender [Abnormal Walk] : normal gait [Nail Clubbing] : no clubbing of the fingernails [Cyanosis, Localized] : no localized cyanosis [Skin Turgor] : normal skin turgor [] : no rash [No Focal Deficits] : no focal deficits [Affect] : the affect was normal [FreeTextEntry1] : Irreg - irreg

## 2019-01-24 NOTE — HISTORY OF PRESENT ILLNESS
[FreeTextEntry1] : 72 yo female former 90 pack year smoker seen post hospitalization for exacerbation of COPD\par Date(s) of hospitalization: The patient was admitted on 09/27/18 and discharged on 09/29/18. \par The patient was called and contacted by Qing Evangelista NP within 48 hours of hospital discharge and I have reviewed the discharge summary, discharge medication list and medication reconciliation has been completed. \par Leaving home requires considerable and taxing effort. Pt requires assistive device and support from another person to leave home. \par Brief Hospital Course: Patient admitted to [facility] from 9/27/18- 9/29/18 and treated for COPD exacerbation. Pt given IV solumedrol, duoneb nebulizer treatments and IV levaquin during hospitalization for COPD. Pt discharged home and referred to [facility] for FU. \par \par 1/24/19\par Episodic worse dyspnea\par Has required steroids\par Recently in [facility]\par

## 2019-01-24 NOTE — CONSULT LETTER
[Dear  ___] : Dear  [unfilled], [Consult Letter:] : I had the pleasure of evaluating your patient, [unfilled]. [Please see my note below.] : Please see my note below. [Consult Closing:] : Thank you very much for allowing me to participate in the care of this patient.  If you have any questions, please do not hesitate to contact me. [Sincerely,] : Sincerely, [DrAnnamarie  ___] : Dr. MACHADO

## 2019-02-26 ENCOUNTER — APPOINTMENT (OUTPATIENT)
Dept: PULMONOLOGY | Facility: CLINIC | Age: 74
End: 2019-02-26
Payer: MEDICARE

## 2019-02-26 VITALS
WEIGHT: 118 LBS | BODY MASS INDEX: 22.3 KG/M2 | SYSTOLIC BLOOD PRESSURE: 100 MMHG | HEART RATE: 91 BPM | OXYGEN SATURATION: 98 % | DIASTOLIC BLOOD PRESSURE: 60 MMHG

## 2019-02-26 DIAGNOSIS — F03.90 UNSPECIFIED DEMENTIA W/OUT BEHAVIORAL DISTURBANCE: ICD-10-CM

## 2019-02-26 DIAGNOSIS — R06.09 OTHER FORMS OF DYSPNEA: ICD-10-CM

## 2019-02-26 DIAGNOSIS — J44.9 CHRONIC OBSTRUCTIVE PULMONARY DISEASE, UNSPECIFIED: ICD-10-CM

## 2019-02-26 PROCEDURE — 99214 OFFICE O/P EST MOD 30 MIN: CPT

## 2019-02-26 RX ORDER — PREDNISONE 10 MG/1
10 TABLET ORAL
Qty: 30 | Refills: 1 | Status: DISCONTINUED | COMMUNITY
Start: 2019-01-24 | End: 2019-02-26

## 2019-02-26 RX ORDER — DONEPEZIL HYDROCHLORIDE 10 MG/1
10 TABLET ORAL
Qty: 30 | Refills: 5 | Status: DISCONTINUED | COMMUNITY
Start: 2018-12-07 | End: 2019-02-26

## 2019-02-26 RX ORDER — TIOTROPIUM BROMIDE 18 UG/1
18 CAPSULE ORAL; RESPIRATORY (INHALATION) DAILY
Refills: 0 | Status: DISCONTINUED | COMMUNITY
End: 2019-02-26

## 2019-02-26 RX ORDER — PREDNISONE 10 MG/1
10 TABLET ORAL
Qty: 30 | Refills: 1 | Status: ACTIVE | COMMUNITY
Start: 2019-02-26 | End: 1900-01-01

## 2019-02-26 RX ORDER — IPRATROPIUM BROMIDE AND ALBUTEROL SULFATE 2.5; .5 MG/3ML; MG/3ML
0.5-2.5 (3) SOLUTION RESPIRATORY (INHALATION) 4 TIMES DAILY
Qty: 360 | Refills: 3 | Status: DISCONTINUED | COMMUNITY
Start: 2019-01-24 | End: 2019-02-26

## 2019-02-26 NOTE — ASSESSMENT
[FreeTextEntry1] : At least Moderate COPD - exacerbations Continue much less frequently\par Dementia

## 2019-02-26 NOTE — HISTORY OF PRESENT ILLNESS
[FreeTextEntry1] : 74 yo female former 90 pack year smoker seen post hospitalization for exacerbation of COPD\par Date(s) of hospitalization: The patient was admitted on 09/27/18 and discharged on 09/29/18. \par The patient was called and contacted by Qing Evangelista NP within 48 hours of hospital discharge and I have reviewed the discharge summary, discharge medication list and medication reconciliation has been completed. \par Leaving home requires considerable and taxing effort. Pt requires assistive device and support from another person to leave home. \par Brief Hospital Course: Patient admitted to [facility] from 9/27/18- 9/29/18 and treated for COPD exacerbation. Pt given IV solumedrol, duoneb nebulizer treatments and IV levaquin during hospitalization for COPD. Pt discharged home and referred to [facility] for FU. \par \par 1/24/19\par Episodic worse dyspnea\par Has required steroids\par Recently in [facility]\par \par 2/26/19\par One course of prednisone recently\par Moving to [facility]\par Will make records available\par

## 2019-04-09 ENCOUNTER — APPOINTMENT (OUTPATIENT)
Dept: NEUROLOGY | Facility: CLINIC | Age: 74
End: 2019-04-09

## 2019-06-25 NOTE — DISCHARGE NOTE ADULT - THE PATIENT HAS
difficulty decision making/difficulty remembering "I personally performed the services described in the documentation  recorded by the scribe in my presence, and it accurately and completely records my words and action.”

## 2023-12-18 NOTE — DISCHARGE NOTE ADULT - CASE MANAGER'S NAME
Writer called and advised patient's spouse, Hammad, of the below recommendations. Hammad verbalized understanding. Hammad [facility] to [facility] to [facility].    Shayy jalloh ccm

## 2025-02-21 NOTE — PHYSICAL THERAPY INITIAL EVALUATION ADULT - BALANCE DISTURBANCE, SYSTEM IMPAIRMENT CONTRIBUTE, REHAB EVAL
Deepak Stack (:  1962) is a 62 y.o. male, Established patient, here for evaluation of the following chief complaint(s):  Other (Bilat ear fullness was seen on  for a cold and now wants ears flushed. )      Vitals:    25 1450   BP: 128/66   Pulse: 74   Temp: 96.8 °F (36 °C)   SpO2: 98%       ASSESSMENT/PLAN:  1. Bilateral impacted cerumen  -     REMOVAL IMPACTED CERUMEN IRRIGATION/LVG UNILAT        No follow-ups on file.      SUBJECTIVE/OBJECTIVE:    Ear Fullness   There is pain in both ears. Chronicity: bilateral ear fullness for a few weeks.  seen  needed ears flush but unable to get done at time. The problem occurs constantly. The problem has been unchanged. The pain is at a severity of 3/10. The pain is mild. Pertinent negatives include no coughing, ear discharge, headaches, rhinorrhea or sore throat. He has tried nothing for the symptoms.         Review of Systems   Constitutional:  Negative for chills, fatigue and fever.   HENT:  Positive for ear pain (bilateral plugged). Negative for congestion, ear discharge, rhinorrhea and sore throat.    Eyes:  Negative for pain, discharge and redness.   Respiratory:  Negative for cough, chest tightness, shortness of breath and wheezing.    Cardiovascular:  Negative for chest pain and palpitations.   Neurological:  Negative for dizziness, light-headedness and headaches.         Physical Exam  Vitals reviewed.   Constitutional:       General: He is not in acute distress.  HENT:      Right Ear: There is impacted cerumen.      Left Ear: There is impacted cerumen.      Ears:      Comments: Bilateral cerumen impaction cleared using NS flush     Nose: Nose normal.      Mouth/Throat:      Lips: Pink.      Mouth: Mucous membranes are moist.   Pulmonary:      Effort: Pulmonary effort is normal. No respiratory distress.      Breath sounds: Normal air entry.   Skin:     General: Skin is warm and dry.   Neurological:      Mental Status: He is alert and  musculoskeletal